# Patient Record
Sex: MALE | Race: WHITE | NOT HISPANIC OR LATINO | Employment: OTHER | URBAN - METROPOLITAN AREA
[De-identification: names, ages, dates, MRNs, and addresses within clinical notes are randomized per-mention and may not be internally consistent; named-entity substitution may affect disease eponyms.]

---

## 2024-05-17 ENCOUNTER — NURSING HOME VISIT (OUTPATIENT)
Dept: POST ACUTE CARE | Facility: EXTERNAL LOCATION | Age: 79
End: 2024-05-17
Payer: MEDICARE

## 2024-05-17 DIAGNOSIS — N40.0 BENIGN PROSTATIC HYPERPLASIA, UNSPECIFIED WHETHER LOWER URINARY TRACT SYMPTOMS PRESENT: ICD-10-CM

## 2024-05-17 DIAGNOSIS — I48.91 ATRIAL FIBRILLATION, UNSPECIFIED TYPE (MULTI): Primary | ICD-10-CM

## 2024-05-17 DIAGNOSIS — R53.1 WEAKNESS: ICD-10-CM

## 2024-05-17 PROCEDURE — 99308 SBSQ NF CARE LOW MDM 20: CPT | Performed by: REGISTERED NURSE

## 2024-05-17 NOTE — LETTER
Patient: Cheko Jin  : 1945    Encounter Date: 2024    PROGRESS NOTE    Subjective  Chief complaint: Cheko Jin is a 78 y.o. male who is an acute skilled patient being seen and evaluated for weakness    HPI:  24 Patient admitted to SNF for therapy d/t weakness after recent hospitalization.   Patient requires assist with ADLs and transfers.  No new complaints.      Objective  Vital signs: 112/72, 97.3, 66, 18, 96%    Physical Exam  Constitutional:       General: He is not in acute distress.  Eyes:      Extraocular Movements: Extraocular movements intact.   Pulmonary:      Effort: Pulmonary effort is normal.   Musculoskeletal:      Cervical back: Neck supple.   Neurological:      Mental Status: He is alert.   Psychiatric:         Mood and Affect: Mood normal.         Behavior: Behavior is cooperative.         Assessment/Plan  Problem List Items Addressed This Visit       Atrial fibrillation (Multi) - Primary     Daily inr  Lovenox bridge to coumadin  Monitor for bruising and bleeding          Weakness     Therapy          BPH (benign prostatic hyperplasia)     Finasteride  Tamsulosin          Medications, treatments, and labs reviewed  Continue medications and treatments as listed in [facility]    Scribe Attestation  IKatie Scribe   attest that this documentation has been prepared under the direction and in the presence of KEVIN Mendoza.    Provider Attestation - Scribe documentation  All medical record entries made by the Scribe were at my direction and personally dictated by me. I have reviewed the chart and agree that the record accurately reflects my personal performance of the history, physical exam, discussion and plan.    KEVIN Mendoza        Electronically Signed By: KEVIN Mendoza   24  3:00 PM

## 2024-05-18 ENCOUNTER — LAB REQUISITION (OUTPATIENT)
Dept: LAB | Facility: HOSPITAL | Age: 79
End: 2024-05-18
Payer: MEDICARE

## 2024-05-18 DIAGNOSIS — Z79.01 LONG TERM (CURRENT) USE OF ANTICOAGULANTS: ICD-10-CM

## 2024-05-18 LAB
INR PPP: 1.3 (ref 0.9–1.1)
PROTHROMBIN TIME: 14.7 SECONDS (ref 9.8–12.8)

## 2024-05-18 PROCEDURE — 85610 PROTHROMBIN TIME: CPT

## 2024-05-19 ENCOUNTER — LAB REQUISITION (OUTPATIENT)
Dept: LAB | Facility: HOSPITAL | Age: 79
End: 2024-05-19
Payer: MEDICARE

## 2024-05-19 DIAGNOSIS — Z79.01 LONG TERM (CURRENT) USE OF ANTICOAGULANTS: ICD-10-CM

## 2024-05-19 LAB
INR PPP: 1.2 (ref 0.9–1.1)
PROTHROMBIN TIME: 13.9 SECONDS (ref 9.8–12.8)

## 2024-05-19 PROCEDURE — 85610 PROTHROMBIN TIME: CPT

## 2024-05-20 ENCOUNTER — NURSING HOME VISIT (OUTPATIENT)
Dept: POST ACUTE CARE | Facility: EXTERNAL LOCATION | Age: 79
End: 2024-05-20
Payer: MEDICARE

## 2024-05-20 DIAGNOSIS — N40.0 BENIGN PROSTATIC HYPERPLASIA, UNSPECIFIED WHETHER LOWER URINARY TRACT SYMPTOMS PRESENT: ICD-10-CM

## 2024-05-20 DIAGNOSIS — I48.91 ATRIAL FIBRILLATION, UNSPECIFIED TYPE (MULTI): Primary | ICD-10-CM

## 2024-05-20 DIAGNOSIS — I11.0 HYPERTENSIVE HEART DISEASE WITH HEART FAILURE (MULTI): ICD-10-CM

## 2024-05-20 DIAGNOSIS — E78.5 HYPERLIPIDEMIA, UNSPECIFIED HYPERLIPIDEMIA TYPE: ICD-10-CM

## 2024-05-20 DIAGNOSIS — M10.9 GOUT, UNSPECIFIED CAUSE, UNSPECIFIED CHRONICITY, UNSPECIFIED SITE: ICD-10-CM

## 2024-05-20 PROBLEM — R53.1 WEAKNESS: Status: ACTIVE | Noted: 2024-05-20

## 2024-05-20 PROCEDURE — 99305 1ST NF CARE MODERATE MDM 35: CPT | Performed by: INTERNAL MEDICINE

## 2024-05-20 NOTE — LETTER
Patient: Cheko Jin  : 1945    Encounter Date: 2024    HISTORY & PHYSICAL    Subjective  Chief complaint: Cheko Jin is a 78 y.o. male who is being seen and evaluated for multiple medical problems.  Patient admitted to SNF for therapy due to weakness after recent hospitalization.    HPI:  HPI  Patient presents for admission to nursing facility following hospitalization for A-fib RVR.  Patient has a past history significant for CHF, hypertension, malignant neoplasm of bladder, CKD, gout, hyperlipidemia, history of TIA.  Patient was hemodynamically stable to discharge to SNF for continued medical management and therapy.  Patient seen and examined at the bedside, appears to be in no acute distress.    Past Medical History:   Diagnosis Date   • STUART (acute kidney injury) (CMS-HCC)    • Atherosclerotic heart disease of native coronary artery without angina pectoris    • Atrial fibrillation (Multi)    • CHF (congestive heart failure) (Multi)    • CKD (chronic kidney disease)    • Gout    • Hypertension    • Malignant neoplasm of bladder, unspecified (Multi)    • Personal history of other diseases of the digestive system     History of hiatal hernia   • Personal history of other diseases of the musculoskeletal system and connective tissue     History of arthritis   • Personal history of other diseases of the nervous system and sense organs     History of sleep apnea       Past Surgical History:   Procedure Laterality Date   • OTHER SURGICAL HISTORY  2019    Hernia repair       Family History   Problem Relation Name Age of Onset   • No Known Problems Mother     • No Known Problems Father         Social History     Socioeconomic History   • Marital status: Single     Spouse name: Not on file   • Number of children: Not on file   • Years of education: Not on file   • Highest education level: Not on file   Occupational History   • Not on file   Tobacco Use   • Smoking status: Not on file   •  Smokeless tobacco: Not on file   Substance and Sexual Activity   • Alcohol use: Not on file   • Drug use: Not on file   • Sexual activity: Not on file   Other Topics Concern   • Not on file   Social History Narrative   • Not on file     Social Determinants of Health     Financial Resource Strain: Not on file   Food Insecurity: Not on file   Transportation Needs: Not on file   Physical Activity: Not on file   Stress: Not on file   Social Connections: Not on file   Intimate Partner Violence: Not on file   Housing Stability: Not on file       Vital signs: 120/69, 80, 18, 97.9, 95%    Objective  Physical Exam  Constitutional:       General: He is not in acute distress.  Eyes:      Extraocular Movements: Extraocular movements intact.   Cardiovascular:      Rate and Rhythm: Normal rate and regular rhythm.   Pulmonary:      Effort: Pulmonary effort is normal.      Breath sounds: Normal breath sounds.   Abdominal:      General: Bowel sounds are normal.      Palpations: Abdomen is soft.   Musculoskeletal:      Cervical back: Neck supple.      Right lower leg: No edema.      Left lower leg: No edema.   Neurological:      Mental Status: He is alert.   Psychiatric:         Mood and Affect: Mood normal.         Behavior: Behavior is cooperative.         Assessment/Plan  Problem List Items Addressed This Visit       Hypertensive heart disease with heart failure (Multi)     Monitor blood pressure  Furosemide  Hydralazine  Metoprolol  Isosorbide dinitrate  CHF: Monitor for shortness of breath         Atrial fibrillation (Multi) - Primary     Warfarin  Monitor INR  Bleeding precautions  Amiodarone         BPH (benign prostatic hyperplasia)     Finasteride  Tamsulosin         Gout     Allopurinol  Monitor for gout flare         Hyperlipidemia     Statin  Monitor labs          Hospital records reviewed  Medications, treatments, and labs reviewed  Continue medications and treatments as listed in EMR  Discussed with nursing and  therapy      Scribe Attestation  I, Melyssa Travis   attest that this documentation has been prepared under the direction and in the presence of Jack Bergman MD    Provider Attestation - Scribe documentation  All medical record entries made by the Scribe were at my direction and personally dictated by me. I have reviewed the chart and agree that the record accurately reflects my personal performance of the history, physical exam, discussion and plan.   Jack Bergman MD      Electronically Signed By: Jack Bergman MD   5/20/24  5:12 PM

## 2024-05-20 NOTE — ASSESSMENT & PLAN NOTE
Monitor blood pressure  Furosemide  Hydralazine  Metoprolol  Isosorbide dinitrate  CHF: Monitor for shortness of breath

## 2024-05-20 NOTE — PROGRESS NOTES
PROGRESS NOTE    Subjective   Chief complaint: Cheko Jin is a 78 y.o. male who is an acute skilled patient being seen and evaluated for weakness    HPI:  5/17/24 Patient admitted to SNF for therapy d/t weakness after recent hospitalization.   Patient requires assist with ADLs and transfers.  No new complaints.      Objective   Vital signs: 112/72, 97.3, 66, 18, 96%    Physical Exam  Constitutional:       General: He is not in acute distress.  Eyes:      Extraocular Movements: Extraocular movements intact.   Pulmonary:      Effort: Pulmonary effort is normal.   Musculoskeletal:      Cervical back: Neck supple.   Neurological:      Mental Status: He is alert.   Psychiatric:         Mood and Affect: Mood normal.         Behavior: Behavior is cooperative.         Assessment/Plan   Problem List Items Addressed This Visit       Atrial fibrillation (Multi) - Primary     Daily inr  Lovenox bridge to coumadin  Monitor for bruising and bleeding          Weakness     Therapy          BPH (benign prostatic hyperplasia)     Finasteride  Tamsulosin          Medications, treatments, and labs reviewed  Continue medications and treatments as listed in [facility]    Scribe Attestation  IKatie Scribe   attest that this documentation has been prepared under the direction and in the presence of KEVIN Mendoza.    Provider Attestation - Scribe documentation  All medical record entries made by the Scribe were at my direction and personally dictated by me. I have reviewed the chart and agree that the record accurately reflects my personal performance of the history, physical exam, discussion and plan.    KEVIN Mendoza

## 2024-05-20 NOTE — PROGRESS NOTES
HISTORY & PHYSICAL    Subjective   Chief complaint: Cheko Jin is a 78 y.o. male who is being seen and evaluated for multiple medical problems.  Patient admitted to SNF for therapy due to weakness after recent hospitalization.    HPI:  HPI  Patient presents for admission to nursing facility following hospitalization for A-fib RVR.  Patient has a past history significant for CHF, hypertension, malignant neoplasm of bladder, CKD, gout, hyperlipidemia, history of TIA.  Patient was hemodynamically stable to discharge to SNF for continued medical management and therapy.  Patient seen and examined at the bedside, appears to be in no acute distress.    Past Medical History:   Diagnosis Date    STUART (acute kidney injury) (CMS-HCC)     Atherosclerotic heart disease of native coronary artery without angina pectoris     Atrial fibrillation (Multi)     CHF (congestive heart failure) (Multi)     CKD (chronic kidney disease)     Gout     Hypertension     Malignant neoplasm of bladder, unspecified (Multi)     Personal history of other diseases of the digestive system     History of hiatal hernia    Personal history of other diseases of the musculoskeletal system and connective tissue     History of arthritis    Personal history of other diseases of the nervous system and sense organs     History of sleep apnea       Past Surgical History:   Procedure Laterality Date    OTHER SURGICAL HISTORY  07/19/2019    Hernia repair       Family History   Problem Relation Name Age of Onset    No Known Problems Mother      No Known Problems Father         Social History     Socioeconomic History    Marital status: Single     Spouse name: Not on file    Number of children: Not on file    Years of education: Not on file    Highest education level: Not on file   Occupational History    Not on file   Tobacco Use    Smoking status: Not on file    Smokeless tobacco: Not on file   Substance and Sexual Activity    Alcohol use: Not on file    Drug  use: Not on file    Sexual activity: Not on file   Other Topics Concern    Not on file   Social History Narrative    Not on file     Social Determinants of Health     Financial Resource Strain: Not on file   Food Insecurity: Not on file   Transportation Needs: Not on file   Physical Activity: Not on file   Stress: Not on file   Social Connections: Not on file   Intimate Partner Violence: Not on file   Housing Stability: Not on file       Vital signs: 120/69, 80, 18, 97.9, 95%    Objective   Physical Exam  Constitutional:       General: He is not in acute distress.  Eyes:      Extraocular Movements: Extraocular movements intact.   Cardiovascular:      Rate and Rhythm: Normal rate and regular rhythm.   Pulmonary:      Effort: Pulmonary effort is normal.      Breath sounds: Normal breath sounds.   Abdominal:      General: Bowel sounds are normal.      Palpations: Abdomen is soft.   Musculoskeletal:      Cervical back: Neck supple.      Right lower leg: No edema.      Left lower leg: No edema.   Neurological:      Mental Status: He is alert.   Psychiatric:         Mood and Affect: Mood normal.         Behavior: Behavior is cooperative.         Assessment/Plan   Problem List Items Addressed This Visit       Hypertensive heart disease with heart failure (Multi)     Monitor blood pressure  Furosemide  Hydralazine  Metoprolol  Isosorbide dinitrate  CHF: Monitor for shortness of breath         Atrial fibrillation (Multi) - Primary     Warfarin  Monitor INR  Bleeding precautions  Amiodarone         BPH (benign prostatic hyperplasia)     Finasteride  Tamsulosin         Gout     Allopurinol  Monitor for gout flare         Hyperlipidemia     Statin  Monitor labs          Hospital records reviewed  Medications, treatments, and labs reviewed  Continue medications and treatments as listed in EMR  Discussed with nursing and therapy      Scribe Attestation  ALLIE, Henna Pro, Melyssa   attest that this documentation has been prepared  under the direction and in the presence of Jack Bergman MD    Provider Attestation - Scribe documentation  All medical record entries made by the Scribe were at my direction and personally dictated by me. I have reviewed the chart and agree that the record accurately reflects my personal performance of the history, physical exam, discussion and plan.   Jack Bergman MD

## 2024-06-01 ENCOUNTER — HOSPITAL ENCOUNTER (INPATIENT)
Facility: HOSPITAL | Age: 79
Discharge: HOME HEALTH | DRG: 641 | End: 2024-06-01
Attending: STUDENT IN AN ORGANIZED HEALTH CARE EDUCATION/TRAINING PROGRAM | Admitting: INTERNAL MEDICINE
Payer: MEDICARE

## 2024-06-01 ENCOUNTER — APPOINTMENT (OUTPATIENT)
Dept: RADIOLOGY | Facility: HOSPITAL | Age: 79
DRG: 641 | End: 2024-06-01
Payer: MEDICARE

## 2024-06-01 ENCOUNTER — APPOINTMENT (OUTPATIENT)
Dept: CARDIOLOGY | Facility: HOSPITAL | Age: 79
DRG: 641 | End: 2024-06-01
Payer: MEDICARE

## 2024-06-01 DIAGNOSIS — N39.0 URINARY TRACT INFECTION WITHOUT HEMATURIA, SITE UNSPECIFIED: ICD-10-CM

## 2024-06-01 DIAGNOSIS — S00.01XA ABRASION OF SCALP, INITIAL ENCOUNTER: ICD-10-CM

## 2024-06-01 DIAGNOSIS — I48.91 ATRIAL FIBRILLATION, UNSPECIFIED TYPE (MULTI): ICD-10-CM

## 2024-06-01 DIAGNOSIS — E78.5 HYPERLIPIDEMIA, UNSPECIFIED HYPERLIPIDEMIA TYPE: ICD-10-CM

## 2024-06-01 DIAGNOSIS — E87.1 HYPONATREMIA: Primary | ICD-10-CM

## 2024-06-01 DIAGNOSIS — R53.1 WEAKNESS: ICD-10-CM

## 2024-06-01 DIAGNOSIS — R79.1 ELEVATED INR: ICD-10-CM

## 2024-06-01 LAB
ABO GROUP (TYPE) IN BLOOD: NORMAL
ALBUMIN SERPL BCP-MCNC: 3.6 G/DL (ref 3.4–5)
ALP SERPL-CCNC: 46 U/L (ref 33–136)
ALT SERPL W P-5'-P-CCNC: 18 U/L (ref 10–52)
ANION GAP SERPL CALC-SCNC: 11 MMOL/L (ref 10–20)
ANTIBODY SCREEN: NORMAL
APPEARANCE UR: CLEAR
AST SERPL W P-5'-P-CCNC: 31 U/L (ref 9–39)
BACTERIA #/AREA URNS AUTO: ABNORMAL /HPF
BASOPHILS # BLD AUTO: 0.01 X10*3/UL (ref 0–0.1)
BASOPHILS NFR BLD AUTO: 0.1 %
BILIRUB SERPL-MCNC: 1.1 MG/DL (ref 0–1.2)
BILIRUB UR STRIP.AUTO-MCNC: NEGATIVE MG/DL
BUN SERPL-MCNC: 64 MG/DL (ref 6–23)
CALCIUM SERPL-MCNC: 8.3 MG/DL (ref 8.6–10.3)
CARDIAC TROPONIN I PNL SERPL HS: 14 NG/L (ref 0–20)
CARDIAC TROPONIN I PNL SERPL HS: 16 NG/L (ref 0–20)
CHLORIDE SERPL-SCNC: 99 MMOL/L (ref 98–107)
CO2 SERPL-SCNC: 25 MMOL/L (ref 21–32)
COLOR UR: COLORLESS
CREAT SERPL-MCNC: 2.7 MG/DL (ref 0.5–1.3)
EGFRCR SERPLBLD CKD-EPI 2021: 23 ML/MIN/1.73M*2
EOSINOPHIL # BLD AUTO: 0 X10*3/UL (ref 0–0.4)
EOSINOPHIL NFR BLD AUTO: 0 %
ERYTHROCYTE [DISTWIDTH] IN BLOOD BY AUTOMATED COUNT: 13.8 % (ref 11.5–14.5)
ETHANOL SERPL-MCNC: <10 MG/DL
GLUCOSE SERPL-MCNC: 115 MG/DL (ref 74–99)
GLUCOSE UR STRIP.AUTO-MCNC: NORMAL MG/DL
HCT VFR BLD AUTO: 39.1 % (ref 41–52)
HGB BLD-MCNC: 13.2 G/DL (ref 13.5–17.5)
HYALINE CASTS #/AREA URNS AUTO: ABNORMAL /LPF
IMM GRANULOCYTES # BLD AUTO: 0.04 X10*3/UL (ref 0–0.5)
IMM GRANULOCYTES NFR BLD AUTO: 0.5 % (ref 0–0.9)
INR PPP: 5.9 (ref 0.9–1.1)
KETONES UR STRIP.AUTO-MCNC: NEGATIVE MG/DL
LACTATE SERPL-SCNC: 1.4 MMOL/L (ref 0.4–2)
LEUKOCYTE ESTERASE UR QL STRIP.AUTO: ABNORMAL
LYMPHOCYTES # BLD AUTO: 0.35 X10*3/UL (ref 0.8–3)
LYMPHOCYTES NFR BLD AUTO: 4.1 %
MAGNESIUM SERPL-MCNC: 1.99 MG/DL (ref 1.6–2.4)
MCH RBC QN AUTO: 31.6 PG (ref 26–34)
MCHC RBC AUTO-ENTMCNC: 33.8 G/DL (ref 32–36)
MCV RBC AUTO: 94 FL (ref 80–100)
MONOCYTES # BLD AUTO: 0.68 X10*3/UL (ref 0.05–0.8)
MONOCYTES NFR BLD AUTO: 8 %
MUCOUS THREADS #/AREA URNS AUTO: ABNORMAL /LPF
NEUTROPHILS # BLD AUTO: 7.42 X10*3/UL (ref 1.6–5.5)
NEUTROPHILS NFR BLD AUTO: 87.3 %
NITRITE UR QL STRIP.AUTO: NEGATIVE
NRBC BLD-RTO: 0 /100 WBCS (ref 0–0)
PH UR STRIP.AUTO: 5.5 [PH]
PLATELET # BLD AUTO: 150 X10*3/UL (ref 150–450)
POTASSIUM SERPL-SCNC: 3.9 MMOL/L (ref 3.5–5.3)
PROT SERPL-MCNC: 5.8 G/DL (ref 6.4–8.2)
PROT UR STRIP.AUTO-MCNC: NEGATIVE MG/DL
PROTHROMBIN TIME: 68.2 SECONDS (ref 9.8–12.8)
RBC # BLD AUTO: 4.18 X10*6/UL (ref 4.5–5.9)
RBC # UR STRIP.AUTO: NEGATIVE /UL
RBC #/AREA URNS AUTO: ABNORMAL /HPF
RH FACTOR (ANTIGEN D): NORMAL
SODIUM SERPL-SCNC: 131 MMOL/L (ref 136–145)
SP GR UR STRIP.AUTO: 1.02
UROBILINOGEN UR STRIP.AUTO-MCNC: NORMAL MG/DL
WBC # BLD AUTO: 8.5 X10*3/UL (ref 4.4–11.3)
WBC #/AREA URNS AUTO: ABNORMAL /HPF

## 2024-06-01 PROCEDURE — 81001 URINALYSIS AUTO W/SCOPE: CPT | Performed by: STUDENT IN AN ORGANIZED HEALTH CARE EDUCATION/TRAINING PROGRAM

## 2024-06-01 PROCEDURE — 71045 X-RAY EXAM CHEST 1 VIEW: CPT | Mod: FOREIGN READ | Performed by: RADIOLOGY

## 2024-06-01 PROCEDURE — 36415 COLL VENOUS BLD VENIPUNCTURE: CPT | Performed by: STUDENT IN AN ORGANIZED HEALTH CARE EDUCATION/TRAINING PROGRAM

## 2024-06-01 PROCEDURE — 2550000001 HC RX 255 CONTRASTS: Performed by: STUDENT IN AN ORGANIZED HEALTH CARE EDUCATION/TRAINING PROGRAM

## 2024-06-01 PROCEDURE — 82077 ASSAY SPEC XCP UR&BREATH IA: CPT | Performed by: STUDENT IN AN ORGANIZED HEALTH CARE EDUCATION/TRAINING PROGRAM

## 2024-06-01 PROCEDURE — 86901 BLOOD TYPING SEROLOGIC RH(D): CPT | Performed by: STUDENT IN AN ORGANIZED HEALTH CARE EDUCATION/TRAINING PROGRAM

## 2024-06-01 PROCEDURE — 74177 CT ABD & PELVIS W/CONTRAST: CPT

## 2024-06-01 PROCEDURE — 71045 X-RAY EXAM CHEST 1 VIEW: CPT

## 2024-06-01 PROCEDURE — 93005 ELECTROCARDIOGRAM TRACING: CPT

## 2024-06-01 PROCEDURE — 72128 CT CHEST SPINE W/O DYE: CPT | Performed by: RADIOLOGY

## 2024-06-01 PROCEDURE — 74177 CT ABD & PELVIS W/CONTRAST: CPT | Mod: FOREIGN READ | Performed by: RADIOLOGY

## 2024-06-01 PROCEDURE — G0390 TRAUMA RESPONS W/HOSP CRITI: HCPCS

## 2024-06-01 PROCEDURE — 72170 X-RAY EXAM OF PELVIS: CPT | Mod: FOREIGN READ | Performed by: RADIOLOGY

## 2024-06-01 PROCEDURE — 72131 CT LUMBAR SPINE W/O DYE: CPT | Performed by: RADIOLOGY

## 2024-06-01 PROCEDURE — 72131 CT LUMBAR SPINE W/O DYE: CPT

## 2024-06-01 PROCEDURE — 2500000002 HC RX 250 W HCPCS SELF ADMINISTERED DRUGS (ALT 637 FOR MEDICARE OP, ALT 636 FOR OP/ED): Performed by: INTERNAL MEDICINE

## 2024-06-01 PROCEDURE — 96365 THER/PROPH/DIAG IV INF INIT: CPT

## 2024-06-01 PROCEDURE — 72125 CT NECK SPINE W/O DYE: CPT | Performed by: RADIOLOGY

## 2024-06-01 PROCEDURE — 70450 CT HEAD/BRAIN W/O DYE: CPT

## 2024-06-01 PROCEDURE — 85610 PROTHROMBIN TIME: CPT | Performed by: STUDENT IN AN ORGANIZED HEALTH CARE EDUCATION/TRAINING PROGRAM

## 2024-06-01 PROCEDURE — 80053 COMPREHEN METABOLIC PANEL: CPT | Performed by: STUDENT IN AN ORGANIZED HEALTH CARE EDUCATION/TRAINING PROGRAM

## 2024-06-01 PROCEDURE — 83605 ASSAY OF LACTIC ACID: CPT | Performed by: STUDENT IN AN ORGANIZED HEALTH CARE EDUCATION/TRAINING PROGRAM

## 2024-06-01 PROCEDURE — 99291 CRITICAL CARE FIRST HOUR: CPT | Mod: 25 | Performed by: STUDENT IN AN ORGANIZED HEALTH CARE EDUCATION/TRAINING PROGRAM

## 2024-06-01 PROCEDURE — 72125 CT NECK SPINE W/O DYE: CPT

## 2024-06-01 PROCEDURE — 72128 CT CHEST SPINE W/O DYE: CPT

## 2024-06-01 PROCEDURE — 71260 CT THORAX DX C+: CPT | Mod: FOREIGN READ | Performed by: RADIOLOGY

## 2024-06-01 PROCEDURE — 83735 ASSAY OF MAGNESIUM: CPT | Performed by: STUDENT IN AN ORGANIZED HEALTH CARE EDUCATION/TRAINING PROGRAM

## 2024-06-01 PROCEDURE — 1200000002 HC GENERAL ROOM WITH TELEMETRY DAILY

## 2024-06-01 PROCEDURE — 87086 URINE CULTURE/COLONY COUNT: CPT | Mod: PARLAB | Performed by: STUDENT IN AN ORGANIZED HEALTH CARE EDUCATION/TRAINING PROGRAM

## 2024-06-01 PROCEDURE — 85025 COMPLETE CBC W/AUTO DIFF WBC: CPT | Performed by: STUDENT IN AN ORGANIZED HEALTH CARE EDUCATION/TRAINING PROGRAM

## 2024-06-01 PROCEDURE — 2500000004 HC RX 250 GENERAL PHARMACY W/ HCPCS (ALT 636 FOR OP/ED): Performed by: STUDENT IN AN ORGANIZED HEALTH CARE EDUCATION/TRAINING PROGRAM

## 2024-06-01 PROCEDURE — 84484 ASSAY OF TROPONIN QUANT: CPT | Performed by: STUDENT IN AN ORGANIZED HEALTH CARE EDUCATION/TRAINING PROGRAM

## 2024-06-01 PROCEDURE — 70450 CT HEAD/BRAIN W/O DYE: CPT | Performed by: RADIOLOGY

## 2024-06-01 PROCEDURE — 99223 1ST HOSP IP/OBS HIGH 75: CPT | Performed by: INTERNAL MEDICINE

## 2024-06-01 PROCEDURE — 72170 X-RAY EXAM OF PELVIS: CPT

## 2024-06-01 RX ORDER — METOPROLOL SUCCINATE 50 MG/1
50 TABLET, EXTENDED RELEASE ORAL DAILY
COMMUNITY

## 2024-06-01 RX ORDER — HYDRALAZINE HYDROCHLORIDE 25 MG/1
25 TABLET, FILM COATED ORAL 2 TIMES DAILY
Status: DISCONTINUED | OUTPATIENT
Start: 2024-06-01 | End: 2024-06-06

## 2024-06-01 RX ORDER — FINASTERIDE 5 MG/1
5 TABLET, FILM COATED ORAL DAILY
Status: DISCONTINUED | OUTPATIENT
Start: 2024-06-02 | End: 2024-06-12 | Stop reason: HOSPADM

## 2024-06-01 RX ORDER — FUROSEMIDE 40 MG/1
40 TABLET ORAL DAILY
COMMUNITY

## 2024-06-01 RX ORDER — ACETAMINOPHEN 325 MG/1
650 TABLET ORAL EVERY 4 HOURS PRN
Status: DISCONTINUED | OUTPATIENT
Start: 2024-06-01 | End: 2024-06-12 | Stop reason: HOSPADM

## 2024-06-01 RX ORDER — HYDRALAZINE HYDROCHLORIDE 25 MG/1
25 TABLET, FILM COATED ORAL 2 TIMES DAILY
COMMUNITY

## 2024-06-01 RX ORDER — WARFARIN SODIUM 5 MG/1
5 TABLET ORAL DAILY
COMMUNITY

## 2024-06-01 RX ORDER — ACETAMINOPHEN 160 MG/5ML
650 SOLUTION ORAL EVERY 4 HOURS PRN
Status: DISCONTINUED | OUTPATIENT
Start: 2024-06-01 | End: 2024-06-02

## 2024-06-01 RX ORDER — ACETAMINOPHEN 650 MG/1
650 SUPPOSITORY RECTAL EVERY 4 HOURS PRN
Status: DISCONTINUED | OUTPATIENT
Start: 2024-06-01 | End: 2024-06-02

## 2024-06-01 RX ORDER — DAPAGLIFLOZIN 5 MG/1
5 TABLET, FILM COATED ORAL DAILY
COMMUNITY

## 2024-06-01 RX ORDER — AMIODARONE HYDROCHLORIDE 200 MG/1
200 TABLET ORAL DAILY
COMMUNITY

## 2024-06-01 RX ORDER — ATORVASTATIN CALCIUM 10 MG/1
10 TABLET, FILM COATED ORAL DAILY
COMMUNITY

## 2024-06-01 RX ORDER — ALLOPURINOL 300 MG/1
150 TABLET ORAL DAILY
COMMUNITY

## 2024-06-01 RX ORDER — ATORVASTATIN CALCIUM 10 MG/1
10 TABLET, FILM COATED ORAL DAILY
Status: DISCONTINUED | OUTPATIENT
Start: 2024-06-02 | End: 2024-06-04

## 2024-06-01 RX ORDER — TAMSULOSIN HYDROCHLORIDE 0.4 MG/1
0.4 CAPSULE ORAL DAILY
Status: DISCONTINUED | OUTPATIENT
Start: 2024-06-02 | End: 2024-06-12 | Stop reason: HOSPADM

## 2024-06-01 RX ORDER — PHYTONADIONE 5 MG/1
10 TABLET ORAL ONCE
Status: COMPLETED | OUTPATIENT
Start: 2024-06-01 | End: 2024-06-01

## 2024-06-01 RX ORDER — TAMSULOSIN HYDROCHLORIDE 0.4 MG/1
0.4 CAPSULE ORAL DAILY
COMMUNITY

## 2024-06-01 RX ORDER — ALLOPURINOL 300 MG/1
150 TABLET ORAL DAILY
Status: DISCONTINUED | OUTPATIENT
Start: 2024-06-02 | End: 2024-06-12 | Stop reason: HOSPADM

## 2024-06-01 RX ORDER — CEFTRIAXONE 1 G/50ML
1 INJECTION, SOLUTION INTRAVENOUS ONCE
Status: COMPLETED | OUTPATIENT
Start: 2024-06-01 | End: 2024-06-01

## 2024-06-01 RX ORDER — TALC
3 POWDER (GRAM) TOPICAL NIGHTLY PRN
Status: DISCONTINUED | OUTPATIENT
Start: 2024-06-01 | End: 2024-06-05

## 2024-06-01 RX ORDER — ISOSORBIDE DINITRATE 20 MG/1
20 TABLET ORAL 2 TIMES DAILY
COMMUNITY

## 2024-06-01 RX ORDER — MULTIVIT-MIN/IRON FUM/FOLIC AC 7.5 MG-4
1 TABLET ORAL DAILY
COMMUNITY

## 2024-06-01 RX ORDER — ISOSORBIDE DINITRATE 10 MG/1
20 TABLET ORAL DAILY
Status: DISCONTINUED | OUTPATIENT
Start: 2024-06-02 | End: 2024-06-12 | Stop reason: HOSPADM

## 2024-06-01 RX ORDER — AMIODARONE HYDROCHLORIDE 200 MG/1
200 TABLET ORAL DAILY
Status: DISCONTINUED | OUTPATIENT
Start: 2024-06-02 | End: 2024-06-12 | Stop reason: HOSPADM

## 2024-06-01 RX ORDER — METOPROLOL SUCCINATE 50 MG/1
50 TABLET, EXTENDED RELEASE ORAL DAILY
Status: DISCONTINUED | OUTPATIENT
Start: 2024-06-02 | End: 2024-06-12 | Stop reason: HOSPADM

## 2024-06-01 RX ORDER — DAPAGLIFLOZIN 5 MG/1
5 TABLET, FILM COATED ORAL DAILY
Status: DISCONTINUED | OUTPATIENT
Start: 2024-06-02 | End: 2024-06-12 | Stop reason: HOSPADM

## 2024-06-01 RX ORDER — CHOLECALCIFEROL (VITAMIN D3) 50 MCG
50 TABLET ORAL DAILY
COMMUNITY

## 2024-06-01 RX ORDER — CHOLECALCIFEROL (VITAMIN D3) 25 MCG
2000 TABLET ORAL DAILY
Status: DISCONTINUED | OUTPATIENT
Start: 2024-06-02 | End: 2024-06-12 | Stop reason: HOSPADM

## 2024-06-01 RX ORDER — FUROSEMIDE 40 MG/1
40 TABLET ORAL DAILY
Status: DISCONTINUED | OUTPATIENT
Start: 2024-06-02 | End: 2024-06-10

## 2024-06-01 RX ORDER — FINASTERIDE 5 MG/1
5 TABLET, FILM COATED ORAL DAILY
COMMUNITY

## 2024-06-01 RX ADMIN — PHYTONADIONE 10 MG: 5 TABLET ORAL at 21:29

## 2024-06-01 RX ADMIN — IOHEXOL 90 ML: 350 INJECTION, SOLUTION INTRAVENOUS at 16:50

## 2024-06-01 RX ADMIN — CEFTRIAXONE SODIUM 1 G: 1 INJECTION, SOLUTION INTRAVENOUS at 19:33

## 2024-06-01 SDOH — ECONOMIC STABILITY: FOOD INSECURITY
WITHIN THE PAST 12 MONTHS, YOU WORRIED THAT YOUR FOOD WOULD RUN OUT BEFORE YOU GOT MONEY TO BUY MORE.: PATIENT UNABLE TO ANSWER

## 2024-06-01 SDOH — SOCIAL STABILITY: SOCIAL INSECURITY
WITHIN THE LAST YEAR, HAVE YOU BEEN KICKED, HIT, SLAPPED, OR OTHERWISE PHYSICALLY HURT BY YOUR PARTNER OR EX-PARTNER?: PATIENT UNABLE TO ANSWER

## 2024-06-01 SDOH — SOCIAL STABILITY: SOCIAL INSECURITY
WITHIN THE LAST YEAR, HAVE TO BEEN RAPED OR FORCED TO HAVE ANY KIND OF SEXUAL ACTIVITY BY YOUR PARTNER OR EX-PARTNER?: PATIENT UNABLE TO ANSWER

## 2024-06-01 SDOH — HEALTH STABILITY: MENTAL HEALTH
HOW OFTEN DO YOU NEED TO HAVE SOMEONE HELP YOU WHEN YOU READ INSTRUCTIONS, PAMPHLETS, OR OTHER WRITTEN MATERIAL FROM YOUR DOCTOR OR PHARMACY?: PATIENT UNABLE TO RESPOND

## 2024-06-01 SDOH — SOCIAL STABILITY: SOCIAL NETWORK
DO YOU BELONG TO ANY CLUBS OR ORGANIZATIONS SUCH AS CHURCH GROUPS UNIONS, FRATERNAL OR ATHLETIC GROUPS, OR SCHOOL GROUPS?: PATIENT UNABLE TO ANSWER

## 2024-06-01 SDOH — SOCIAL STABILITY: SOCIAL NETWORK: HOW OFTEN DO YOU ATTENT MEETINGS OF THE CLUB OR ORGANIZATION YOU BELONG TO?: PATIENT UNABLE TO ANSWER

## 2024-06-01 SDOH — SOCIAL STABILITY: SOCIAL INSECURITY: DO YOU FEEL UNSAFE GOING BACK TO THE PLACE WHERE YOU ARE LIVING?: NO

## 2024-06-01 SDOH — SOCIAL STABILITY: SOCIAL NETWORK: ARE YOU MARRIED, WIDOWED, DIVORCED, SEPARATED, NEVER MARRIED, OR LIVING WITH A PARTNER?: PATIENT UNABLE TO ANSWER

## 2024-06-01 SDOH — ECONOMIC STABILITY: HOUSING INSECURITY: IN THE LAST 12 MONTHS, HOW MANY PLACES HAVE YOU LIVED?: 1

## 2024-06-01 SDOH — SOCIAL STABILITY: SOCIAL INSECURITY: HAVE YOU HAD ANY THOUGHTS OF HARMING ANYONE ELSE?: NO

## 2024-06-01 SDOH — HEALTH STABILITY: MENTAL HEALTH: HOW OFTEN DO YOU HAVE A DRINK CONTAINING ALCOHOL?: PATIENT UNABLE TO ANSWER

## 2024-06-01 SDOH — ECONOMIC STABILITY: TRANSPORTATION INSECURITY
IN THE PAST 12 MONTHS, HAS LACK OF TRANSPORTATION KEPT YOU FROM MEETINGS, WORK, OR FROM GETTING THINGS NEEDED FOR DAILY LIVING?: PATIENT UNABLE TO ANSWER

## 2024-06-01 SDOH — SOCIAL STABILITY: SOCIAL INSECURITY: ABUSE: ADULT

## 2024-06-01 SDOH — SOCIAL STABILITY: SOCIAL NETWORK: IN A TYPICAL WEEK, HOW MANY TIMES DO YOU TALK ON THE PHONE WITH FAMILY, FRIENDS, OR NEIGHBORS?: PATIENT UNABLE TO ANSWER

## 2024-06-01 SDOH — SOCIAL STABILITY: SOCIAL INSECURITY: WITHIN THE LAST YEAR, HAVE YOU BEEN AFRAID OF YOUR PARTNER OR EX-PARTNER?: PATIENT UNABLE TO ANSWER

## 2024-06-01 SDOH — HEALTH STABILITY: PHYSICAL HEALTH: ON AVERAGE, HOW MANY MINUTES DO YOU ENGAGE IN EXERCISE AT THIS LEVEL?: PATIENT UNABLE TO ANSWER

## 2024-06-01 SDOH — ECONOMIC STABILITY: FOOD INSECURITY
WITHIN THE PAST 12 MONTHS, THE FOOD YOU BOUGHT JUST DIDN'T LAST AND YOU DIDN'T HAVE MONEY TO GET MORE.: PATIENT UNABLE TO ANSWER

## 2024-06-01 SDOH — SOCIAL STABILITY: SOCIAL INSECURITY: ARE YOU OR HAVE YOU BEEN THREATENED OR ABUSED PHYSICALLY, EMOTIONALLY, OR SEXUALLY BY ANYONE?: NO

## 2024-06-01 SDOH — ECONOMIC STABILITY: INCOME INSECURITY
IN THE LAST 12 MONTHS, WAS THERE A TIME WHEN YOU WERE NOT ABLE TO PAY THE MORTGAGE OR RENT ON TIME?: PATIENT UNABLE TO ANSWER

## 2024-06-01 SDOH — SOCIAL STABILITY: SOCIAL NETWORK: HOW OFTEN DO YOU ATTEND CHURCH OR RELIGIOUS SERVICES?: PATIENT UNABLE TO ANSWER

## 2024-06-01 SDOH — SOCIAL STABILITY: SOCIAL INSECURITY: WERE YOU ABLE TO COMPLETE ALL THE BEHAVIORAL HEALTH SCREENINGS?: YES

## 2024-06-01 SDOH — SOCIAL STABILITY: SOCIAL NETWORK: HOW OFTEN DO YOU GET TOGETHER WITH FRIENDS OR RELATIVES?: PATIENT UNABLE TO ANSWER

## 2024-06-01 SDOH — ECONOMIC STABILITY: INCOME INSECURITY
HOW HARD IS IT FOR YOU TO PAY FOR THE VERY BASICS LIKE FOOD, HOUSING, MEDICAL CARE, AND HEATING?: PATIENT UNABLE TO ANSWER

## 2024-06-01 SDOH — HEALTH STABILITY: PHYSICAL HEALTH
ON AVERAGE, HOW MANY DAYS PER WEEK DO YOU ENGAGE IN MODERATE TO STRENUOUS EXERCISE (LIKE A BRISK WALK)?: PATIENT UNABLE TO ANSWER

## 2024-06-01 SDOH — HEALTH STABILITY: MENTAL HEALTH
STRESS IS WHEN SOMEONE FEELS TENSE, NERVOUS, ANXIOUS, OR CAN'T SLEEP AT NIGHT BECAUSE THEIR MIND IS TROUBLED. HOW STRESSED ARE YOU?: PATIENT UNABLE TO ANSWER

## 2024-06-01 SDOH — ECONOMIC STABILITY: TRANSPORTATION INSECURITY
IN THE PAST 12 MONTHS, HAS THE LACK OF TRANSPORTATION KEPT YOU FROM MEDICAL APPOINTMENTS OR FROM GETTING MEDICATIONS?: PATIENT UNABLE TO ANSWER

## 2024-06-01 SDOH — SOCIAL STABILITY: SOCIAL INSECURITY
WITHIN THE LAST YEAR, HAVE YOU BEEN HUMILIATED OR EMOTIONALLY ABUSED IN OTHER WAYS BY YOUR PARTNER OR EX-PARTNER?: PATIENT UNABLE TO ANSWER

## 2024-06-01 SDOH — HEALTH STABILITY: MENTAL HEALTH: HOW OFTEN DO YOU HAVE 6 OR MORE DRINKS ON ONE OCCASION?: PATIENT UNABLE TO ANSWER

## 2024-06-01 SDOH — SOCIAL STABILITY: SOCIAL INSECURITY: HAS ANYONE EVER THREATENED TO HURT YOUR FAMILY OR YOUR PETS?: NO

## 2024-06-01 SDOH — SOCIAL STABILITY: SOCIAL INSECURITY: DO YOU FEEL ANYONE HAS EXPLOITED OR TAKEN ADVANTAGE OF YOU FINANCIALLY OR OF YOUR PERSONAL PROPERTY?: NO

## 2024-06-01 SDOH — SOCIAL STABILITY: SOCIAL INSECURITY: HAVE YOU HAD THOUGHTS OF HARMING ANYONE ELSE?: NO

## 2024-06-01 SDOH — ECONOMIC STABILITY: HOUSING INSECURITY
IN THE LAST 12 MONTHS, WAS THERE A TIME WHEN YOU DID NOT HAVE A STEADY PLACE TO SLEEP OR SLEPT IN A SHELTER (INCLUDING NOW)?: PATIENT UNABLE TO ANSWER

## 2024-06-01 SDOH — ECONOMIC STABILITY: INCOME INSECURITY
IN THE PAST 12 MONTHS, HAS THE ELECTRIC, GAS, OIL, OR WATER COMPANY THREATENED TO SHUT OFF SERVICE IN YOUR HOME?: PATIENT UNABLE TO ANSWER

## 2024-06-01 SDOH — SOCIAL STABILITY: SOCIAL INSECURITY: ARE THERE ANY APPARENT SIGNS OF INJURIES/BEHAVIORS THAT COULD BE RELATED TO ABUSE/NEGLECT?: NO

## 2024-06-01 SDOH — SOCIAL STABILITY: SOCIAL INSECURITY: DOES ANYONE TRY TO KEEP YOU FROM HAVING/CONTACTING OTHER FRIENDS OR DOING THINGS OUTSIDE YOUR HOME?: NO

## 2024-06-01 SDOH — HEALTH STABILITY: MENTAL HEALTH: HOW MANY STANDARD DRINKS CONTAINING ALCOHOL DO YOU HAVE ON A TYPICAL DAY?: PATIENT UNABLE TO ANSWER

## 2024-06-01 ASSESSMENT — LIFESTYLE VARIABLES
PRESCIPTION_ABUSE_PAST_12_MONTHS: NO
HOW OFTEN DO YOU HAVE A DRINK CONTAINING ALCOHOL: NEVER
SKIP TO QUESTIONS 9-10: 1
SKIP TO QUESTIONS 9-10: 0
EVER HAD A DRINK FIRST THING IN THE MORNING TO STEADY YOUR NERVES TO GET RID OF A HANGOVER: NO
EVER FELT BAD OR GUILTY ABOUT YOUR DRINKING: NO
HOW OFTEN DO YOU HAVE 6 OR MORE DRINKS ON ONE OCCASION: NEVER
HOW MANY STANDARD DRINKS CONTAINING ALCOHOL DO YOU HAVE ON A TYPICAL DAY: PATIENT DOES NOT DRINK
AUDIT-C TOTAL SCORE: -1
HAVE YOU EVER FELT YOU SHOULD CUT DOWN ON YOUR DRINKING: NO
AUDIT-C TOTAL SCORE: 0
SUBSTANCE_ABUSE_PAST_12_MONTHS: NO
AUDIT-C TOTAL SCORE: 0
HAVE PEOPLE ANNOYED YOU BY CRITICIZING YOUR DRINKING: NO
TOTAL SCORE: 0

## 2024-06-01 ASSESSMENT — PAIN SCALES - GENERAL
PAINLEVEL_OUTOF10: 0 - NO PAIN

## 2024-06-01 ASSESSMENT — ACTIVITIES OF DAILY LIVING (ADL)
ADEQUATE_TO_COMPLETE_ADL: YES
WALKS IN HOME: INDEPENDENT
FEEDING YOURSELF: INDEPENDENT
DRESSING YOURSELF: INDEPENDENT
JUDGMENT_ADEQUATE_SAFELY_COMPLETE_DAILY_ACTIVITIES: NO
BATHING: INDEPENDENT
HEARING - LEFT EAR: FUNCTIONAL
PATIENT'S MEMORY ADEQUATE TO SAFELY COMPLETE DAILY ACTIVITIES?: NO
HEARING - RIGHT EAR: FUNCTIONAL
LACK_OF_TRANSPORTATION: PATIENT UNABLE TO ANSWER
TOILETING: INDEPENDENT
GROOMING: INDEPENDENT

## 2024-06-01 ASSESSMENT — PATIENT HEALTH QUESTIONNAIRE - PHQ9
SUM OF ALL RESPONSES TO PHQ9 QUESTIONS 1 & 2: 0
1. LITTLE INTEREST OR PLEASURE IN DOING THINGS: NOT AT ALL
2. FEELING DOWN, DEPRESSED OR HOPELESS: NOT AT ALL

## 2024-06-01 ASSESSMENT — COGNITIVE AND FUNCTIONAL STATUS - GENERAL
MOVING TO AND FROM BED TO CHAIR: A LITTLE
DRESSING REGULAR UPPER BODY CLOTHING: A LITTLE
TURNING FROM BACK TO SIDE WHILE IN FLAT BAD: A LITTLE
STANDING UP FROM CHAIR USING ARMS: A LITTLE
DAILY ACTIVITIY SCORE: 18
TOILETING: A LITTLE
CLIMB 3 TO 5 STEPS WITH RAILING: A LITTLE
EATING MEALS: A LITTLE
WALKING IN HOSPITAL ROOM: A LITTLE
MOVING FROM LYING ON BACK TO SITTING ON SIDE OF FLAT BED WITH BEDRAILS: A LITTLE
PERSONAL GROOMING: A LITTLE
DRESSING REGULAR LOWER BODY CLOTHING: A LITTLE
PATIENT BASELINE BEDBOUND: NO
HELP NEEDED FOR BATHING: A LITTLE
MOBILITY SCORE: 18

## 2024-06-01 ASSESSMENT — PAIN - FUNCTIONAL ASSESSMENT
PAIN_FUNCTIONAL_ASSESSMENT: 0-10
PAIN_FUNCTIONAL_ASSESSMENT: 0-10

## 2024-06-01 ASSESSMENT — COLUMBIA-SUICIDE SEVERITY RATING SCALE - C-SSRS
6. HAVE YOU EVER DONE ANYTHING, STARTED TO DO ANYTHING, OR PREPARED TO DO ANYTHING TO END YOUR LIFE?: NO
1. IN THE PAST MONTH, HAVE YOU WISHED YOU WERE DEAD OR WISHED YOU COULD GO TO SLEEP AND NOT WAKE UP?: NO
2. HAVE YOU ACTUALLY HAD ANY THOUGHTS OF KILLING YOURSELF?: NO

## 2024-06-01 NOTE — PROGRESS NOTES
Pharmacy Medication History Review    Cheko Jin is a 78 y.o. male admitted for No Principal Problem: There is no principal problem currently on the Problem List. Please update the Problem List and refresh.. Pharmacy reviewed the patient's [id number] medications and allergies for accuracy.    The list below reflectives the updated PTA list. Please review each medication in order reconciliation for additional clarification and justification.  Prior to Admission medications    Medication Sig Start Date End Date Taking? Authorizing Provider   allopurinol (Zyloprim) 300 mg tablet Take 0.5 tablets (150 mg) by mouth once daily.   Yes Historical Provider, MD   amiodarone (Pacerone) 200 mg tablet Take 1 tablet (200 mg) by mouth once daily.   Yes Historical Provider, MD   atorvastatin (Lipitor) 10 mg tablet Take 1 tablet (10 mg) by mouth once daily.   Yes Historical Provider, MD   dapagliflozin propanediol (Farxiga) 5 mg Take 1 tablet (5 mg) by mouth once daily.  NEW - did not start yet due to insurance requiring a PA   Yes Historical Provider, MD   finasteride (Proscar) 5 mg tablet Take 1 tablet (5 mg) by mouth once daily.   Yes Historical Provider, MD   furosemide (Lasix) 40 mg tablet Take 1 tablet (40 mg) by mouth once daily.   Yes Historical Provider, MD   hydrALAZINE (Apresoline) 25 mg tablet Take 1 tablet (25 mg) by mouth twice a day.   Yes Historical Provider, MD   isosorbide dinitrate (Isordil) 20 mg tablet Take 1 tablet (20 mg) by mouth twice a day.   Yes Historical Provider, MD   metoprolol succinate XL (Toprol-XL) 50 mg 24 hr tablet Take 1 tablet (50 mg) by mouth once daily.   Yes Historical Provider, MD   tamsulosin (Flomax) 0.4 mg 24 hr capsule Take 1 capsule (0.4 mg) by mouth once daily.   Yes Historical Provider, MD   warfarin (Coumadin) 5 mg tablet Take 1 tablet (5 mg) by mouth once daily. Or as directed   Yes Historical Provider, MD   cholecalciferol (Vitamin D-3) 50 MCG (2000 UT) tablet Take 1  tablet (50 mcg) by mouth once daily.    Historical Provider, MD   multivitamin with minerals tablet Take 1 tablet by mouth once daily.    Historical Provider, MD        The list below reflectives the updated allergy list. Please review each documented allergy for additional clarification and justification.  Allergies  Reviewed by Martha Apodaca RN on 6/1/2024        Severity Reactions Comments    Cetirizine Low Myalgia     Isosorbide Low Headache     Levofloxacin Low Constipation     Lisinopril Low Cough     Loratadine Low Myalgia     Statins-hmg-coa Reductase Inhibitors Low Myalgia             Below are additional concerns with the patient's PTA list.      Meenakshi Hines

## 2024-06-01 NOTE — H&P
Hospital Medicine History & Physical       Patient Name: Cheko Jin   YOB: 1945    Subjective:    Cheko Jin is a 78 y.o. male who presents to the hospital with complaints of trauma.  Patient has physical signs symptoms of trauma but cannot tell us what happened.  He was originally sent to an urgent care by his, I believe, significant other and then sent to the emergency department for further evaluation.  Patient is quite confused.  Most of this history is obtained by chart review.  Patient was admitted to a F facility with atrial flutter with RVR.  He underwent GLEN guided cardioversion on 5/8/2024 which successfully converted into normal sinus rhythm.  His ejection fraction was noted to be extremely reduced at about 10%.  He was also noted to have some nonsustained runs of V. tach.  He underwent left heart cath without significant disease.  Cardiology optimized his medications and patient was discharged to a skilled nursing facility with a LifeVest and a Lovenox bridge to Coumadin due to inability to afford a DOAC. He went to a [facility] SNF for a few days and was discharged home.  Seems like he was not taught how to inject the Lovenox for bridging and did not know how to wear the LifeVest.  He arrived unannounced at his PCPs office in the left due to wait time.  [facility] was contacted as were his emergency contacts.  In subsequent visit his PCP has filed for guardianship and filled out a statement of expert evaluation for [facility] declining patient to lack capacity.    Emergency department workup showed a left gluteal intramuscular hemorrhage.  An age-indeterminate comminuted nondisplaced fracture involving the spinal process of C5.  The emergency department did speak to one of the patient's surrogate decision makers who is in [facility].  I am told they are on the way have expressed their concerns over his independence.     A 10  "point ROS was completed and is negative expect as stated in HPI.     Past Medical History:  Dilated nonischemic cardiomyopathy  Atrial flutter  Nonobstructive CAD  Advanced dementia  CKD III  Hypertension  GOMEZ on CPAP  History of bladder cancer  History of CVA    Past Surgical History:  GLEN guided cardioversion  Left knee arthroscopy  Bilateral hernia repair  TURBT/cystoscopy    Social History: Tobacco - Never, Alcohol - history unreliable, Recreational Drugs - none    Family History: family history includes No Known Problems in his father and mother.     Objective:    /63   Pulse 51   Temp 36.5 °C (97.7 °F)   Resp 10   Ht 1.753 m (5' 9\")   Wt 79.4 kg (175 lb)   SpO2 94%   BMI 25.84 kg/m²     Physical Exam:    GENERAL: No distress.  Alert and cooperative.  HEENT: Contusion on his scalp  CARDIOVASCULAR: Regular and bradycardic   RESPIRATORY: Clear to auscultation b/l. No wheezes, rales or rhonchi. Normal effort.   ABDOMEN: Soft, non-tender. Not distended. No rebound or guarding.  MUSCULOSKELETAL: Left buttock has some bruising, the area is not tense  EXTREMITIES: No peripheral edema.  NEUROLOGICAL: A&O X 3. CN II-XII are grossly intact. No focal deficits.   SKIN: Warm and dry, no lesions, no rashes.  PSYCH: Appropriate mood and affect.     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  Per radiologist there is no hematoma formation yet.  However we will go ahead and give him vitamin K to bring his INR down.  Hold Coumadin consults to trauma service and neurosurgery " service.  Patient's PCP has filed expert evaluation/guardianship paperwork with [facility].  Consult social work to continue this process as patient at this point should not be making decisions on his own, operating motor vehicles, living independently.  Am told a surrogate decision maker is coming in from [place] and should be here tomorrow.  Imaging findings of basilar artery atherosclerosis versus thrombus are likely not contributing to the current presentation.  Neurology was consulted from the emergency department and will follow up the recommendations.  Will continue his cardiac medications which include amiodarone, beta-blockade, Imdur/hydralazine, SGLT2 inhibitor and loop diuretic.  He is wearing a LifeVest.  At this point his cardiac status is compensated and he does not need telemetry.  Coumadin is on hold  Unclear baseline renal function but PCP notes that he has CKD III.  Monitor trend      DVT Prophylaxis: SCDs  Code Status: Assumed Full Code, goals of care discussion when next of kin is here  Disposition: TBMILA Olivarez, DO  Hospital Medicine

## 2024-06-01 NOTE — ED PROVIDER NOTES
HPI   No chief complaint on file.      Brought in by EMS from urgent care as a limited trauma.  Patient has obvious trauma to the top of his head as well as bruising to his abdomen.  He is on warfarin.  Patient states that he is not sure why he is in the emergency department.  He does not remember why he went to urgent care, but states that he did drive himself to urgent care.  He currently has no complaints.  He states that he supposed to see his physician soon, but cannot tell me what he sees a physician for.  He cannot tell me any of his past medical history.  He does have a Lifepak on; I ask about this, but he cannot provide any details.      History provided by:  Patient and EMS personnel   used: No                        Lanette Coma Scale Score: 14                     Patient History   Past Medical History:   Diagnosis Date    STUART (acute kidney injury) (CMS-HCC)     Atherosclerotic heart disease of native coronary artery without angina pectoris     Atrial fibrillation (Multi)     CHF (congestive heart failure) (Multi)     CKD (chronic kidney disease)     Gout     Hypertension     Malignant neoplasm of bladder, unspecified (Multi)     Personal history of other diseases of the digestive system     History of hiatal hernia    Personal history of other diseases of the musculoskeletal system and connective tissue     History of arthritis    Personal history of other diseases of the nervous system and sense organs     History of sleep apnea     Past Surgical History:   Procedure Laterality Date    OTHER SURGICAL HISTORY  07/19/2019    Hernia repair     Family History   Problem Relation Name Age of Onset    No Known Problems Mother      No Known Problems Father       Social History     Tobacco Use    Smoking status: Not on file    Smokeless tobacco: Not on file   Substance Use Topics    Alcohol use: Not on file    Drug use: Not on file       Physical Exam   ED Triage Vitals [06/01/24 1559]    Temperature Heart Rate Respirations BP   36.4 °C (97.5 °F) 53 16 112/57      Pulse Ox Temp src Heart Rate Source Patient Position   (!) 89 % -- -- --      BP Location FiO2 (%)     -- --       Physical Exam  Vitals and nursing note reviewed.   HENT:      Head:      Comments: Abrasion/superficial laceration to the scalp that is not actively bleeding.  Unclear chronicity.     Mouth/Throat:      Mouth: Mucous membranes are moist.   Eyes:      Extraocular Movements: Extraocular movements intact.      Conjunctiva/sclera: Conjunctivae normal.      Pupils: Pupils are equal, round, and reactive to light.   Cardiovascular:      Rate and Rhythm: Normal rate and regular rhythm.      Pulses: Normal pulses.      Comments: Patient is a Lifepak present.  Pulmonary:      Effort: Pulmonary effort is normal.      Breath sounds: Normal breath sounds.   Abdominal:      Palpations: Abdomen is soft.      Tenderness: There is no abdominal tenderness.   Musculoskeletal:      Cervical back: Normal range of motion. No tenderness.      Comments: No spinal tenderness palpation.  Pelvis stable.  All extremities: No obvious deformities.  No bony tenderness palpation.  Full active range of motion.  Neurovascularly intact.   Skin:     General: Skin is warm and dry.   Neurological:      Mental Status: He is alert.      Comments: GCS 15.  Moving all extremities         ED Course & MDM   ED Course as of 06/01/24 1925   Sat Jun 01, 2024   1600 Called the patient significant other, Ophelia, at the number listed in the chart.  She states that she had lunch with the patient today.  She states that she did note the wound.  Patient not recall falling.  She not see the patient fall.  She is not sure when he sustained this wound.  She recommended he go to an urgent care for further evaluation, which is why the patient originally presented to an urgent care.  When asked the significant other about the Lifepak, she states that the patient has had this on for  the past 3 to 4 days.  She states that the patient's cardiologist, whose name she does not know, told him to wear this.  She states that he was acting his usual self today. [AB]   1636 CT head W O contrast trauma protocol  Per my view, no obvious intracranial bleed. [AB]   1636 CT cervical spine wo IV contrast  Per my view, there is no obvious cervical fracture [AB]   1637 XR pelvis 1-2 views  Per my view, patient is status post right total hip replacement.  There is no obvious pelvic fracture or hip dislocation. [AB]   1638 XR chest 1 view  Per my view, no obvious pneumothorax or focal opacity.  Cardiomegaly noted. [AB]   1706 CT head W O contrast trauma protocol  Radiology read with the following:    Atherosclerotic wall calcification versus thrombus involving the  basilar artery. Correlation with MRI/MRA recommended for better  assessment.    Will reach out to neurology based on this read.  Unclear last known well. [AB]   1720 Spoke with Dr. Tolbert with Neurology.  States that CTA would likely be better.  Does state that we can likely hydrate the patient and obtain a CT angiogram tomorrow to further evaluate. [AB]   1721 CT cervical spine wo IV contrast [AB]   1736 INR(!!): 5.9  No active bleeding on exam.  HDS.  CT C/A/P still pending.  No indication to reverse at this time. [AB]   1744 Blood Urea Nitrogen(!): 64 [AB]   1744 Creatinine(!): 2.70  Cr 2.2 with BUN 22 on 5/22/24 [AB]   1745 SODIUM(!): 131  Na 141 on 5/22/24 [AB]   1745 Tetanus updated 7/2023 per chart review [AB]   1804 CT chest abdomen pelvis w IV contrast  Per my view, there is no obvious pneumothorax.  There is no obvious free fluid in the abdomen.  There is no obvious pneumoperitoneum. [AB]      ED Course User Index  [AB] Praveen Shrestha MD         Diagnoses as of 06/01/24 1925   Hyponatremia   Elevated INR   Abrasion of scalp, initial encounter   Urinary tract infection without hematuria, site unspecified       Medical Decision  Making  Brought in by EMS from an urgent care as a limited trauma with obvious signs of trauma in the setting of being on warfarin.  He has obvious trauma to his head as well as bruising to his abdomen.  Per chart review, patient has a past medical history of atrial fibrillation, CHF, hypertension, malignant neoplasm of the bladder, CKD, gout, hyperlipidemia, and TIA.  The events of why he presented originally to the urgent care unclear.  I did speak with the patient's significant other, as per the ED course, for further collateral information; however, significant other provided limited additional information in terms of when the patient fell.    On my initial evaluation, he is nontoxic-appearing.  He is hemodynamically stable.  His primary survey is intact.  Given his signs of trauma, will obtain extensive imaging.  As he has a Lifepak in place, will also obtain a cardiac workup.  I anticipate admission.    Imaging reviewed.    Discussed the CT of the head without contrast findings with the neurologist, as per the ED course.  Will likely need CT angiogram tomorrow.    CT C-spine findings concerning for spinous process fracture requires no acute intervention.  Patient has no pain on exam.  No focal neurologic deficits.    CT of the chest, abdomen, and pelvis reviewed.  Patient's skin reevaluated after notes of hematoma.  No breaks in the skin or tense hematoma.  No acute interventions required.    Patient was found to be hyponatremic as well as with UA suggestive of UTI.  This certainly could be contributing to altered mentation; however, the patient's significant other states that he was at his baseline earlier today.  Nonetheless, I do not think that the patient is safe for discharge at this time, as he reported lives by himself and is still driving himself places, which sounds very concerning.    Discussed with the admitting hospitalist.    Amount and/or Complexity of Data Reviewed  Independent Historian:  EMS  Labs: ordered.  Radiology: ordered and independent interpretation performed. Decision-making details documented in ED Course.  ECG/medicine tests: ordered and independent interpretation performed.     Details: My ECG interpretation: Normal sinus rhythm, heart 51, no ST segment elevation    Risk  Decision regarding hospitalization.        Procedure  Critical Care    Performed by: Praveen Shrestha MD  Authorized by: Praveen Shrestha MD    Critical care provider statement:     Critical care time (minutes):  40    Critical care time was exclusive of:  Separately billable procedures and treating other patients    Critical care was necessary to treat or prevent imminent or life-threatening deterioration of the following conditions:  Trauma    Critical care was time spent personally by me on the following activities:  Blood draw for specimens, development of treatment plan with patient or surrogate, discussions with consultants, discussions with primary provider, examination of patient, obtaining history from patient or surrogate, ordering and performing treatments and interventions, ordering and review of laboratory studies, ordering and review of radiographic studies, pulse oximetry and re-evaluation of patient's condition    Care discussed with: admitting provider         Praveen Shrestha MD  06/01/24 1013

## 2024-06-02 LAB
ANION GAP SERPL CALC-SCNC: 14 MMOL/L (ref 10–20)
BUN SERPL-MCNC: 63 MG/DL (ref 6–23)
CALCIUM SERPL-MCNC: 8.5 MG/DL (ref 8.6–10.3)
CHLORIDE SERPL-SCNC: 101 MMOL/L (ref 98–107)
CO2 SERPL-SCNC: 25 MMOL/L (ref 21–32)
CREAT SERPL-MCNC: 2.73 MG/DL (ref 0.5–1.3)
EGFRCR SERPLBLD CKD-EPI 2021: 23 ML/MIN/1.73M*2
ERYTHROCYTE [DISTWIDTH] IN BLOOD BY AUTOMATED COUNT: 13.9 % (ref 11.5–14.5)
GLUCOSE SERPL-MCNC: 104 MG/DL (ref 74–99)
HCT VFR BLD AUTO: 38.4 % (ref 41–52)
HGB BLD-MCNC: 13.1 G/DL (ref 13.5–17.5)
HOLD SPECIMEN: NORMAL
INR PPP: 3.4 (ref 0.9–1.1)
MCH RBC QN AUTO: 31.3 PG (ref 26–34)
MCHC RBC AUTO-ENTMCNC: 34.1 G/DL (ref 32–36)
MCV RBC AUTO: 92 FL (ref 80–100)
NRBC BLD-RTO: 0 /100 WBCS (ref 0–0)
PLATELET # BLD AUTO: 131 X10*3/UL (ref 150–450)
POTASSIUM SERPL-SCNC: 3.7 MMOL/L (ref 3.5–5.3)
PROTHROMBIN TIME: 38.5 SECONDS (ref 9.8–12.8)
RBC # BLD AUTO: 4.19 X10*6/UL (ref 4.5–5.9)
SODIUM SERPL-SCNC: 136 MMOL/L (ref 136–145)
WBC # BLD AUTO: 6.3 X10*3/UL (ref 4.4–11.3)

## 2024-06-02 PROCEDURE — 85027 COMPLETE CBC AUTOMATED: CPT | Performed by: INTERNAL MEDICINE

## 2024-06-02 PROCEDURE — 85610 PROTHROMBIN TIME: CPT | Performed by: INTERNAL MEDICINE

## 2024-06-02 PROCEDURE — 2500000002 HC RX 250 W HCPCS SELF ADMINISTERED DRUGS (ALT 637 FOR MEDICARE OP, ALT 636 FOR OP/ED): Performed by: INTERNAL MEDICINE

## 2024-06-02 PROCEDURE — 80048 BASIC METABOLIC PNL TOTAL CA: CPT | Performed by: INTERNAL MEDICINE

## 2024-06-02 PROCEDURE — 99221 1ST HOSP IP/OBS SF/LOW 40: CPT | Performed by: SURGERY

## 2024-06-02 PROCEDURE — 2500000001 HC RX 250 WO HCPCS SELF ADMINISTERED DRUGS (ALT 637 FOR MEDICARE OP): Performed by: NURSE PRACTITIONER

## 2024-06-02 PROCEDURE — 99223 1ST HOSP IP/OBS HIGH 75: CPT | Performed by: PSYCHIATRY & NEUROLOGY

## 2024-06-02 PROCEDURE — 99221 1ST HOSP IP/OBS SF/LOW 40: CPT | Performed by: NEUROLOGICAL SURGERY

## 2024-06-02 PROCEDURE — 99232 SBSQ HOSP IP/OBS MODERATE 35: CPT | Performed by: INTERNAL MEDICINE

## 2024-06-02 PROCEDURE — 1200000002 HC GENERAL ROOM WITH TELEMETRY DAILY

## 2024-06-02 PROCEDURE — 2500000004 HC RX 250 GENERAL PHARMACY W/ HCPCS (ALT 636 FOR OP/ED): Performed by: NURSE PRACTITIONER

## 2024-06-02 PROCEDURE — 2500000001 HC RX 250 WO HCPCS SELF ADMINISTERED DRUGS (ALT 637 FOR MEDICARE OP): Performed by: INTERNAL MEDICINE

## 2024-06-02 PROCEDURE — 36415 COLL VENOUS BLD VENIPUNCTURE: CPT | Performed by: INTERNAL MEDICINE

## 2024-06-02 RX ORDER — DOCUSATE SODIUM 100 MG/1
100 CAPSULE, LIQUID FILLED ORAL 2 TIMES DAILY
Status: DISCONTINUED | OUTPATIENT
Start: 2024-06-02 | End: 2024-06-12 | Stop reason: HOSPADM

## 2024-06-02 RX ORDER — CEFTRIAXONE 1 G/50ML
1 INJECTION, SOLUTION INTRAVENOUS EVERY 24 HOURS
Status: DISCONTINUED | OUTPATIENT
Start: 2024-06-02 | End: 2024-06-03

## 2024-06-02 RX ADMIN — DOCUSATE SODIUM 100 MG: 100 CAPSULE, LIQUID FILLED ORAL at 09:15

## 2024-06-02 RX ADMIN — ALLOPURINOL 150 MG: 300 TABLET ORAL at 09:15

## 2024-06-02 RX ADMIN — FUROSEMIDE 40 MG: 40 TABLET ORAL at 09:15

## 2024-06-02 RX ADMIN — METOPROLOL SUCCINATE 50 MG: 50 TABLET, EXTENDED RELEASE ORAL at 09:00

## 2024-06-02 RX ADMIN — AMIODARONE HYDROCHLORIDE 200 MG: 200 TABLET ORAL at 09:00

## 2024-06-02 RX ADMIN — ATORVASTATIN CALCIUM 10 MG: 10 TABLET, FILM COATED ORAL at 09:15

## 2024-06-02 RX ADMIN — DOCUSATE SODIUM 100 MG: 100 CAPSULE, LIQUID FILLED ORAL at 21:12

## 2024-06-02 RX ADMIN — DAPAGLIFLOZIN 5 MG: 5 TABLET, FILM COATED ORAL at 09:15

## 2024-06-02 RX ADMIN — CEFTRIAXONE SODIUM 1 G: 1 INJECTION, SOLUTION INTRAVENOUS at 17:39

## 2024-06-02 RX ADMIN — CHOLECALCIFEROL TAB 25 MCG (1000 UNIT) 2000 UNITS: 25 TAB at 09:15

## 2024-06-02 RX ADMIN — TAMSULOSIN HYDROCHLORIDE 0.4 MG: 0.4 CAPSULE ORAL at 09:15

## 2024-06-02 RX ADMIN — HYDRALAZINE HYDROCHLORIDE 25 MG: 25 TABLET, FILM COATED ORAL at 21:12

## 2024-06-02 RX ADMIN — ISOSORBIDE DINITRATE 20 MG: 10 TABLET ORAL at 09:00

## 2024-06-02 RX ADMIN — FINASTERIDE 5 MG: 5 TABLET, FILM COATED ORAL at 09:15

## 2024-06-02 ASSESSMENT — COGNITIVE AND FUNCTIONAL STATUS - GENERAL
PERSONAL GROOMING: A LITTLE
DRESSING REGULAR LOWER BODY CLOTHING: A LITTLE
MOBILITY SCORE: 18
MOVING TO AND FROM BED TO CHAIR: A LITTLE
HELP NEEDED FOR BATHING: A LITTLE
EATING MEALS: A LITTLE
DAILY ACTIVITIY SCORE: 23
TOILETING: A LITTLE
DRESSING REGULAR UPPER BODY CLOTHING: A LITTLE
CLIMB 3 TO 5 STEPS WITH RAILING: A LITTLE
DRESSING REGULAR LOWER BODY CLOTHING: A LITTLE
DAILY ACTIVITIY SCORE: 18
MOBILITY SCORE: 24
STANDING UP FROM CHAIR USING ARMS: A LITTLE
WALKING IN HOSPITAL ROOM: A LITTLE
TURNING FROM BACK TO SIDE WHILE IN FLAT BAD: A LITTLE
MOVING FROM LYING ON BACK TO SITTING ON SIDE OF FLAT BED WITH BEDRAILS: A LITTLE

## 2024-06-02 ASSESSMENT — PAIN SCALES - GENERAL
PAINLEVEL_OUTOF10: 0 - NO PAIN
PAINLEVEL_OUTOF10: 0 - NO PAIN

## 2024-06-02 NOTE — PROGRESS NOTES
Subjective  Pt in good spirits and making jokes. He denies any pain and SOB. He is oriented to self, location, city, state and president but not year.     Objective  I personally reviewed all pertinent labwork, imaging and vital signs, as well as nursing, therapy, discharge planning and consult notes.     Vitals:    06/02/24 0738   BP: 118/64   Pulse: 58   Resp: 18   Temp: 36.4 °C (97.5 °F)   SpO2: 91%       Vitals:    06/01/24 1613   Weight: 79.4 kg (175 lb)       Scheduled medications  allopurinol, 150 mg, oral, Daily  amiodarone, 200 mg, oral, Daily  atorvastatin, 10 mg, oral, Daily  cefTRIAXone, 1 g, intravenous, q24h  cholecalciferol, 2,000 Units, oral, Daily  dapagliflozin propanediol, 5 mg, oral, Daily  docusate sodium, 100 mg, oral, BID  finasteride, 5 mg, oral, Daily  [Held by provider] furosemide, 40 mg, oral, Daily  hydrALAZINE, 25 mg, oral, BID  isosorbide dinitrate, 20 mg, oral, Daily  metoprolol succinate XL, 50 mg, oral, Daily  tamsulosin, 0.4 mg, oral, Daily      Continuous medications     PRN medications  PRN medications: acetaminophen **OR** [DISCONTINUED] acetaminophen **OR** [DISCONTINUED] acetaminophen, melatonin    Results for orders placed or performed during the hospital encounter of 06/01/24 (from the past 24 hour(s))   Magnesium   Result Value Ref Range    Magnesium 1.99 1.60 - 2.40 mg/dL   CBC and Auto Differential   Result Value Ref Range    WBC 8.5 4.4 - 11.3 x10*3/uL    nRBC 0.0 0.0 - 0.0 /100 WBCs    RBC 4.18 (L) 4.50 - 5.90 x10*6/uL    Hemoglobin 13.2 (L) 13.5 - 17.5 g/dL    Hematocrit 39.1 (L) 41.0 - 52.0 %    MCV 94 80 - 100 fL    MCH 31.6 26.0 - 34.0 pg    MCHC 33.8 32.0 - 36.0 g/dL    RDW 13.8 11.5 - 14.5 %    Platelets 150 150 - 450 x10*3/uL    Neutrophils % 87.3 40.0 - 80.0 %    Immature Granulocytes %, Automated 0.5 0.0 - 0.9 %    Lymphocytes % 4.1 13.0 - 44.0 %    Monocytes % 8.0 2.0 - 10.0 %    Eosinophils % 0.0 0.0 - 6.0 %    Basophils % 0.1 0.0 - 2.0 %    Neutrophils  Absolute 7.42 (H) 1.60 - 5.50 x10*3/uL    Immature Granulocytes Absolute, Automated 0.04 0.00 - 0.50 x10*3/uL    Lymphocytes Absolute 0.35 (L) 0.80 - 3.00 x10*3/uL    Monocytes Absolute 0.68 0.05 - 0.80 x10*3/uL    Eosinophils Absolute 0.00 0.00 - 0.40 x10*3/uL    Basophils Absolute 0.01 0.00 - 0.10 x10*3/uL   Comprehensive Metabolic Panel   Result Value Ref Range    Glucose 115 (H) 74 - 99 mg/dL    Sodium 131 (L) 136 - 145 mmol/L    Potassium 3.9 3.5 - 5.3 mmol/L    Chloride 99 98 - 107 mmol/L    Bicarbonate 25 21 - 32 mmol/L    Anion Gap 11 10 - 20 mmol/L    Urea Nitrogen 64 (H) 6 - 23 mg/dL    Creatinine 2.70 (H) 0.50 - 1.30 mg/dL    eGFR 23 (L) >60 mL/min/1.73m*2    Calcium 8.3 (L) 8.6 - 10.3 mg/dL    Albumin 3.6 3.4 - 5.0 g/dL    Alkaline Phosphatase 46 33 - 136 U/L    Total Protein 5.8 (L) 6.4 - 8.2 g/dL    AST 31 9 - 39 U/L    Bilirubin, Total 1.1 0.0 - 1.2 mg/dL    ALT 18 10 - 52 U/L   Alcohol   Result Value Ref Range    Alcohol <10 <=10 mg/dL   Lactate   Result Value Ref Range    Lactate 1.4 0.4 - 2.0 mmol/L   Protime-INR   Result Value Ref Range    Protime 68.2 (HH) 9.8 - 12.8 seconds    INR 5.9 (HH) 0.9 - 1.1   Type And Screen   Result Value Ref Range    ABO TYPE O     Rh TYPE POS     ANTIBODY SCREEN NEG    Troponin I, High Sensitivity, Initial   Result Value Ref Range    Troponin I, High Sensitivity 16 0 - 20 ng/L   Troponin, High Sensitivity, 1 Hour   Result Value Ref Range    Troponin I, High Sensitivity 14 0 - 20 ng/L   Urinalysis with Reflex Culture and Microscopic   Result Value Ref Range    Color, Urine Colorless (N) Light-Yellow, Yellow, Dark-Yellow    Appearance, Urine Clear Clear    Specific Gravity, Urine 1.021 1.005 - 1.035    pH, Urine 5.5 5.0, 5.5, 6.0, 6.5, 7.0, 7.5, 8.0    Protein, Urine NEGATIVE NEGATIVE, 10 (TRACE), 20 (TRACE) mg/dL    Glucose, Urine Normal Normal mg/dL    Blood, Urine NEGATIVE NEGATIVE    Ketones, Urine NEGATIVE NEGATIVE mg/dL    Bilirubin, Urine NEGATIVE NEGATIVE     Urobilinogen, Urine Normal Normal mg/dL    Nitrite, Urine NEGATIVE NEGATIVE    Leukocyte Esterase, Urine 25 Genny/µL (A) NEGATIVE   Extra Urine Gray Tube   Result Value Ref Range    Extra Tube Hold for add-ons.    Microscopic Only, Urine   Result Value Ref Range    WBC, Urine 11-20 (A) 1-5, NONE /HPF    RBC, Urine NONE NONE, 1-2, 3-5 /HPF    Bacteria, Urine 1+ (A) NONE SEEN /HPF    Mucus, Urine FEW Reference range not established. /LPF    Hyaline Casts, Urine 1+ (A) NONE /LPF   CBC   Result Value Ref Range    WBC 6.3 4.4 - 11.3 x10*3/uL    nRBC 0.0 0.0 - 0.0 /100 WBCs    RBC 4.19 (L) 4.50 - 5.90 x10*6/uL    Hemoglobin 13.1 (L) 13.5 - 17.5 g/dL    Hematocrit 38.4 (L) 41.0 - 52.0 %    MCV 92 80 - 100 fL    MCH 31.3 26.0 - 34.0 pg    MCHC 34.1 32.0 - 36.0 g/dL    RDW 13.9 11.5 - 14.5 %    Platelets 131 (L) 150 - 450 x10*3/uL   Basic metabolic panel   Result Value Ref Range    Glucose 104 (H) 74 - 99 mg/dL    Sodium 136 136 - 145 mmol/L    Potassium 3.7 3.5 - 5.3 mmol/L    Chloride 101 98 - 107 mmol/L    Bicarbonate 25 21 - 32 mmol/L    Anion Gap 14 10 - 20 mmol/L    Urea Nitrogen 63 (H) 6 - 23 mg/dL    Creatinine 2.73 (H) 0.50 - 1.30 mg/dL    eGFR 23 (L) >60 mL/min/1.73m*2    Calcium 8.5 (L) 8.6 - 10.3 mg/dL   Protime-INR   Result Value Ref Range    Protime 38.5 (H) 9.8 - 12.8 seconds    INR 3.4 (H) 0.9 - 1.1       Constitutional: Well developed, awake, alert, calm, oriented x3, no acute distress, cooperative   Eyes: EOMI, clear sclera   ENMT: mucous membranes moist, no lesions seen   Head/Neck: Neck supple, no apparent injury, laceration noted to top of head with scant serous discharge   Respiratory/Thorax: CTAB, good chest expansion, respirations even and unlabored   Cardiovascular: Regular rate and rhythm, no murmurs/rubs/gallops, normal S1 and S 2, Lifevest in place   Gastrointestinal: Abdomen nondistended, soft, nontender, +BS, no bruits   Musculoskeletal: ROM intact, no joint swelling, normal  strength    Extremities: no cyanosis, edema, contusions or clubbing   Neurological: no focal deficit, pt alert and oriented x3   Psychological: Appropriate affect and behavior, pleasant   Skin: Warm and dry, no lesions, no rashes       Past Medical History:   Diagnosis Date    Alzheimer's dementia (Multi)     Atrial flutter (Multi)     CAD (coronary artery disease)     nonobstructive    CKD (chronic kidney disease)     Dilated cardiomyopathy (Multi)     Gout     HFrEF (heart failure with reduced ejection fraction) (Multi)     Hypertension     Malignant neoplasm of bladder, unspecified (Multi)     NSVT (nonsustained ventricular tachycardia) (Multi)     Lifevest placed    Sleep apnea     Stroke (cerebrum) (Multi)     3 strokes R frontal, parietal lobes (July, 2010, September, 2010 and October, 2010), etiology embolic due to 40-50% ulcerated R ICA plaque s/p R CEA 10/12/10        Assessment/Plan  Trauma/mechanical fall  Left gluteus dina intramuscular hemorrhage     - per radiologist there is no hematoma formation yet, warfarin has been  reversed with vitamin K and dose is held for now     - trauma service on board, hold warfarin one more day and reevaluate  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     - neurology on board, MR imaging ordered  Age-indeterminate C5 spinous process fracture     - neurosurgery consulted  A flutter on warfarin  Supratherapeutic INR     - s/p cardioversion 5/8/24 at [facility], currently maintaining NSR in the 50s   HFrEF/dilated CM     -recent ejection fraction noted to be extremely reduced at about 10%, pt had nonsustained runs of V-tach     - LifeVest placed earlier this month during admission at [facility], continue GDMT  UTI     - continue ceftriaxone pending urine cx  Advanced Alzheimers dementia      - pt's PCP has filed expert evaluation/guardianship paperwork with [employer], social work consulted to continue this process      - pt has surrogate decision maker who coming in from [facility]  STUART on  CKD III     - baseline Scr 1.4-1.8, hold Lasix for now  Hx embolic strokes in 2010     - pt on warfarin as above  Nonobstructive CAD     - pt underwent left heart cath at  [facility] without significant disease    Hypertension     - BP a little low, continue BP meds with parameters  DVT ppx     - pt on warfarin, held for supertherapeutic INR  Discharge disposition     - pending PT/OT sheryl Christian, CNP  Hospital Medicine

## 2024-06-02 NOTE — CONSULTS
Inpatient consult to Neurology  Consult performed by: Carmen Tolbert MD  Consult ordered by: Luan Olivarez DO      History Of Present Illness  Cheko Jin is a 78 y.o. male is admitted for confusion and fall.  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  Patient originally went to urgent care and is confused about timeline and what happened yesterday.  He had a recent hospital admission at [facility] for atrial flutter with RVR and underwent cardioversion.  His echo showed an ejection fraction at 10%.  He has a history of nonsustained V. tach.  He is wearing a LifeVest and is post to be taking Coumadin at home.   On admission he was found to have a left gluteal intramuscular hemorrhage as well as a nondisplaced fracture at C5.    Past medical history includes atrial flutter, coronary artery disease, dementia, chronic kidney disease, sleep apnea, stroke, dilated nonischemic cardiomyopathy.  He does not have any complaints this morning, waking up.    Past Medical History  Past Medical History:   Diagnosis Date    STUART (acute kidney injury) (CMS-HCC)     Atherosclerotic heart disease of native coronary artery without angina pectoris     Atrial fibrillation (Multi)     CHF (congestive heart failure) (Multi)     CKD (chronic kidney disease)     Gout     Hypertension     Malignant neoplasm of bladder, unspecified (Multi)     Personal history of other diseases of the digestive system     History of hiatal hernia    Personal history of other diseases of the musculoskeletal system and connective tissue     History of arthritis    Personal history of other diseases of the nervous system and sense organs     History of sleep apnea     Surgical History  Past Surgical History:   Procedure Laterality Date    OTHER SURGICAL HISTORY  07/19/2019    Hernia repair     Social History  Social History     Tobacco Use    Smoking status: Unknown     Passive exposure:  Never   Vaping Use    Vaping status: Unknown   Substance Use Topics    Alcohol use: Defer    Drug use: Defer     Allergies  Cetirizine, Isosorbide, Levofloxacin, Lisinopril, Loratadine, and Statins-hmg-coa reductase inhibitors  Medications Prior to Admission   Medication Sig Dispense Refill Last Dose    allopurinol (Zyloprim) 300 mg tablet Take 0.5 tablets (150 mg) by mouth once daily.   Unknown    amiodarone (Pacerone) 200 mg tablet Take 1 tablet (200 mg) by mouth once daily.   Unknown    atorvastatin (Lipitor) 10 mg tablet Take 1 tablet (10 mg) by mouth once daily.   Unknown    dapagliflozin propanediol (Farxiga) 5 mg Take 1 tablet (5 mg) by mouth once daily.       finasteride (Proscar) 5 mg tablet Take 1 tablet (5 mg) by mouth once daily.   Unknown    furosemide (Lasix) 40 mg tablet Take 1 tablet (40 mg) by mouth once daily.   Unknown    hydrALAZINE (Apresoline) 25 mg tablet Take 1 tablet (25 mg) by mouth twice a day.   Unknown    isosorbide dinitrate (Isordil) 20 mg tablet Take 1 tablet (20 mg) by mouth twice a day.   Unknown    metoprolol succinate XL (Toprol-XL) 50 mg 24 hr tablet Take 1 tablet (50 mg) by mouth once daily.   Unknown    tamsulosin (Flomax) 0.4 mg 24 hr capsule Take 1 capsule (0.4 mg) by mouth once daily.   Unknown    warfarin (Coumadin) 5 mg tablet Take 1 tablet (5 mg) by mouth once daily. Or as directed   Unknown    cholecalciferol (Vitamin D-3) 50 MCG (2000 UT) tablet Take 1 tablet (50 mcg) by mouth once daily.   Unknown    multivitamin with minerals tablet Take 1 tablet by mouth once daily.   Unknown       Review of Systems  Exam:   Appearance:  no acute distress  HEENT: scalp contusion  Cardiovascular/Lungs/Abdomen: No carotid bruits to auscultation bilaterally, heart is regular in rate and rhythm.  Extremities/Skin: no peripheral edema.    NEUROLOGICAL EXAMINATION:    Mental status:  states his name. Not sure what hospital it is or the month/ season/ date.  He follows commands. He asks  "repeated questions.     Cranial nerves:    II/III: Fundoscopic examination attempted at bedside, limited. Visual fields are full. Pupils are 2 mm and reactive bilaterally.  III/IV/VI: Extraocular movements are full with no nystagmus.   V: Facial sensation is intact to light touch.  VII: Face is symmetric.  VIII: hearing is intact bilaterally.  IX/X: Palate elevates symmetrically to phonation.  XI: Sternocleidomastoid is MRC 5/5 to strength testing.  XII: Tongue is midline.    Motor exam: Moves all extremities above gravity    Sensory exam: Sensation is intact to light touch throughout.    Reflexes: Reflexes are 2+ and symmetric. Bilateral plantar responses are flexor.    Coordination: intact     Last Recorded Vitals  Blood pressure 118/64, pulse 58, temperature 36.4 °C (97.5 °F), resp. rate 12, height 1.753 m (5' 9\"), weight 79.4 kg (175 lb), SpO2 91%.    Relevant Results  Scheduled medications  allopurinol, 150 mg, oral, Daily  amiodarone, 200 mg, oral, Daily  atorvastatin, 10 mg, oral, Daily  cholecalciferol, 2,000 Units, oral, Daily  dapagliflozin propanediol, 5 mg, oral, Daily  finasteride, 5 mg, oral, Daily  furosemide, 40 mg, oral, Daily  hydrALAZINE, 25 mg, oral, BID  isosorbide dinitrate, 20 mg, oral, Daily  metoprolol succinate XL, 50 mg, oral, Daily  tamsulosin, 0.4 mg, oral, Daily      Continuous medications     PRN medications  PRN medications: acetaminophen **OR** acetaminophen **OR** acetaminophen, melatonin  Results for orders placed or performed during the hospital encounter of 06/01/24 (from the past 24 hour(s))   Magnesium   Result Value Ref Range    Magnesium 1.99 1.60 - 2.40 mg/dL   CBC and Auto Differential   Result Value Ref Range    WBC 8.5 4.4 - 11.3 x10*3/uL    nRBC 0.0 0.0 - 0.0 /100 WBCs    RBC 4.18 (L) 4.50 - 5.90 x10*6/uL    Hemoglobin 13.2 (L) 13.5 - 17.5 g/dL    Hematocrit 39.1 (L) 41.0 - 52.0 %    MCV 94 80 - 100 fL    MCH 31.6 26.0 - 34.0 pg    MCHC 33.8 32.0 - 36.0 g/dL    RDW 13.8 " 11.5 - 14.5 %    Platelets 150 150 - 450 x10*3/uL    Neutrophils % 87.3 40.0 - 80.0 %    Immature Granulocytes %, Automated 0.5 0.0 - 0.9 %    Lymphocytes % 4.1 13.0 - 44.0 %    Monocytes % 8.0 2.0 - 10.0 %    Eosinophils % 0.0 0.0 - 6.0 %    Basophils % 0.1 0.0 - 2.0 %    Neutrophils Absolute 7.42 (H) 1.60 - 5.50 x10*3/uL    Immature Granulocytes Absolute, Automated 0.04 0.00 - 0.50 x10*3/uL    Lymphocytes Absolute 0.35 (L) 0.80 - 3.00 x10*3/uL    Monocytes Absolute 0.68 0.05 - 0.80 x10*3/uL    Eosinophils Absolute 0.00 0.00 - 0.40 x10*3/uL    Basophils Absolute 0.01 0.00 - 0.10 x10*3/uL   Comprehensive Metabolic Panel   Result Value Ref Range    Glucose 115 (H) 74 - 99 mg/dL    Sodium 131 (L) 136 - 145 mmol/L    Potassium 3.9 3.5 - 5.3 mmol/L    Chloride 99 98 - 107 mmol/L    Bicarbonate 25 21 - 32 mmol/L    Anion Gap 11 10 - 20 mmol/L    Urea Nitrogen 64 (H) 6 - 23 mg/dL    Creatinine 2.70 (H) 0.50 - 1.30 mg/dL    eGFR 23 (L) >60 mL/min/1.73m*2    Calcium 8.3 (L) 8.6 - 10.3 mg/dL    Albumin 3.6 3.4 - 5.0 g/dL    Alkaline Phosphatase 46 33 - 136 U/L    Total Protein 5.8 (L) 6.4 - 8.2 g/dL    AST 31 9 - 39 U/L    Bilirubin, Total 1.1 0.0 - 1.2 mg/dL    ALT 18 10 - 52 U/L   Alcohol   Result Value Ref Range    Alcohol <10 <=10 mg/dL   Lactate   Result Value Ref Range    Lactate 1.4 0.4 - 2.0 mmol/L   Protime-INR   Result Value Ref Range    Protime 68.2 (HH) 9.8 - 12.8 seconds    INR 5.9 (HH) 0.9 - 1.1   Type And Screen   Result Value Ref Range    ABO TYPE O     Rh TYPE POS     ANTIBODY SCREEN NEG    Troponin I, High Sensitivity, Initial   Result Value Ref Range    Troponin I, High Sensitivity 16 0 - 20 ng/L   Troponin, High Sensitivity, 1 Hour   Result Value Ref Range    Troponin I, High Sensitivity 14 0 - 20 ng/L   Urinalysis with Reflex Culture and Microscopic   Result Value Ref Range    Color, Urine Colorless (N) Light-Yellow, Yellow, Dark-Yellow    Appearance, Urine Clear Clear    Specific Gravity, Urine 1.021  1.005 - 1.035    pH, Urine 5.5 5.0, 5.5, 6.0, 6.5, 7.0, 7.5, 8.0    Protein, Urine NEGATIVE NEGATIVE, 10 (TRACE), 20 (TRACE) mg/dL    Glucose, Urine Normal Normal mg/dL    Blood, Urine NEGATIVE NEGATIVE    Ketones, Urine NEGATIVE NEGATIVE mg/dL    Bilirubin, Urine NEGATIVE NEGATIVE    Urobilinogen, Urine Normal Normal mg/dL    Nitrite, Urine NEGATIVE NEGATIVE    Leukocyte Esterase, Urine 25 Genny/µL (A) NEGATIVE   Extra Urine Gray Tube   Result Value Ref Range    Extra Tube Hold for add-ons.    Microscopic Only, Urine   Result Value Ref Range    WBC, Urine 11-20 (A) 1-5, NONE /HPF    RBC, Urine NONE NONE, 1-2, 3-5 /HPF    Bacteria, Urine 1+ (A) NONE SEEN /HPF    Mucus, Urine FEW Reference range not established. /LPF    Hyaline Casts, Urine 1+ (A) NONE /LPF   CBC   Result Value Ref Range    WBC 6.3 4.4 - 11.3 x10*3/uL    nRBC 0.0 0.0 - 0.0 /100 WBCs    RBC 4.19 (L) 4.50 - 5.90 x10*6/uL    Hemoglobin 13.1 (L) 13.5 - 17.5 g/dL    Hematocrit 38.4 (L) 41.0 - 52.0 %    MCV 92 80 - 100 fL    MCH 31.3 26.0 - 34.0 pg    MCHC 34.1 32.0 - 36.0 g/dL    RDW 13.9 11.5 - 14.5 %    Platelets 131 (L) 150 - 450 x10*3/uL   Basic metabolic panel   Result Value Ref Range    Glucose 104 (H) 74 - 99 mg/dL    Sodium 136 136 - 145 mmol/L    Potassium 3.7 3.5 - 5.3 mmol/L    Chloride 101 98 - 107 mmol/L    Bicarbonate 25 21 - 32 mmol/L    Anion Gap 14 10 - 20 mmol/L    Urea Nitrogen 63 (H) 6 - 23 mg/dL    Creatinine 2.73 (H) 0.50 - 1.30 mg/dL    eGFR 23 (L) >60 mL/min/1.73m*2    Calcium 8.5 (L) 8.6 - 10.3 mg/dL   Protime-INR   Result Value Ref Range    Protime 38.5 (H) 9.8 - 12.8 seconds    INR 3.4 (H) 0.9 - 1.1                    Lanette Coma Scale  Best Eye Response: Spontaneous  Best Verbal Response: Confused  Best Motor Response: Follows commands  Lanette Coma Scale Score: 14                 I have personally reviewed the following imaging results CT chest abdomen pelvis w IV contrast    Result Date: 6/1/2024  STUDY: CT Chest, Abdomen, and  Pelvis with IV Contrast; 06/01/2023 4:52 pm INDICATION: Trauma.  COMPARISON: None available.  ACCESSION NUMBER(S): [id number] ORDERING CLINICIAN: ELIZABETH AKHTAR TECHNIQUE: CT of the chest, abdomen, and pelvis was performed.  Contiguous axial images were obtained at 3 mm slice thickness through the chest, abdomen, and pelvis.  Coronal and sagittal reconstructions at 3 mm slice thickness were performed.  Omnipaque 350:90 mL was administered intravenously.  FINDINGS: CHEST: MEDIASTINUM: Atherosclerosis of the thoracic aorta and coronary arteries is present.  Thoracic aorta is not aneurysmal.  Heart is mildly enlarged.  No pericardial effusion.  LUNGS/PLEURA: There is no pleural effusion, pleural thickening, or pneumothorax. The airways are patent. Atelectatic changes are present.  Shrapnel fragment noted about the LEFT scapula.  This is age indeterminate.  Lungs are without interstitial disease or suspicious nodules. LYMPH NODES: Thoracic lymph nodes are not enlarged. ABDOMEN:  LIVER: No hepatomegaly.  Smooth surface contour.  Normal attenuation.  BILE DUCTS: No intrahepatic or extrahepatic biliary ductal dilatation.  GALLBLADDER: Cholelithiasis is present without evidence of acute cholecystitis.  STOMACH: No abnormalities identified.  PANCREAS: No masses or ductal dilatation.  SPLEEN: No splenomegaly or focal splenic lesion.  ADRENAL GLANDS: No thickening or nodules.  KIDNEYS AND URETERS: Kidneys are normal in size and location.  No renal or ureteral calculi.  PELVIS: RIGHT arthroplasty is present.  Artifact from the prosthesis limits assessment of the pelvis.   BLADDER: No abnormalities identified.  REPRODUCTIVE ORGANS: No abnormalities identified.  BOWEL: Colonic diverticulosis is present without evidence of acute diverticulitis.   VESSELS: No abnormalities identified.  Abdominal aorta is normal in caliber.  PERITONEUM/RETROPERITONEUM/LYMPH NODES: Shrapnel within the LEFT midabdomen also appears to be present.   No free fluid.  No pneumoperitoneum. No lymphadenopathy.  ABDOMINAL WALL: No abnormalities identified. SOFT TISSUES: No abnormalities identified.  BONES: No acute fracture or aggressive osseous lesion.  There is evidence of prior remote left-sided rib trauma.  Degenerative changes noted about the shoulders.  Shrapnel fragments about the pelvis also demonstrated.  There is fullness of the LEFT gluteus dina muscle, suggestive of intramuscular hemorrhage.  No ricky hematoma identified.  Overlying subcutaneous edema noted.  Multilevel degenerative changes are present throughout the thoracic and lumbar spine.  Dextroconvex scoliosis of the lumbar spine is present.  Mild degenerative change of the LEFT hip is present.    1. Likely intramuscular hemorrhage within the LEFT gluteus dina muscle without ricky hematoma.  Overlying subcutaneous edema noted. 2. Shrapnel fragments noted about the LEFT scapula, LEFT midabdomen and pelvis. 3. Cholelithiasis without evidence of acute cholecystitis. 4. Colonic diverticulosis is present without evidence of acute diverticulitis. 5. Cardiomegaly.  Atherosclerosis of the thoracic aorta and coronary arteries is present. Signed by Davonte Holloway II, MD    XR pelvis 1-2 views    Result Date: 6/1/2024  STUDY: Pelvis Radiographs; 6/1/2024 at 4:18 PM. INDICATION: Obvious trauma to the head and abdomen, patient cannot recall events. Evaluate for fracture. COMPARISON: None available. ACCESSION NUMBER(S): [id number] ORDERING CLINICIAN: ELIZABETH AKHTAR TECHNIQUE:  One view(s) of the pelvis. FINDINGS:  No acute fracture. Proximal plastic partially visualized. Radiodensities project over the pelvis which may relate to a prior penetrating injury.    No acute fracture. Signed by Mihir Marino MD    XR chest 1 view    Result Date: 6/1/2024  STUDY: Chest Radiograph;  6/1/2024 4:19 PM INDICATION: Trauma. COMPARISON: None Available. ACCESSION NUMBER(S): [id number] ORDERING CLINICIAN: ELIZABETH  ELEANOR AKHTAR TECHNIQUE:  Frontal chest was obtained at 16:18 hours. FINDINGS: CARDIOMEDIASTINAL SILHOUETTE: Mild cardiomegaly.  LUNGS: Lungs are clear.  ABDOMEN: No remarkable upper abdominal findings.  BONES: No acute osseous changes.    No acute cardiopulmonary process. Signed by Mihir Marino MD    CT thoracic spine wo IV contrast    Result Date: 6/1/2024  Interpreted By:  Lacey Deleon, STUDY: CT THORACIC SPINE WO IV CONTRAST; CT LUMBAR SPINE WO IV CONTRAST; 6/1/2024 4:50 pm   INDICATION: Signs/Symptoms:Obvious trauma to the head and abdomen, patient cannot recall events, on warfarin, eval for fracture.   COMPARISON: None.   ACCESSION NUMBER(S): [id number]; [id number]   ORDERING CLINICIAN: ELIZABETH AKHTAR   TECHNIQUE: Axial CT images of the thoracic and lumbar spine are obtained. Axial, coronal and sagittal reconstructions are submitted for review.   FINDINGS: Thoracic spine:   Alignment: Within normal limits.   Vertebrae/Intervertebral Discs: The thoracic vertebral body heights are intact. Moderate multilevel discogenic degenerative changes including disc space narrowing, endplate sclerosis, spurring and vacuum disc phenomena. Findings worse over the lower thoracic levels. There is no significant central canal stenosis.   Paraspinous Soft Tissues: No prevertebral soft tissue swelling   Lumbar spine:   Alignment: Moderate dextroscoliosis centered at L3. Minimal anterolisthesis of L3 on L4.   Vertebrae/Intervertebral Discs: The lumbar vertebral body heights are intact. Advanced multilevel discogenic degenerative changes including disc space narrowing, endplate sclerosis, spurring and vacuum disc phenomena. There is 3 mm anterolisthesis of L3 on L4. Advanced facet joint sclerosis and hypertrophy bilaterally at multiple levels. There is no significant central canal stenosis.   Paraspinous Soft Tissues: No prevertebral soft tissue swelling.   Total right hip arthroplasty changes.       Thoracic spine: 1. Multilevel  degenerative changes. No CT evidence for acute injury. 2. Additional detailed findings as above       Lumbar spine: 1. Dextroscoliosis and advanced multilevel degenerative changes. No CT evidence for acute injury. 2. Additional detailed findings as above           MACRO: None   Signed by: Lacey Deleon 6/1/2024 5:24 PM Dictation workstation:   [id number]    CT lumbar spine wo IV contrast    Result Date: 6/1/2024  Interpreted By:  Lacey Deleon, STUDY: CT THORACIC SPINE WO IV CONTRAST; CT LUMBAR SPINE WO IV CONTRAST; 6/1/2024 4:50 pm   INDICATION: Signs/Symptoms:Obvious trauma to the head and abdomen, patient cannot recall events, on warfarin, eval for fracture.   COMPARISON: None.   ACCESSION NUMBER(S): [id number]; [id number]   ORDERING CLINICIAN: ELIZABETH AKHTAR   TECHNIQUE: Axial CT images of the thoracic and lumbar spine are obtained. Axial, coronal and sagittal reconstructions are submitted for review.   FINDINGS: Thoracic spine:   Alignment: Within normal limits.   Vertebrae/Intervertebral Discs: The thoracic vertebral body heights are intact. Moderate multilevel discogenic degenerative changes including disc space narrowing, endplate sclerosis, spurring and vacuum disc phenomena. Findings worse over the lower thoracic levels. There is no significant central canal stenosis.   Paraspinous Soft Tissues: No prevertebral soft tissue swelling   Lumbar spine:   Alignment: Moderate dextroscoliosis centered at L3. Minimal anterolisthesis of L3 on L4.   Vertebrae/Intervertebral Discs: The lumbar vertebral body heights are intact. Advanced multilevel discogenic degenerative changes including disc space narrowing, endplate sclerosis, spurring and vacuum disc phenomena. There is 3 mm anterolisthesis of L3 on L4. Advanced facet joint sclerosis and hypertrophy bilaterally at multiple levels. There is no significant central canal stenosis.   Paraspinous Soft Tissues: No prevertebral soft tissue swelling.   Total right hip  arthroplasty changes.       Thoracic spine: 1. Multilevel degenerative changes. No CT evidence for acute injury. 2. Additional detailed findings as above       Lumbar spine: 1. Dextroscoliosis and advanced multilevel degenerative changes. No CT evidence for acute injury. 2. Additional detailed findings as above           MACRO: None   Signed by: Lacey Deleon 6/1/2024 5:24 PM Dictation workstation:   [id number]    CT cervical spine wo IV contrast    Result Date: 6/1/2024  Interpreted By:  Lacey Deleon, STUDY: CT CERVICAL SPINE WO IV CONTRAST;  6/1/2024 4:50 pm   INDICATION: Signs/Symptoms:Obvious trauma to the head and abdomen, patient cannot recall events, on warfarin, eval for fracture.   COMPARISON: None.   ACCESSION NUMBER(S): [id number]   ORDERING CLINICIAN: ELIZABETH AKHTAR   TECHNIQUE: Axial CT images of the cervical spine are obtained. Axial, coronal and sagittal reconstructions are provided for review.   FINDINGS:     Fractures: Cortical irregularity with multiple hairline lucencies involving the spinous process of the C5 vertebra suggestive of a fracture of uncertain age. No associated fluid is seen. No spinal canal stenosis.   Vertebral Alignment: 3 mm anterolisthesis of C3 on C4 and 2 mm retrolisthesis of C4 on C5. There is 3 mm anterolisthesis of C7 on T1.   Craniocervical Junction: The odontoid process and craniocervical junction are intact.   Vertebrae/Disc Spaces:  Moderate multilevel discogenic degenerative changes including disc space narrowing, endplate sclerosis, spurring and posterior disc osteophyte complex. Findings worse from C3-C7.   Prevertebral/Paraspinal Soft Tissues: No prevertebral soft tissue swelling.         1. Concern for comminuted nondisplaced fractures involving spinal process of C5 vertebra, of uncertain age. This is classified as stable fracture. Further evaluation with MRI recommended for better assessment and to assess for spinal cord injury.   2. Advanced multilevel  degenerative changes throughout the cervical spine. No additional acute fractures or subluxation.   SUPPLEMENTAL INFORMATION: Notifi message was left for EILZABETH AKHTAR regarding this exam by Dr. Deleon on 6/1/2024 at approximately 17:05 hours.   Critical Finding:  See findings. Notification was initiated on 6/1/2024 at 5:06 pm by  Lacey Deleon.  (**-OCF-**)   Signed by: Lacey Deleon 6/1/2024 5:07 PM Dictation workstation:   [id number]    CT head W O contrast trauma protocol    Result Date: 6/1/2024  Interpreted By:  Lacey Deleon, STUDY: CT HEAD W/O CONTRAST TRAUMA PROTOCOL;  6/1/2024 4:50 pm   INDICATION: Signs/Symptoms:Obvious trauma to the head on warfarin, patient cannot recall trauma or events, eval for bleed.   COMPARISON: None.   ACCESSION NUMBER(S): [id number]   ORDERING CLINICIAN: ELIZABETH AKHTAR   TECHNIQUE: Noncontrast axial CT scan of head was performed. Angled reformats in brain and bone windows were generated. The images were reviewed in bone, brain, blood and soft tissue windows.   FINDINGS: CSF Spaces: Moderate brain atrophy evidence by prominence of ventricles, sulci and cisterns. There is no extraaxial fluid collection.   Parenchyma: Confluent areas of subcortical and periventricular white matter changes which given patient's age are suggestive of chronic small vessel ischemic disease. The grey-white differentiation is intact. There is no mass effect or midline shift.  There is no intracranial hemorrhage. Hyperdensity involving the basilar artery which may represent focal wall calcification, however, thrombosis may have similar appearance.   Calvarium: The calvarium is unremarkable.   Paranasal sinuses and mastoids: Visualized paranasal sinuses and mastoids are clear.       1. Atherosclerotic wall calcification versus thrombus involving the basilar artery. Correlation with MRI/MRA recommended for better assessment.   2. Brain atrophy. No evidence of acute cortical infarct or intracranial  hemorrhage.   MACRO: Critical Finding:  See findings. Notification was initiated on 6/1/2024 at 5:02 pm by  Lacey Deleon.  (**-YCF-**)     Signed by: Lacey Deleon 6/1/2024 5:02 PM Dictation workstation:   [id number]    Echocardiogram stress test    Result Date: 5/8/2024  Nadja Williamson DO     5/8/2024  3:51 PM Limited Cardiac Ultrasound for Cardiac Pathology US CARDIAC (POC) ED USE ONLY  (OH, NO AC) Date/Time: 5/8/2024 1:51 PM Performed by: Nadja Williamson DO Authorized by: Nadja Williamson DO   Indication: Patient presents with chest pain, SOB, and/or concern for shock. Procedure in detail: Using the phased array probe, the heart was imaged in a apical, subcostal and parasternal view. Pericardial Effusion was absent. Cardiac activity was detected. Left Ventricular Function was severely impaired. Right Ventricular Size was Normal. Yes still images or video images were saved for this exam.. Conclusion: Pericardial effusion was absent. Other Pertinent Findings Included: Decreased LVEF. This limited imaging study was performed by: Attending only.    XR chest 1 view    Result Date: 5/8/2024  * * *Final Report* * * DATE OF EXAM: May  8 2024 12:59PM   MMX   [id number]  -  XR CHEST 1V FRONTAL PORT  / ACCESSION #  [id number] PROCEDURE REASON: Shortness of breath      * * * * Physician Interpretation * * * *  EXAMINATION:  CHEST RADIOGRAPH (PORTABLE SINGLE VIEW AP) Exam Date/Time:  5/8/2024 12:59 PM CLINICAL HISTORY: Shortness of breath MQ:  [id number] Comparison:  11/20/2012 RESULT: Lines, tubes, and devices:  None. Lungs and pleura:  No alveolar airspace opacity.  No pneumothorax.   Stable mild curvilinear fibrosis in the lingula.  Costophrenic angles are clear. Cardiomediastinal silhouette:  Enlarged cardiomediastinal silhouette. Other: Thoracic spine degenerative changes.  3 mm rounded metallic radiodensity again projects of the right cardiophrenic region.    IMPRESSION: Cardiomegaly.  No radiographic evidence for acute  cardiopulmonary disease.  Assessment/Plan:  Patient has several medical comorbidities and cardiac issues.  It appears that he has had some trauma recently.   I do not see any focal neurologic deficit on exam however due to his CT head showing a questionable basilar artery thrombosis I do recommend CTA head and neck  ( not ordered, will need to wait for kidneys to improve after contrast yesterday) and MRI brain.  If kidneys are not improving will then do MRA head and neck tomorrow. If thrombosis is confirmed then patient would benefit from anticoagulation ? Held due to his buttocks hemorrhage.     Thank you for this consultation.  Please call neurologist on-call for any questions or concerns.

## 2024-06-02 NOTE — CARE PLAN
Problem: Pain  Goal: My pain/discomfort is manageable  Outcome: Progressing     Problem: Safety  Goal: Patient will be injury free during hospitalization  Outcome: Progressing  Goal: I will remain free of falls  Outcome: Progressing     Problem: Daily Care  Goal: Daily care needs are met  Outcome: Progressing     Problem: Psychosocial Needs  Goal: Demonstrates ability to cope with hospitalization/illness  Outcome: Progressing  Goal: Collaborate with me, my family, and caregiver to identify my specific goals  Outcome: Progressing  Flowsheets (Taken 6/1/2024 2346)  Cultural Requests During Hospitalization: none  Spiritual Requests During Hospitalization: none     Problem: Discharge Barriers  Goal: My discharge needs are met  Outcome: Progressing     Problem: Skin  Goal: Prevent/manage excess moisture  Outcome: Progressing  Flowsheets (Taken 6/2/2024 0004)  Prevent/manage excess moisture: Cleanse incontinence/protect with barrier cream  Goal: Prevent/minimize sheer/friction injuries  Outcome: Progressing  Flowsheets (Taken 6/2/2024 0004)  Prevent/minimize sheer/friction injuries: Use pull sheet  Goal: Promote skin healing  Outcome: Progressing  Flowsheets (Taken 6/2/2024 0004)  Promote skin healing: Assess skin/pad under line(s)/device(s)     Problem: Fall/Injury  Goal: Not fall by end of shift  Outcome: Progressing  Goal: Be free from injury by end of the shift  Outcome: Progressing  Goal: Verbalize understanding of personal risk factors for fall in the hospital  Outcome: Progressing  Goal: Verbalize understanding of risk factor reduction measures to prevent injury from fall in the home  Outcome: Progressing  Goal: Use assistive devices by end of the shift  Outcome: Progressing  Goal: Pace activities to prevent fatigue by end of the shift  Outcome: Progressing   The patient's goals for the shift include safety    The clinical goals for the shift include safety    Over the shift, the patient did not make progress  toward the following goals. Barriers to progression include . Recommendations to address these barriers include .

## 2024-06-02 NOTE — CARE PLAN
Problem: Pain  Goal: My pain/discomfort is manageable  Outcome: Progressing     Problem: Safety  Goal: Patient will be injury free during hospitalization  Outcome: Progressing  Goal: I will remain free of falls  Outcome: Progressing     Problem: Daily Care  Goal: Daily care needs are met  Outcome: Progressing     Problem: Psychosocial Needs  Goal: Demonstrates ability to cope with hospitalization/illness  Outcome: Progressing  Goal: Collaborate with me, my family, and caregiver to identify my specific goals  Outcome: Progressing     Problem: Discharge Barriers  Goal: My discharge needs are met  Outcome: Progressing     Problem: Skin  Goal: Prevent/manage excess moisture  Outcome: Progressing  Goal: Prevent/minimize sheer/friction injuries  Outcome: Progressing  Goal: Promote skin healing  Outcome: Progressing     Problem: Fall/Injury  Goal: Not fall by end of shift  Outcome: Progressing  Goal: Be free from injury by end of the shift  Outcome: Progressing  Goal: Verbalize understanding of personal risk factors for fall in the hospital  Outcome: Progressing  Goal: Verbalize understanding of risk factor reduction measures to prevent injury from fall in the home  Outcome: Progressing  Goal: Use assistive devices by end of the shift  Outcome: Progressing  Goal: Pace activities to prevent fatigue by end of the shift  Outcome: Progressing   The patient's goals for the shift include safety    The clinical goals for the shift include pt to remain hemodynamically stable throughout duation of shift

## 2024-06-02 NOTE — CARE PLAN
Problem: Psychosocial Needs  Goal: Collaborate with me, my family, and caregiver to identify my specific goals  Recent Flowsheet Documentation  Taken 6/1/2024 7636 by Vy Garcia RN  Cultural Requests During Hospitalization: none  Spiritual Requests During Hospitalization: none   The patient's goals for the shift include safety    The clinical goals for the shift include safety    Over the shift, the patient did not make progress toward the following goals. Barriers to progression include . Recommendations to address these barriers include

## 2024-06-02 NOTE — CONSULTS
"Reason For Consult  Comminuted C5 spinous process and laminar fracture age-indeterminate    History Of Present Illness  Cheko Jin is a 78 y.o. male presenting with an inability to give me details about what happened directly so I have included the hospitalist's history.    \"Cheko Jin is a 78 y.o. male who presents to the hospital with complaints of trauma.  Patient has physical signs symptoms of trauma but cannot tell us what happened.  He was originally sent to an urgent care by his, I believe, significant other and then sent to the emergency department for further evaluation.  Patient is quite confused.  Most of this history is obtained by chart review.  Patient was admitted to a [facility] facility with atrial flutter with RVR.  He underwent GLEN guided cardioversion on 5/8/2024 which successfully converted into normal sinus rhythm.  His ejection fraction was noted to be extremely reduced at about 10%.  He was also noted to have some nonsustained runs of V. tach.  He underwent left heart cath without significant disease.  Cardiology optimized his medications and patient was discharged to a skilled nursing facility with a LifeVest and a Lovenox bridge to Coumadin due to inability to afford a DOAC. He went to a [facility] SNF for a few days and was discharged home.  Seems like he was not taught how to inject the Lovenox for bridging and did not know how to wear the LifeVest.  He arrived unannounced at his PCPs office in the left due to wait time.  [facility] was contacted as were his emergency contacts.  In subsequent visit his PCP has filed for guardianship and filled out a statement of expert evaluation for [facility] declining patient to lack capacity.     Emergency department workup showed a left gluteal intramuscular hemorrhage.  An age-indeterminate comminuted nondisplaced fracture involving the spinal process of C5.  The emergency department did speak to one of the patient's surrogate decision makers who is in " "[facility] [place].  I am told they are on the way have expressed their concerns over his independence. \"    I spoke directly with the emergency room doctor Dr. Shrestha last evening around midnight and he reiterated much of the history above.  When I talked to the patient he denies any headache, no neck pain no arm pain and no leg pain.  He did not know how he got here and he denies vehemently any trauma or falls.         Past Medical History  He has a past medical history of Alzheimer's dementia (Multi), Atrial flutter (Multi), CAD (coronary artery disease), CKD (chronic kidney disease), Dilated cardiomyopathy (Multi), Gout, HFrEF (heart failure with reduced ejection fraction) (Multi), Hypertension, Malignant neoplasm of bladder, unspecified (Multi), NSVT (nonsustained ventricular tachycardia) (Multi), Sleep apnea, and Stroke (cerebrum) (Multi).    Surgical History  He has a past surgical history that includes Hernia repair (Bilateral) and Transurethral resection of prostate.     Social History  HeAlcohol use questions deferred to the physician. Drug use questions deferred to the physician. No history on file for tobacco use.    Family History  Family History   Problem Relation Name Age of Onset    No Known Problems Mother      No Known Problems Father          Allergies  Cetirizine, Isosorbide, Levofloxacin, Lisinopril, Loratadine, and Statins-hmg-coa reductase inhibitors    Review of Systems  Patient denies all symptoms     Physical Exam  General: NAD, AOx 2 but did not seem to know what year it was moments later I could see he was reading off the board behind me,  no aphasia or dysarthria, not able to answer some simple questions  Cranial Nerves II-XII: VFF, PERRL, EOMI, Face Symm, Facial SILT, Palate/Tongue midline and symmetric  Motor: 5/5 Throughout all extremities,  No drift, no dysmetria on finger to nose  Sensation: SILT and PP throughout all extremities  DTRS: 2+ Throughout,      Last Recorded Vitals  Blood " "pressure 118/64, pulse 58, temperature 36.4 °C (97.5 °F), temperature source Temporal, resp. rate 18, height 1.753 m (5' 9\"), weight 79.4 kg (175 lb), SpO2 91%.    Relevant Results  The CAT scan of his cervical spine shows significant spondylosis but most impressively is the comminuted fracture of the lamina and spinous process of C5.  The CAT scan of his brain does not show any obvious infarct but the radiologist was recommending MRA and MRI to rule out some vascular insufficiency.  And then he also has some significant spondylosis throughout the rest of his spine and some scoliosis     Assessment/Plan     Is a 78-year-old gentleman who seems to have some cognitive impairment who clearly has a laceration on his forehead and fractures in his cervical spine but who may also have some vascular insufficiency on his brain scan.  I will leave it up to neurology as to whether or not the order all of the MRIs, which I see they have actually already ordered.  I would like to go ahead and get an MRI of the cervical spine to help determine if this was an acute injury or not.  Regardless it is not unstable and he is not tender but understanding when it occurred could be helpful.  Will continue to follow until we understand the sequence of events.    I spent 45 minutes in the professional and overall care of this patient.      Davonte Singh MD    "
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
## 2024-06-03 LAB
ANION GAP SERPL CALC-SCNC: 12 MMOL/L (ref 10–20)
BACTERIA UR CULT: NORMAL
BUN SERPL-MCNC: 65 MG/DL (ref 6–23)
CALCIUM SERPL-MCNC: 8.5 MG/DL (ref 8.6–10.3)
CHLORIDE SERPL-SCNC: 102 MMOL/L (ref 98–107)
CO2 SERPL-SCNC: 27 MMOL/L (ref 21–32)
CREAT SERPL-MCNC: 2.43 MG/DL (ref 0.5–1.3)
EGFRCR SERPLBLD CKD-EPI 2021: 27 ML/MIN/1.73M*2
GLUCOSE SERPL-MCNC: 101 MG/DL (ref 74–99)
INR PPP: 1.3 (ref 0.9–1.1)
POTASSIUM SERPL-SCNC: 3.6 MMOL/L (ref 3.5–5.3)
PROTHROMBIN TIME: 15 SECONDS (ref 9.8–12.8)
SODIUM SERPL-SCNC: 137 MMOL/L (ref 136–145)

## 2024-06-03 PROCEDURE — 2500000001 HC RX 250 WO HCPCS SELF ADMINISTERED DRUGS (ALT 637 FOR MEDICARE OP): Performed by: INTERNAL MEDICINE

## 2024-06-03 PROCEDURE — 2500000001 HC RX 250 WO HCPCS SELF ADMINISTERED DRUGS (ALT 637 FOR MEDICARE OP): Performed by: NURSE PRACTITIONER

## 2024-06-03 PROCEDURE — 97161 PT EVAL LOW COMPLEX 20 MIN: CPT | Mod: GP

## 2024-06-03 PROCEDURE — 85610 PROTHROMBIN TIME: CPT | Performed by: NURSE PRACTITIONER

## 2024-06-03 PROCEDURE — 2500000004 HC RX 250 GENERAL PHARMACY W/ HCPCS (ALT 636 FOR OP/ED): Performed by: NURSE PRACTITIONER

## 2024-06-03 PROCEDURE — 97165 OT EVAL LOW COMPLEX 30 MIN: CPT | Mod: GO

## 2024-06-03 PROCEDURE — 2500000002 HC RX 250 W HCPCS SELF ADMINISTERED DRUGS (ALT 637 FOR MEDICARE OP, ALT 636 FOR OP/ED): Performed by: INTERNAL MEDICINE

## 2024-06-03 PROCEDURE — 99232 SBSQ HOSP IP/OBS MODERATE 35: CPT | Performed by: INTERNAL MEDICINE

## 2024-06-03 PROCEDURE — 80051 ELECTROLYTE PANEL: CPT | Performed by: NURSE PRACTITIONER

## 2024-06-03 PROCEDURE — 99231 SBSQ HOSP IP/OBS SF/LOW 25: CPT | Performed by: SURGERY

## 2024-06-03 PROCEDURE — 1200000002 HC GENERAL ROOM WITH TELEMETRY DAILY

## 2024-06-03 PROCEDURE — 36415 COLL VENOUS BLD VENIPUNCTURE: CPT | Performed by: NURSE PRACTITIONER

## 2024-06-03 RX ORDER — HEPARIN SODIUM 5000 [USP'U]/ML
5000 INJECTION, SOLUTION INTRAVENOUS; SUBCUTANEOUS EVERY 8 HOURS
Status: DISCONTINUED | OUTPATIENT
Start: 2024-06-03 | End: 2024-06-04

## 2024-06-03 RX ADMIN — ALLOPURINOL 150 MG: 300 TABLET ORAL at 09:49

## 2024-06-03 RX ADMIN — CHOLECALCIFEROL TAB 25 MCG (1000 UNIT) 2000 UNITS: 25 TAB at 09:00

## 2024-06-03 RX ADMIN — DOCUSATE SODIUM 100 MG: 100 CAPSULE, LIQUID FILLED ORAL at 09:49

## 2024-06-03 RX ADMIN — FINASTERIDE 5 MG: 5 TABLET, FILM COATED ORAL at 09:49

## 2024-06-03 RX ADMIN — TAMSULOSIN HYDROCHLORIDE 0.4 MG: 0.4 CAPSULE ORAL at 09:49

## 2024-06-03 RX ADMIN — HYDRALAZINE HYDROCHLORIDE 25 MG: 25 TABLET, FILM COATED ORAL at 19:50

## 2024-06-03 RX ADMIN — METOPROLOL SUCCINATE 50 MG: 50 TABLET, EXTENDED RELEASE ORAL at 09:49

## 2024-06-03 RX ADMIN — ATORVASTATIN CALCIUM 10 MG: 10 TABLET, FILM COATED ORAL at 09:49

## 2024-06-03 RX ADMIN — AMIODARONE HYDROCHLORIDE 200 MG: 200 TABLET ORAL at 09:49

## 2024-06-03 RX ADMIN — HYDRALAZINE HYDROCHLORIDE 25 MG: 25 TABLET, FILM COATED ORAL at 09:49

## 2024-06-03 RX ADMIN — HEPARIN SODIUM 5000 UNITS: 5000 INJECTION INTRAVENOUS; SUBCUTANEOUS at 19:50

## 2024-06-03 RX ADMIN — DAPAGLIFLOZIN 5 MG: 5 TABLET, FILM COATED ORAL at 09:49

## 2024-06-03 RX ADMIN — ISOSORBIDE DINITRATE 20 MG: 10 TABLET ORAL at 09:49

## 2024-06-03 ASSESSMENT — COGNITIVE AND FUNCTIONAL STATUS - GENERAL
MOVING FROM LYING ON BACK TO SITTING ON SIDE OF FLAT BED WITH BEDRAILS: A LITTLE
TURNING FROM BACK TO SIDE WHILE IN FLAT BAD: A LITTLE
DRESSING REGULAR UPPER BODY CLOTHING: A LITTLE
DRESSING REGULAR UPPER BODY CLOTHING: A LITTLE
CLIMB 3 TO 5 STEPS WITH RAILING: A LITTLE
DAILY ACTIVITIY SCORE: 18
MOVING TO AND FROM BED TO CHAIR: A LITTLE
STANDING UP FROM CHAIR USING ARMS: A LITTLE
DRESSING REGULAR LOWER BODY CLOTHING: A LITTLE
DRESSING REGULAR UPPER BODY CLOTHING: A LITTLE
MOBILITY SCORE: 18
MOVING FROM LYING ON BACK TO SITTING ON SIDE OF FLAT BED WITH BEDRAILS: A LITTLE
PERSONAL GROOMING: A LITTLE
WALKING IN HOSPITAL ROOM: A LITTLE
PERSONAL GROOMING: A LITTLE
CLIMB 3 TO 5 STEPS WITH RAILING: A LOT
TOILETING: A LOT
MOBILITY SCORE: 17
WALKING IN HOSPITAL ROOM: A LITTLE
WALKING IN HOSPITAL ROOM: A LITTLE
DRESSING REGULAR LOWER BODY CLOTHING: A LOT
STANDING UP FROM CHAIR USING ARMS: A LITTLE
PERSONAL GROOMING: A LITTLE
HELP NEEDED FOR BATHING: A LITTLE
TOILETING: A LITTLE
HELP NEEDED FOR BATHING: A LITTLE
EATING MEALS: A LITTLE
CLIMB 3 TO 5 STEPS WITH RAILING: A LITTLE
EATING MEALS: A LITTLE
TOILETING: A LITTLE
DAILY ACTIVITIY SCORE: 18
TURNING FROM BACK TO SIDE WHILE IN FLAT BAD: A LITTLE
STANDING UP FROM CHAIR USING ARMS: A LITTLE
DRESSING REGULAR LOWER BODY CLOTHING: A LITTLE
HELP NEEDED FOR BATHING: A LOT
MOBILITY SCORE: 19
MOVING TO AND FROM BED TO CHAIR: A LITTLE
DAILY ACTIVITIY SCORE: 16
TURNING FROM BACK TO SIDE WHILE IN FLAT BAD: A LITTLE
MOVING TO AND FROM BED TO CHAIR: A LITTLE

## 2024-06-03 ASSESSMENT — PAIN - FUNCTIONAL ASSESSMENT
PAIN_FUNCTIONAL_ASSESSMENT: 0-10
PAIN_FUNCTIONAL_ASSESSMENT: 0-10

## 2024-06-03 ASSESSMENT — PAIN SCALES - GENERAL
PAINLEVEL_OUTOF10: 0 - NO PAIN

## 2024-06-03 NOTE — PROGRESS NOTES
Occupational Therapy    Evaluation    Patient Name: Cheko Jin  MRN: [id number]  Today's Date: 6/3/2024  Time Calculation  Start Time: 1108  Stop Time: 1130  Time Calculation (min): 22 min    Assessment  IP OT Assessment  OT Assessment: Pt is a 78 year old male demonstrating a decline in adl/funcitonal mobility after experienicing secondary trauma, scalp wound present.Pt can benefit from OT tx intervention  End of Session Communication: Bedside nurse  End of Session Patient Position: Bed, 2 rail up, Alarm on  Plan:  Treatment Interventions: ADL retraining, Functional transfer training  OT Frequency: 3 times per week  OT Discharge Recommendations: Moderate intensity level of continued care  OT - OK to Discharge:  (okay to discharge to next level of care pending medical team approval)    Subjective   Current Problem:  1. Hyponatremia        2. Elevated INR        3. Abrasion of scalp, initial encounter        4. Urinary tract infection without hematuria, site unspecified          General:  General  Reason for Referral: impaired functional daily living skills; OT eval and treat  Referred By: Anahi  Past Medical History Relevant to Rehab: cardiomyopathy, atrial flutter, cad, advanced dementia, ckd iii, htn, nahomy/cpap, bladder ca, cva, left knee arthroscopy, leonidas hernia repairs, TURBT  Co-Treatment: PT  Co-Treatment Reason: to maximize pt. safety and performance  Prior to Session Communication: Bedside nurse  Patient Position Received: Bed, 2 rail up, Alarm on  General Comment: Pt. is 79 y/o male adm. due to trauma; pt.unaware of what happened; confused.  ER w/u revealed left gluteal intramuscular hemorrhage and age-indeterminate nondisplaced spinal process fx C5.  Pt.recently at [facility] with atrial flutter with rvr; d/c to snf then home with LifeVest; APS contacted by PCP for ?guardianship.   DX: hyponatremia; elev.INR; abrasion of scalp; uti.  Precautions:  Medical Precautions: Fall precautions, Cardiac precautions  (LifeVest)  Precautions Comment: heart monitor ivone -cardiac precautions (C5 spinal process fx; no restrictions in chart; no brace on pt.)       Pain:   0/10 pain  0/10 numbness/tingling    Objective   Cognition:  Orientation Level:  (O x 2, cues; slowed processing)           Home Living:  Type of Home: Apartment  Lives With: Alone  Home Layout: Bed/bath upstairs (?second floor apt. (may be in a house))  Home Access: Stairs to enter with rails (12 steps to 2nd floor)  Home Living Comments: Pt demonstrates confusion over living situation, but then corrects self after additional questioning. Pt lives on a 2nd floor apartment ( (stairs only 8-12) . Pt has a walk in shower without chair/rails. Pt  has a standard bed. Pt lives alone, but states that jhis friend can help.   Prior Function:  Level of [place]: Independent with ADLs and functional transfers, Independent with homemaking with ambulation  Ambulatory Assistance: Independent (no ad)  Prior Function Comments: Pt. reports he is indep.with ambulation, adls and iadls.  States he drives and does own chores including laundry in the basement.  Has a friend to assist prn.Pt sleeps in a standard bed  IADL History:  IADL Comments: pt reports that he had been indep PTA with adl/ home mgnt  ADL:     Activity Tolerance:  Endurance: Decreased tolerance for upright activites  Bed Mobility/Transfers: Bed Mobility  Bed Mobility:  (sup<>sit with supervision)    Transfers  Transfer:  (sit<>stand from eob with SBA)      Functional Mobility:requires cg without device for functional mobility, mild lob noted          IADL's:   IADL Comments: pt reports that he had been indep PTA with adl/ home mgnt  Vision:    Sensation:  Light Touch: No apparent deficits  Strength:  Strength Comments: strength below elbows wfl  Perception:wfl,      Coordination:wfl      Hand Function:right     Extremities: RUE   SP :  (100 deg shoulder flex/below elbow wfl) and LYNNE BATISTA:  (shoulder flex 110  deg/below elbow wfl)    Outcome Measures: [facility] Daily Activity  Putting on and taking off regular lower body clothing: A lot  Bathing (including washing, rinsing, drying): A lot  Putting on and taking off regular upper body clothing: A little  Toileting, which includes using toilet, bedpan or urinal: A lot  Taking care of personal grooming such as brushing teeth: A little (sink level)  Eating Meals: None  Daily Activity - Total Score: 16      Education Documentation  Mobility Training, taught by Leona Cerna OT at 6/3/2024  5:44 PM.  Learner: Patient  Readiness: Acceptance  Method: Demonstration  Response: Demonstrated Understanding, Needs Reinforcement    Mobility Training, taught by Leona Cerna OT at 6/3/2024  5:43 PM.  Learner: Patient  Readiness: Acceptance  Method: Demonstration  Response: Demonstrated Understanding, Needs Reinforcement    Mobility Training, taught by Leona Cerna OT at 6/3/2024  5:39 PM.  Learner: Patient  Readiness: Acceptance  Method: Demonstration  Response: Demonstrated Understanding, Needs Reinforcement    Education Comments  No comments found.      Goals:   Encounter Problems       Encounter Problems (Active)       impaired functional daily living skills       Pt will increase Grooming to s/mod indep (sink level)   Upper Body Bathing to s/mod indep     Lower Body Bathing  to s/mod indep    Increase Upper Body Dressing  to s/mod indep     LE Dressing to s/mod indep with/ without adaptive equipment as needed (Progressing)       Start:  06/03/24    Expected End:  06/17/24            Pt will increase Functional Transfers Bed Mobility to mod indep/indep    Sit to Stand  to mod indep/indep   Functional Mobility  with /without a device to s/mod indep/indep  chair/toliet/room to increase indep/safety in patients discharge environment (Progressing)       Start:  06/03/24    Expected End:  06/17/24            Pt will increase  energy conservation /work  simplification/diaphragmatic breathing /cardiac/back precautions in activities of adl/ home mgnt to s/indep assistance to increase independence and safety  within  the patient's discharge environment  (Progressing)       Start:  06/03/24    Expected End:  06/17/24

## 2024-06-03 NOTE — CONSULTS
"Nutrition Initial Assessment:   Nutrition Assessment    Reason for Assessment: Admission nursing screening (MST=2 for unsure of weight loss)    Patient is a 78 y.o. male presenting with hyponatremia, scalp abrasion s/p fall     PmHx: UTI, HTN, CAD, CKD-3, GOMEZ, aflutter, advanced dementia, hx bladder cancer, hx CVA    Nutrition History:  Energy Intake:  (not established yet, however pt reports good appetite)  Food and Nutrient History: Pt laying in bed at  time of visit today. Pt reports good appetite, denies chewing or swallowing problems. Pt denies any unintentional weight loss  Vitamin/Herbal Supplement Use: vitamin D3  Food Allergies/Intolerances:  None  GI Symptoms: None  Oral Problems:  WEI       Anthropometrics:  Height: 175.3 cm (5' 9.02\")   Weight: 79.4 kg (175 lb 0.7 oz)   BMI (Calculated): 25.84  IBW/kg (Dietitian Calculated): 72.7 kg  Percent of IBW: 109 %       Weight History:     Weight Change %:  Weight History / % Weight Change: no weight hx found in EMR archives    Nutrition Focused Physical Exam Findings:  defer: not appropriate   Subcutaneous Fat Loss:   Orbital Fat Pads: Defer  Muscle Wasting:     Edema:  Edema: none  Physical Findings:  Skin: Positive (abrasion on top of head)    Nutrition Significant Labs:  CBC Trend:   Results from last 7 days   Lab Units 06/02/24  0517 06/01/24  1703   WBC AUTO x10*3/uL 6.3 8.5   RBC AUTO x10*6/uL 4.19* 4.18*   HEMOGLOBIN g/dL 13.1* 13.2*   HEMATOCRIT % 38.4* 39.1*   MCV fL 92 94   PLATELETS AUTO x10*3/uL 131* 150    , BMP Trend:   Results from last 7 days   Lab Units 06/03/24  0519 06/02/24  0517 06/01/24  1703   GLUCOSE mg/dL 101* 104* 115*   CALCIUM mg/dL 8.5* 8.5* 8.3*   SODIUM mmol/L 137 136 131*   POTASSIUM mmol/L 3.6 3.7 3.9   CO2 mmol/L 27 25 25   CHLORIDE mmol/L 102 101 99   BUN mg/dL 65* 63* 64*   CREATININE mg/dL 2.43* 2.73* 2.70*    , BG POCT trend:        Nutrition Specific Medications:  Reviewed     I/O:    ;      Dietary Orders (From admission, " onward)       Start     Ordered    06/03/24 1331  Oral nutritional supplements  Until discontinued        Question Answer Comment   Deliver with Dinner    Select supplement: Mighty Shake        06/03/24 1330    06/01/24 2201  Adult diet Regular  Diet effective now        Question:  Diet type  Answer:  Regular    06/01/24 2200                     Estimated Needs:      Method for Estimating Needs: 1600-1750kcals (20-22kcals/kg ABW)     Method for Estimating Needs: 63-79g (0.8-1.0g/kg ABW) as renal function permits     Method for Estimating Needs: 1 mL/kcal or as per MD        Nutrition Diagnosis   Malnutrition Diagnosis  Patient has Malnutrition Diagnosis: No    Nutrition Diagnosis  Patient has Nutrition Diagnosis: Yes  Diagnosis Status (1): New  Nutrition Diagnosis 1: Increased nutrient needs  Related to (1): physiological causes increasing nutrient needs  As Evidenced by (1): scalp abrasion, UTI       Nutrition Interventions/Recommendations         Nutrition Prescription:  Individualized Nutrition Prescription Provided for : Regular diet as ordered. will order Mighty shake once daily        Nutrition Interventions:   Interventions: Meals and snacks, Medical food supplement  Meals and Snacks: General healthful diet  Goal: consume >75% of meals  Medical Food Supplement: Commercial beverage  Goal: consume >75% of Mighty shake (for an additional 220 kcals, 6 gm protein each)    Collaboration and Referral of Nutrition Care:  (spoke with patient)    Nutrition Education:   Not appropriate        Nutrition Monitoring and Evaluation   Food/Nutrient Related History Monitoring  Monitoring and Evaluation Plan: Energy intake, Fluid intake, Amount of food  Energy Intake: Estimated energy intake  Criteria: Meal/ONS intake to meet >75% of estimated needs  Fluid Intake: Estimated fluid intake  Criteria: fluid intake to meet >75% of estimated need  Amount of Food: Estimated amout of food, Medical food intake  Criteria: Pt to consume  >75% of meals/ONS    Body Composition/Growth/Weight History  Monitoring and Evaluation Plan: Weight  Weight: Measured weight  Criteria: reweigh at least every 5 days    Biochemical Data, Medical Tests and Procedures  Monitoring and Evaluation Plan: Electrolyte/renal panel  Criteria: labs WNL    Nutrition Focused Physical Findings  Monitoring and Evaluation Plan: Skin  Criteria: promote healing and maintenance of skin integrity       Time Spent/Follow-up Reminder:   Time Spent (min): 45 minutes  Last Date of Nutrition Visit: 06/03/24  Nutrition Follow-Up Needed?: 3-5 days  Follow up Comment: 6/7 TG

## 2024-06-03 NOTE — CARE PLAN
The patient's goals for the shift include safety    The clinical goals for the shift include Rest taken
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
## 2024-06-03 NOTE — PROGRESS NOTES
Subjective  Pt more somber today but engaging. He denies pain and SOB.     Objective  I personally reviewed all pertinent labwork, imaging and vital signs, as well as nursing, therapy, discharge planning and consult notes.     Vitals:    06/03/24 0754   BP: 100/56   Pulse:    Resp: 18   Temp: 36.9 °C (98.4 °F)   SpO2:        Vitals:    06/01/24 1613   Weight: 79.4 kg (175 lb)       Scheduled medications  allopurinol, 150 mg, oral, Daily  amiodarone, 200 mg, oral, Daily  atorvastatin, 10 mg, oral, Daily  cholecalciferol, 2,000 Units, oral, Daily  dapagliflozin propanediol, 5 mg, oral, Daily  docusate sodium, 100 mg, oral, BID  finasteride, 5 mg, oral, Daily  [Held by provider] furosemide, 40 mg, oral, Daily  heparin (porcine), 5,000 Units, subcutaneous, q8h  hydrALAZINE, 25 mg, oral, BID  isosorbide dinitrate, 20 mg, oral, Daily  metoprolol succinate XL, 50 mg, oral, Daily  tamsulosin, 0.4 mg, oral, Daily      Continuous medications     PRN medications  PRN medications: acetaminophen **OR** [DISCONTINUED] acetaminophen **OR** [DISCONTINUED] acetaminophen, melatonin    Results for orders placed or performed during the hospital encounter of 06/01/24 (from the past 24 hour(s))   Protime-INR   Result Value Ref Range    Protime 15.0 (H) 9.8 - 12.8 seconds    INR 1.3 (H) 0.9 - 1.1   Basic Metabolic Panel   Result Value Ref Range    Glucose 101 (H) 74 - 99 mg/dL    Sodium 137 136 - 145 mmol/L    Potassium 3.6 3.5 - 5.3 mmol/L    Chloride 102 98 - 107 mmol/L    Bicarbonate 27 21 - 32 mmol/L    Anion Gap 12 10 - 20 mmol/L    Urea Nitrogen 65 (H) 6 - 23 mg/dL    Creatinine 2.43 (H) 0.50 - 1.30 mg/dL    eGFR 27 (L) >60 mL/min/1.73m*2    Calcium 8.5 (L) 8.6 - 10.3 mg/dL       Constitutional: Well developed, awake, alert, calm, oriented x3, no acute distress, cooperative   Eyes: EOMI, clear sclera   ENMT: mucous membranes moist, no lesions seen   Head/Neck: Neck supple, no apparent injury, laceration and excoriation noted to top  of head with scant serous discharge   Respiratory/Thorax: CTAB, good chest expansion, respirations even and unlabored   Cardiovascular: Regular rate and rhythm, no murmurs/rubs/gallops, normal S1 and S 2, Lifevest in place   Gastrointestinal: Abdomen nondistended, soft, nontender, +BS, no bruits   Musculoskeletal: ROM intact, no joint swelling, normal  strength   Extremities: no cyanosis, edema, contusions or clubbing   Neurological: no focal deficit, pt alert and oriented x3   Psychological: Appropriate affect and behavior, pleasant   Skin: Warm and dry, no lesions, no rashes       Past Medical History:   Diagnosis Date    Alzheimer's dementia (Multi)     Atrial flutter (Multi)     CAD (coronary artery disease)     nonobstructive    CKD (chronic kidney disease)     Dilated cardiomyopathy (Multi)     Gout     HFrEF (heart failure with reduced ejection fraction) (Multi)     Hypertension     Malignant neoplasm of bladder, unspecified (Multi)     NSVT (nonsustained ventricular tachycardia) (Multi)     Lifevest placed    Sleep apnea     Stroke (cerebrum) (Multi)     3 strokes R frontal, parietal lobes (July, 2010, September, 2010 and October, 2010), etiology embolic due to 40-50% ulcerated R ICA plaque s/p R CEA 10/12/10        Assessment/Plan  Trauma/mechanical fall  Left gluteus dina intramuscular hemorrhage     - per radiologist there is no hematoma formation yet, warfarin was reversed and dose is held for now     - INR subtherapeutic today, per trauma service recs will start subQ heparin (starting tonight), awaiting MRI testing as below     - will place wound care orders for head injury  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     - neurology on board, MR imaging ordered  Age-indeterminate C5 spinous process fracture     - neurosurgery not recommending invasive eval for now, MR c-spine ordered  A flutter on warfarin  Supratherapeutic INR     - s/p cardioversion 5/8/24 at [facility], currently maintaining NSR in  the 50s   HFrEF/dilated CM     -recent ejection fraction noted to be extremely reduced at about 10%, pt had nonsustained runs of V-tach     - LifeVest placed earlier this month during admission at [facility], continue GDMT  Bacteremia     - DC ceftriaxone as urine cx is negative  Advanced Alzheimers dementia      - pt's PCP has filed expert evaluation/guardianship paperwork with [employer], social work consulted to continue this process      - pt has surrogate decision maker who is coming in from [facility]  STUART on CKD III     - baseline Scr 1.4-1.8, hold Lasix for now, Scr slowly downrtrending  Hx recurrent embolic strokes in 2010     - pt on warfarin as above  Nonobstructive CAD     - no significant disease by recent left heart cath at [facility], continue BB and statin   Hypertension     - BP a little low, continue BP meds with parameters  DVT ppx     - warfarin held, subQ heparin as above  Discharge disposition     - pending PT/OT sheryl Christian, CNP  Hospital Medicine

## 2024-06-03 NOTE — PROGRESS NOTES
Physical Therapy    Physical Therapy Evaluation    Patient Name: Cheko Jin  MRN: [id number]  Today's Date: 6/3/2024   Time Calculation  Start Time: 1109  Stop Time: 1129  Time Calculation (min): 20 min    Assessment/Plan   PT Assessment  PT Assessment Results: Decreased strength, Decreased endurance, Impaired balance, Decreased mobility, Decreased cognition, Impaired judgement, Decreased safety awareness  Rehab Prognosis: Good  Evaluation/Treatment Tolerance: Patient limited by fatigue  End of Session Communication: Bedside nurse  End of Session Patient Position: Bed, 2 rail up, Alarm on  IP OR SWING BED PT PLAN  Inpatient or Swing Bed: Inpatient  PT Plan  Treatment/Interventions: Bed mobility, Transfer training, Gait training, Stair training, Balance training, Strengthening, Endurance training, Therapeutic exercise, Therapeutic activity  PT Plan: Skilled PT  PT Frequency: 5 times per week  PT Discharge Recommendations: Moderate intensity level of continued care      Subjective   General Visit Information:  General  Reason for Referral: impaired functional mobility; PT eval and treat  Referred By: Anahi  Past Medical History Relevant to Rehab: cardiomyopathy, atrial flutter, cad, advanced dementia, ckd iii, htn, nahomy/cpap, bladder ca, cva, left knee arthroscopy, leonidas hernia repairs, TURBT  Co-Treatment: OT  Co-Treatment Reason: to maximize pt. safety and performance  Prior to Session Communication: Bedside nurse  Patient Position Received: Bed, 2 rail up, Alarm on  General Comment: Pt. is 79 y/o male adm. due to trauma; pt.unaware of what happened; confused.  ER w/u revealed left gluteal intramuscular hemorrhage and age-indeterminate nondisplaced spinal process fx C5.  Pt.recently at [facility] with atrial flutter with rvr; d/c to snf then home with LifeVest; APS contacted by PCP for ?guardianship.   DX: hyponatremia; elev.INR; abrasion of scalp; uti.  Home Living:  Home Living  Type of Home: Apartment  Lives With:  Alone  Home Layout: Bed/bath upstairs (?second floor apt. (may be in a house))  Home Access: Stairs to enter with rails (12 steps to 2nd floor)  Prior Level of Function:  Prior Function Per Pt/Caregiver Report  Level of [place]: Independent with ADLs and functional transfers, Independent with homemaking with ambulation  Ambulatory Assistance: Independent (no ad)  Prior Function Comments: Pt. reports he is indep.with ambulation, adls and iadls.  States he drives and does own chores including laundry in the basement.  Has a friend to assist prn.  Precautions:  Precautions  Medical Precautions: Fall precautions, Cardiac precautions (LifeVest)  Precautions Comment:  (C5 spinal process fx; no restrictions in chart; no brace on pt.)       Objective   Pain:  Pain Assessment  Pain Assessment: 0-10  Pain Score: 0 - No pain  Cognition:  Cognition  Overall Cognitive Status: Impaired  Orientation Level:  (O x 2, cues; slowed processing)    General Assessments:  General Observation  General Observation: Pt. pleasant, cooperative for evaluation.    Activity Tolerance  Endurance: Decreased tolerance for upright activites    Sensation  Light Touch: No apparent deficits    Strength  Strength Comments: grossly leonidas hips 4-/5, knees/ankles 4/5  Static Sitting Balance  Static Sitting-Balance Support: No upper extremity supported  Static Sitting-Level of Assistance: Close supervision  Static Sitting-Comment/Number of Minutes: 5    Static Standing Balance  Static Standing-Balance Support: No upper extremity supported  Static Standing-Level of Assistance: Contact guard  Static Standing-Comment/Number of Minutes: 3  Dynamic Standing Balance  Dynamic Standing-Comments: F-  Functional Assessments:  Bed Mobility  Bed Mobility:  (sup<>sit with supervision)    Transfers  Transfer:  (sit<>stand from eob with SBA)    Ambulation/Gait Training  Ambulation/Gait Training Performed:  (Pt. ambulated approx. 30ft. without ad, req. CGA for safety.   ?LLD during gait as pt. lands hard on left le.  Mild instability overall.  Pt.unaware of his injuries, denies pain.)  Extremity/Trunk Assessments:  RLE   RLE : Within Functional Limits  LLE   LLE : Within Functional Limits  Outcome Measures:  [facility] Basic Mobility  Turning from your back to your side while in a flat bed without using bedrails: A little  Moving from lying on your back to sitting on the side of a flat bed without using bedrails: A little  Moving to and from bed to chair (including a wheelchair): A little  Standing up from a chair using your arms (e.g. wheelchair or bedside chair): A little  To walk in hospital room: A little  Climbing 3-5 steps with railing: A lot  Basic Mobility - Total Score: 17    Encounter Problems       Encounter Problems (Active)       PT Problem       Pt. will perform bed mobility independently (Progressing)       Start:  06/03/24    Expected End:  06/17/24            Pt. will txfr independently (Progressing)       Start:  06/03/24    Expected End:  06/17/24            Pt. will ambulate >=150ft. with lrd prn independently (Progressing)       Start:  06/03/24    Expected End:  06/17/24            Pt. will perform leonidas le therex x >=20reps for increased fxl strength/endurance (Progressing)       Start:  06/03/24    Expected End:  06/17/24            Pt. will demonstrate >=G dyn stand balance with lrd prn (Progressing)       Start:  06/03/24    Expected End:  06/17/24                   Education Documentation  Mobility Training, taught by Karmen Farrell, PT at 6/3/2024  3:31 PM.  Learner: Patient  Readiness: Acceptance  Method: Explanation, Demonstration  Response: Needs Reinforcement    Education Comments  No comments found.

## 2024-06-03 NOTE — CARE PLAN
Problem: Pain  Goal: My pain/discomfort is manageable  Outcome: Progressing     Problem: Safety  Goal: Patient will be injury free during hospitalization  Outcome: Progressing  Goal: I will remain free of falls  Outcome: Progressing     Problem: Daily Care  Goal: Daily care needs are met  Outcome: Progressing     Problem: Psychosocial Needs  Goal: Demonstrates ability to cope with hospitalization/illness  Outcome: Progressing  Goal: Collaborate with me, my family, and caregiver to identify my specific goals  Outcome: Progressing     Problem: Discharge Barriers  Goal: My discharge needs are met  Outcome: Progressing     Problem: Skin  Goal: Prevent/manage excess moisture  Outcome: Progressing  Flowsheets (Taken 6/2/2024 0004)  Prevent/manage excess moisture: Cleanse incontinence/protect with barrier cream  Goal: Prevent/minimize sheer/friction injuries  Outcome: Progressing  Flowsheets (Taken 6/2/2024 2338)  Prevent/minimize sheer/friction injuries: Use pull sheet  Goal: Promote skin healing  Outcome: Progressing  Flowsheets (Taken 6/2/2024 2338)  Promote skin healing: Assess skin/pad under line(s)/device(s)     Problem: Fall/Injury  Goal: Not fall by end of shift  Outcome: Progressing  Goal: Be free from injury by end of the shift  Outcome: Progressing  Goal: Verbalize understanding of personal risk factors for fall in the hospital  Outcome: Progressing  Goal: Verbalize understanding of risk factor reduction measures to prevent injury from fall in the home  Outcome: Progressing  Goal: Use assistive devices by end of the shift  Outcome: Progressing  Goal: Pace activities to prevent fatigue by end of the shift  Outcome: Progressing   The patient's goals for the shift include safety    The clinical goals for the shift include pt to remain hemodynamically stable throughout duation of shift    Over the shift, the patient did not make progress toward the following goals. Barriers to progression include  Recommendations  to address these barriers include .

## 2024-06-03 NOTE — PROGRESS NOTES
06/03/24 1338   Discharge Planning   Living Arrangements Alone   Support Systems None   Type of Residence Private residence   Do you have animals or pets at home? No   Who is requesting discharge planning? Provider     Met with pt,  pt admit for a fall, low Na++ an possible next fx.  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  Updated the SW, await therapy.  Wiill follow .  Yael Ronquillo RN [facility]

## 2024-06-03 NOTE — PROGRESS NOTES
"Cheko Jin is a 78 y.o. male on day 2 of admission presenting with Hyponatremia.    Subjective   Follow-up gluteal hematoma post trauma with altered mental status.  Denies abdominal pain.  Tolerating diet.   Does not know date realizes he is in the hospital  Objective currently denies chest pain shortness of breath leg pain    Physical Exam sclerae anicteric  Abrasion over frontal scalp region noted neck supple  Respiratory effort symmetric without accessory muscle use  Abdomen soft nontender.  Ecchymosis noted posteriorly.  Trace edema  Oriented to person but not time and place    Last Recorded Vitals  Blood pressure 114/60, pulse 54, temperature 36.6 °C (97.9 °F), resp. rate 18, height 1.753 m (5' 9.02\"), weight 79.4 kg (175 lb 0.7 oz), SpO2 96%.  Intake/Output last 3 Shifts:  I/O last 3 completed shifts:  In: - (0 mL/kg)   Out: 900 (11.3 mL/kg) [Urine:900 (0.3 mL/kg/hr)]  Weight: 79.4 kg     Relevant Results                             Assessment/Plan   Principal Problem:    Hyponatremia    Mental status changes are not related to recurrent trauma.  There is no acute ongoing surgical/trauma indication.  We will sign off at this time.       I spent 15 minutes in the professional and overall care of this patient.      Golden Rich MD      "

## 2024-06-03 NOTE — PRE-PROCEDURE NOTE
Awaiting MRI C spine for non-displaced C5 spinous process, laminar fracture.  Shira Morin, APRN-CNP

## 2024-06-04 LAB
ANION GAP SERPL CALC-SCNC: 12 MMOL/L (ref 10–20)
BUN SERPL-MCNC: 52 MG/DL (ref 6–23)
CALCIUM SERPL-MCNC: 8.5 MG/DL (ref 8.6–10.3)
CHLORIDE SERPL-SCNC: 104 MMOL/L (ref 98–107)
CO2 SERPL-SCNC: 25 MMOL/L (ref 21–32)
CREAT SERPL-MCNC: 2.07 MG/DL (ref 0.5–1.3)
EGFRCR SERPLBLD CKD-EPI 2021: 32 ML/MIN/1.73M*2
GLUCOSE SERPL-MCNC: 96 MG/DL (ref 74–99)
HOLD SPECIMEN: NORMAL
INR PPP: 1.2 (ref 0.9–1.1)
POTASSIUM SERPL-SCNC: 3.6 MMOL/L (ref 3.5–5.3)
PROTHROMBIN TIME: 13.5 SECONDS (ref 9.8–12.8)
SODIUM SERPL-SCNC: 137 MMOL/L (ref 136–145)

## 2024-06-04 PROCEDURE — 2500000001 HC RX 250 WO HCPCS SELF ADMINISTERED DRUGS (ALT 637 FOR MEDICARE OP): Performed by: INTERNAL MEDICINE

## 2024-06-04 PROCEDURE — 2500000004 HC RX 250 GENERAL PHARMACY W/ HCPCS (ALT 636 FOR OP/ED): Performed by: NURSE PRACTITIONER

## 2024-06-04 PROCEDURE — 1200000002 HC GENERAL ROOM WITH TELEMETRY DAILY

## 2024-06-04 PROCEDURE — 82374 ASSAY BLOOD CARBON DIOXIDE: CPT | Performed by: NURSE PRACTITIONER

## 2024-06-04 PROCEDURE — 2500000002 HC RX 250 W HCPCS SELF ADMINISTERED DRUGS (ALT 637 FOR MEDICARE OP, ALT 636 FOR OP/ED): Performed by: INTERNAL MEDICINE

## 2024-06-04 PROCEDURE — 2500000001 HC RX 250 WO HCPCS SELF ADMINISTERED DRUGS (ALT 637 FOR MEDICARE OP): Performed by: NURSE PRACTITIONER

## 2024-06-04 PROCEDURE — 85610 PROTHROMBIN TIME: CPT | Performed by: NURSE PRACTITIONER

## 2024-06-04 PROCEDURE — 36415 COLL VENOUS BLD VENIPUNCTURE: CPT | Performed by: NURSE PRACTITIONER

## 2024-06-04 PROCEDURE — 99231 SBSQ HOSP IP/OBS SF/LOW 25: CPT | Performed by: NURSE PRACTITIONER

## 2024-06-04 PROCEDURE — 99232 SBSQ HOSP IP/OBS MODERATE 35: CPT | Performed by: INTERNAL MEDICINE

## 2024-06-04 RX ORDER — ATORVASTATIN CALCIUM 40 MG/1
40 TABLET, FILM COATED ORAL DAILY
Status: DISCONTINUED | OUTPATIENT
Start: 2024-06-05 | End: 2024-06-12 | Stop reason: HOSPADM

## 2024-06-04 RX ORDER — WARFARIN SODIUM 5 MG/1
5 TABLET ORAL DAILY
Status: DISCONTINUED | OUTPATIENT
Start: 2024-06-04 | End: 2024-06-06

## 2024-06-04 RX ADMIN — HYDRALAZINE HYDROCHLORIDE 25 MG: 25 TABLET, FILM COATED ORAL at 20:00

## 2024-06-04 RX ADMIN — HEPARIN SODIUM 5000 UNITS: 5000 INJECTION INTRAVENOUS; SUBCUTANEOUS at 05:53

## 2024-06-04 RX ADMIN — ISOSORBIDE DINITRATE 20 MG: 10 TABLET ORAL at 08:52

## 2024-06-04 RX ADMIN — DAPAGLIFLOZIN 5 MG: 5 TABLET, FILM COATED ORAL at 12:18

## 2024-06-04 RX ADMIN — DOCUSATE SODIUM 100 MG: 100 CAPSULE, LIQUID FILLED ORAL at 08:53

## 2024-06-04 RX ADMIN — ATORVASTATIN CALCIUM 10 MG: 10 TABLET, FILM COATED ORAL at 08:52

## 2024-06-04 RX ADMIN — WARFARIN SODIUM 5 MG: 5 TABLET ORAL at 18:10

## 2024-06-04 RX ADMIN — AMIODARONE HYDROCHLORIDE 200 MG: 200 TABLET ORAL at 08:54

## 2024-06-04 RX ADMIN — HYDRALAZINE HYDROCHLORIDE 25 MG: 25 TABLET, FILM COATED ORAL at 08:54

## 2024-06-04 RX ADMIN — TAMSULOSIN HYDROCHLORIDE 0.4 MG: 0.4 CAPSULE ORAL at 08:54

## 2024-06-04 RX ADMIN — METOPROLOL SUCCINATE 50 MG: 50 TABLET, EXTENDED RELEASE ORAL at 08:53

## 2024-06-04 RX ADMIN — ALLOPURINOL 150 MG: 300 TABLET ORAL at 08:53

## 2024-06-04 RX ADMIN — CHOLECALCIFEROL TAB 25 MCG (1000 UNIT) 2000 UNITS: 25 TAB at 08:53

## 2024-06-04 RX ADMIN — FINASTERIDE 5 MG: 5 TABLET, FILM COATED ORAL at 08:53

## 2024-06-04 ASSESSMENT — COGNITIVE AND FUNCTIONAL STATUS - GENERAL
DRESSING REGULAR UPPER BODY CLOTHING: A LITTLE
TURNING FROM BACK TO SIDE WHILE IN FLAT BAD: A LITTLE
TOILETING: A LITTLE
HELP NEEDED FOR BATHING: A LITTLE
DAILY ACTIVITIY SCORE: 22
PERSONAL GROOMING: A LITTLE
CLIMB 3 TO 5 STEPS WITH RAILING: A LITTLE
WALKING IN HOSPITAL ROOM: A LITTLE
MOVING TO AND FROM BED TO CHAIR: A LITTLE
STANDING UP FROM CHAIR USING ARMS: A LITTLE
HELP NEEDED FOR BATHING: A LITTLE
MOBILITY SCORE: 22
WALKING IN HOSPITAL ROOM: A LITTLE
MOBILITY SCORE: 19
CLIMB 3 TO 5 STEPS WITH RAILING: A LITTLE
TOILETING: A LITTLE
EATING MEALS: A LITTLE
DAILY ACTIVITIY SCORE: 18
DRESSING REGULAR LOWER BODY CLOTHING: A LITTLE

## 2024-06-04 ASSESSMENT — PAIN - FUNCTIONAL ASSESSMENT
PAIN_FUNCTIONAL_ASSESSMENT: 0-10
PAIN_FUNCTIONAL_ASSESSMENT: 0-10

## 2024-06-04 ASSESSMENT — PAIN SCALES - GENERAL
PAINLEVEL_OUTOF10: 0 - NO PAIN
PAINLEVEL_OUTOF10: 0 - NO PAIN

## 2024-06-04 NOTE — PROGRESS NOTES
"Cheko Jin is a 78 y.o. male on day 3 of admission presenting with Hyponatremia.    Subjective   No adverse events overnight       Objective   Patient refused MRIs due to presence of shrapnel  Physical Exam  Well nourished, well developed male, asleep in bed  Skin is warm / dry, no obvious lesions on exposed skin   Thorax is midline; chest expansion is symmetric  Abdomen is not distended  Urine - no incontinence    Last Recorded Vitals  Blood pressure 115/66, pulse 57, temperature 36.8 °C (98.2 °F), resp. rate 14, height 1.753 m (5' 9.02\"), weight 79.4 kg (175 lb 0.7 oz), SpO2 94%.  Intake/Output last 3 Shifts:  I/O last 3 completed shifts:  In: - (0 mL/kg)   Out: 850 (10.7 mL/kg) [Urine:850 (0.3 mL/kg/hr)]  Weight: 79.4 kg     Relevant Results  No results found for this or any previous visit (from the past 24 hour(s)).              Assessment/Plan   Principal Problem:    Hyponatremia  Non-displaced comminuted C5 spinous process, laminar fracture. Unable to have MRI due to shrapnel (verified by CT)  Discussed with Dr. Davonte Singh:  - Can follow up with Dr. Singh as outpatient  - Black for discharge planning from Neurosurgery standpoint           I spent 10 minutes in the professional and overall care of this patient.      Shira Morin, APRN-CNP      "

## 2024-06-04 NOTE — PROGRESS NOTES
"Cheko Jin is a 78 y.o. male on day 3 of admission presenting with Hyponatremia.      Subjective   Creatinine clearance continues to improve, with value 2.08 this a.m.  Given continued state of STUART and patient already on stroke prophylactic medication in the form of Coumadin 5 mg p.o. daily, we will defer CT angiogram testing at this time as patient is appropriate for discharge.  Atorvastatin has been increased to 40 mg p.o. daily to assist with basilar artery stenosis initially noted on imaging.       Objective     Last Recorded Vitals  Blood pressure 114/60, pulse 55, temperature 36.5 °C (97.7 °F), temperature source Temporal, resp. rate 18, height 1.753 m (5' 9.02\"), weight 79.4 kg (175 lb 0.7 oz), SpO2 95%.    Relevant Results  Scheduled medications  allopurinol, 150 mg, oral, Daily  amiodarone, 200 mg, oral, Daily  atorvastatin, 10 mg, oral, Daily  cholecalciferol, 2,000 Units, oral, Daily  dapagliflozin propanediol, 5 mg, oral, Daily  docusate sodium, 100 mg, oral, BID  finasteride, 5 mg, oral, Daily  [Held by provider] furosemide, 40 mg, oral, Daily  hydrALAZINE, 25 mg, oral, BID  isosorbide dinitrate, 20 mg, oral, Daily  metoprolol succinate XL, 50 mg, oral, Daily  tamsulosin, 0.4 mg, oral, Daily  warfarin, 5 mg, oral, Daily      Continuous medications     PRN medications  PRN medications: acetaminophen **OR** [DISCONTINUED] acetaminophen **OR** [DISCONTINUED] acetaminophen, melatonin  ECG 12 lead    Result Date: 6/3/2024  Sinus bradycardia Nonspecific T abnrm, anterolateral leads Prolonged QT interval    CT chest abdomen pelvis w IV contrast    Result Date: 6/1/2024  STUDY: CT Chest, Abdomen, and Pelvis with IV Contrast; 06/01/2023 4:52 pm INDICATION: Trauma.  COMPARISON: None available.  ACCESSION NUMBER(S): [id number] ORDERING CLINICIAN: ELIZABETH AKHTAR TECHNIQUE: CT of the chest, abdomen, and pelvis was performed.  Contiguous axial images were obtained at 3 mm slice thickness through the chest, " abdomen, and pelvis.  Coronal and sagittal reconstructions at 3 mm slice thickness were performed.  Omnipaque 350:90 mL was administered intravenously.  FINDINGS: CHEST: MEDIASTINUM: Atherosclerosis of the thoracic aorta and coronary arteries is present.  Thoracic aorta is not aneurysmal.  Heart is mildly enlarged.  No pericardial effusion.  LUNGS/PLEURA: There is no pleural effusion, pleural thickening, or pneumothorax. The airways are patent. Atelectatic changes are present.  Shrapnel fragment noted about the LEFT scapula.  This is age indeterminate.  Lungs are without interstitial disease or suspicious nodules. LYMPH NODES: Thoracic lymph nodes are not enlarged. ABDOMEN:  LIVER: No hepatomegaly.  Smooth surface contour.  Normal attenuation.  BILE DUCTS: No intrahepatic or extrahepatic biliary ductal dilatation.  GALLBLADDER: Cholelithiasis is present without evidence of acute cholecystitis.  STOMACH: No abnormalities identified.  PANCREAS: No masses or ductal dilatation.  SPLEEN: No splenomegaly or focal splenic lesion.  ADRENAL GLANDS: No thickening or nodules.  KIDNEYS AND URETERS: Kidneys are normal in size and location.  No renal or ureteral calculi.  PELVIS: RIGHT arthroplasty is present.  Artifact from the prosthesis limits assessment of the pelvis.   BLADDER: No abnormalities identified.  REPRODUCTIVE ORGANS: No abnormalities identified.  BOWEL: Colonic diverticulosis is present without evidence of acute diverticulitis.   VESSELS: No abnormalities identified.  Abdominal aorta is normal in caliber.  PERITONEUM/RETROPERITONEUM/LYMPH NODES: Shrapnel within the LEFT midabdomen also appears to be present.  No free fluid.  No pneumoperitoneum. No lymphadenopathy.  ABDOMINAL WALL: No abnormalities identified. SOFT TISSUES: No abnormalities identified.  BONES: No acute fracture or aggressive osseous lesion.  There is evidence of prior remote left-sided rib trauma.  Degenerative changes noted about the shoulders.   Shrapnel fragments about the pelvis also demonstrated.  There is fullness of the LEFT gluteus dina muscle, suggestive of intramuscular hemorrhage.  No ricky hematoma identified.  Overlying subcutaneous edema noted.  Multilevel degenerative changes are present throughout the thoracic and lumbar spine.  Dextroconvex scoliosis of the lumbar spine is present.  Mild degenerative change of the LEFT hip is present.    1. Likely intramuscular hemorrhage within the LEFT gluteus dina muscle without ricky hematoma.  Overlying subcutaneous edema noted. 2. Shrapnel fragments noted about the LEFT scapula, LEFT midabdomen and pelvis. 3. Cholelithiasis without evidence of acute cholecystitis. 4. Colonic diverticulosis is present without evidence of acute diverticulitis. 5. Cardiomegaly.  Atherosclerosis of the thoracic aorta and coronary arteries is present. Signed by Davonte Holloway II, MD    XR pelvis 1-2 views    Result Date: 6/1/2024  STUDY: Pelvis Radiographs; 6/1/2024 at 4:18 PM. INDICATION: Obvious trauma to the head and abdomen, patient cannot recall events. Evaluate for fracture. COMPARISON: None available. ACCESSION NUMBER(S): [id number] ORDERING CLINICIAN: ELIZABETH AKHTAR TECHNIQUE:  One view(s) of the pelvis. FINDINGS:  No acute fracture. Proximal plastic partially visualized. Radiodensities project over the pelvis which may relate to a prior penetrating injury.    No acute fracture. Signed by Mihir Marino MD    XR chest 1 view    Result Date: 6/1/2024  STUDY: Chest Radiograph;  6/1/2024 4:19 PM INDICATION: Trauma. COMPARISON: None Available. ACCESSION NUMBER(S): [id number] ORDERING CLINICIAN: ELIZABETH AKHTAR TECHNIQUE:  Frontal chest was obtained at 16:18 hours. FINDINGS: CARDIOMEDIASTINAL SILHOUETTE: Mild cardiomegaly.  LUNGS: Lungs are clear.  ABDOMEN: No remarkable upper abdominal findings.  BONES: No acute osseous changes.    No acute cardiopulmonary process. Signed by Mihir Marino MD    CT  thoracic spine wo IV contrast    Result Date: 6/1/2024  Interpreted By:  Lacey Deleon, STUDY: CT THORACIC SPINE WO IV CONTRAST; CT LUMBAR SPINE WO IV CONTRAST; 6/1/2024 4:50 pm   INDICATION: Signs/Symptoms:Obvious trauma to the head and abdomen, patient cannot recall events, on warfarin, eval for fracture.   COMPARISON: None.   ACCESSION NUMBER(S): [id number]; [id number]   ORDERING CLINICIAN: ELIZABETH AKHTAR   TECHNIQUE: Axial CT images of the thoracic and lumbar spine are obtained. Axial, coronal and sagittal reconstructions are submitted for review.   FINDINGS: Thoracic spine:   Alignment: Within normal limits.   Vertebrae/Intervertebral Discs: The thoracic vertebral body heights are intact. Moderate multilevel discogenic degenerative changes including disc space narrowing, endplate sclerosis, spurring and vacuum disc phenomena. Findings worse over the lower thoracic levels. There is no significant central canal stenosis.   Paraspinous Soft Tissues: No prevertebral soft tissue swelling   Lumbar spine:   Alignment: Moderate dextroscoliosis centered at L3. Minimal anterolisthesis of L3 on L4.   Vertebrae/Intervertebral Discs: The lumbar vertebral body heights are intact. Advanced multilevel discogenic degenerative changes including disc space narrowing, endplate sclerosis, spurring and vacuum disc phenomena. There is 3 mm anterolisthesis of L3 on L4. Advanced facet joint sclerosis and hypertrophy bilaterally at multiple levels. There is no significant central canal stenosis.   Paraspinous Soft Tissues: No prevertebral soft tissue swelling.   Total right hip arthroplasty changes.       Thoracic spine: 1. Multilevel degenerative changes. No CT evidence for acute injury. 2. Additional detailed findings as above       Lumbar spine: 1. Dextroscoliosis and advanced multilevel degenerative changes. No CT evidence for acute injury. 2. Additional detailed findings as above           MACRO: None   Signed by: Lacey Deleon  6/1/2024 5:24 PM Dictation workstation:   [id number]    CT lumbar spine wo IV contrast    Result Date: 6/1/2024  Interpreted By:  Lacey Deleon, STUDY: CT THORACIC SPINE WO IV CONTRAST; CT LUMBAR SPINE WO IV CONTRAST; 6/1/2024 4:50 pm   INDICATION: Signs/Symptoms:Obvious trauma to the head and abdomen, patient cannot recall events, on warfarin, eval for fracture.   COMPARISON: None.   ACCESSION NUMBER(S): [id number]; [id number]   ORDERING CLINICIAN: ELIZABETH AKHTAR   TECHNIQUE: Axial CT images of the thoracic and lumbar spine are obtained. Axial, coronal and sagittal reconstructions are submitted for review.   FINDINGS: Thoracic spine:   Alignment: Within normal limits.   Vertebrae/Intervertebral Discs: The thoracic vertebral body heights are intact. Moderate multilevel discogenic degenerative changes including disc space narrowing, endplate sclerosis, spurring and vacuum disc phenomena. Findings worse over the lower thoracic levels. There is no significant central canal stenosis.   Paraspinous Soft Tissues: No prevertebral soft tissue swelling   Lumbar spine:   Alignment: Moderate dextroscoliosis centered at L3. Minimal anterolisthesis of L3 on L4.   Vertebrae/Intervertebral Discs: The lumbar vertebral body heights are intact. Advanced multilevel discogenic degenerative changes including disc space narrowing, endplate sclerosis, spurring and vacuum disc phenomena. There is 3 mm anterolisthesis of L3 on L4. Advanced facet joint sclerosis and hypertrophy bilaterally at multiple levels. There is no significant central canal stenosis.   Paraspinous Soft Tissues: No prevertebral soft tissue swelling.   Total right hip arthroplasty changes.       Thoracic spine: 1. Multilevel degenerative changes. No CT evidence for acute injury. 2. Additional detailed findings as above       Lumbar spine: 1. Dextroscoliosis and advanced multilevel degenerative changes. No CT evidence for acute injury. 2. Additional detailed  findings as above           MACRO: None   Signed by: Lacey Deleon 6/1/2024 5:24 PM Dictation workstation:   [id number]    CT cervical spine wo IV contrast    Result Date: 6/1/2024  Interpreted By:  Lacey Deleon, STUDY: CT CERVICAL SPINE WO IV CONTRAST;  6/1/2024 4:50 pm   INDICATION: Signs/Symptoms:Obvious trauma to the head and abdomen, patient cannot recall events, on warfarin, eval for fracture.   COMPARISON: None.   ACCESSION NUMBER(S): [id number]   ORDERING CLINICIAN: ELIZABETH AKHTAR   TECHNIQUE: Axial CT images of the cervical spine are obtained. Axial, coronal and sagittal reconstructions are provided for review.   FINDINGS:     Fractures: Cortical irregularity with multiple hairline lucencies involving the spinous process of the C5 vertebra suggestive of a fracture of uncertain age. No associated fluid is seen. No spinal canal stenosis.   Vertebral Alignment: 3 mm anterolisthesis of C3 on C4 and 2 mm retrolisthesis of C4 on C5. There is 3 mm anterolisthesis of C7 on T1.   Craniocervical Junction: The odontoid process and craniocervical junction are intact.   Vertebrae/Disc Spaces:  Moderate multilevel discogenic degenerative changes including disc space narrowing, endplate sclerosis, spurring and posterior disc osteophyte complex. Findings worse from C3-C7.   Prevertebral/Paraspinal Soft Tissues: No prevertebral soft tissue swelling.         1. Concern for comminuted nondisplaced fractures involving spinal process of C5 vertebra, of uncertain age. This is classified as stable fracture. Further evaluation with MRI recommended for better assessment and to assess for spinal cord injury.   2. Advanced multilevel degenerative changes throughout the cervical spine. No additional acute fractures or subluxation.   SUPPLEMENTAL INFORMATION: Notifi message was left for ELIZABETH AKHTAR regarding this exam by Dr. Deleon on 6/1/2024 at approximately 17:05 hours.   Critical Finding:  See findings. Notification was  initiated on 6/1/2024 at 5:06 pm by  Lacey Deleon.  (**-OCF-**)   Signed by: Lacey Deleon 6/1/2024 5:07 PM Dictation workstation:   [id number]    CT head W O contrast trauma protocol    Result Date: 6/1/2024  Interpreted By:  Lacey Deleon, STUDY: CT HEAD W/O CONTRAST TRAUMA PROTOCOL;  6/1/2024 4:50 pm   INDICATION: Signs/Symptoms:Obvious trauma to the head on warfarin, patient cannot recall trauma or events, eval for bleed.   COMPARISON: None.   ACCESSION NUMBER(S): [id number]   ORDERING CLINICIAN: ELIZABETH AKHTAR   TECHNIQUE: Noncontrast axial CT scan of head was performed. Angled reformats in brain and bone windows were generated. The images were reviewed in bone, brain, blood and soft tissue windows.   FINDINGS: CSF Spaces: Moderate brain atrophy evidence by prominence of ventricles, sulci and cisterns. There is no extraaxial fluid collection.   Parenchyma: Confluent areas of subcortical and periventricular white matter changes which given patient's age are suggestive of chronic small vessel ischemic disease. The grey-white differentiation is intact. There is no mass effect or midline shift.  There is no intracranial hemorrhage. Hyperdensity involving the basilar artery which may represent focal wall calcification, however, thrombosis may have similar appearance.   Calvarium: The calvarium is unremarkable.   Paranasal sinuses and mastoids: Visualized paranasal sinuses and mastoids are clear.       1. Atherosclerotic wall calcification versus thrombus involving the basilar artery. Correlation with MRI/MRA recommended for better assessment.   2. Brain atrophy. No evidence of acute cortical infarct or intracranial hemorrhage.   MACRO: Critical Finding:  See findings. Notification was initiated on 6/1/2024 at 5:02 pm by  Lacey Deleon.  (**-YCF-**)     Signed by: Lacey Deleon 6/1/2024 5:02 PM Dictation workstation:   [id number]    Echocardiogram stress test    Result Date: 5/8/2024  Nadja Williamson DO      5/8/2024  3:51 PM Limited Cardiac Ultrasound for Cardiac Pathology US CARDIAC (POC) ED USE ONLY  ([place], NO AC) Date/Time: 5/8/2024 1:51 PM Performed by: Nadja Williamson DO Authorized by: Nadja Williamson DO   Indication: Patient presents with chest pain, SOB, and/or concern for shock. Procedure in detail: Using the phased array probe, the heart was imaged in a apical, subcostal and parasternal view. Pericardial Effusion was absent. Cardiac activity was detected. Left Ventricular Function was severely impaired. Right Ventricular Size was Normal. Yes still images or video images were saved for this exam.. Conclusion: Pericardial effusion was absent. Other Pertinent Findings Included: Decreased LVEF. This limited imaging study was performed by: Attending only.    XR chest 1 view    Result Date: 5/8/2024  * * *Final Report* * * DATE OF EXAM: May  8 2024 12:59PM   MMX   [id number]  -  XR CHEST 1V FRONTAL PORT  / ACCESSION #  [id number] PROCEDURE REASON: Shortness of breath      * * * * Physician Interpretation * * * *  EXAMINATION:  CHEST RADIOGRAPH (PORTABLE SINGLE VIEW AP) Exam Date/Time:  5/8/2024 12:59 PM CLINICAL HISTORY: Shortness of breath MQ:  XCPR_5 Comparison:  11/20/2012 RESULT: Lines, tubes, and devices:  None. Lungs and pleura:  No alveolar airspace opacity.  No pneumothorax.   Stable mild curvilinear fibrosis in the lingula.  Costophrenic angles are clear. Cardiomediastinal silhouette:  Enlarged cardiomediastinal silhouette. Other: Thoracic spine degenerative changes.  3 mm rounded metallic radiodensity again projects of the right cardiophrenic region.    IMPRESSION: Cardiomegaly.  No radiographic evidence for acute cardiopulmonary disease. : PSCROSETTE   Transcribe Date/Time: May  8 2024  1:20P Dictated by : CANDICE ZACARIAS MD This examination was interpreted and the report reviewed and electronically signed by: CANDICE ZACARIAS MD on May  8 2024  1:21PM  EST   Results for orders placed or performed during  the hospital encounter of 06/01/24 (from the past 24 hour(s))   Basic Metabolic Panel   Result Value Ref Range    Glucose 96 74 - 99 mg/dL    Sodium 137 136 - 145 mmol/L    Potassium 3.6 3.5 - 5.3 mmol/L    Chloride 104 98 - 107 mmol/L    Bicarbonate 25 21 - 32 mmol/L    Anion Gap 12 10 - 20 mmol/L    Urea Nitrogen 52 (H) 6 - 23 mg/dL    Creatinine 2.07 (H) 0.50 - 1.30 mg/dL    eGFR 32 (L) >60 mL/min/1.73m*2    Calcium 8.5 (L) 8.6 - 10.3 mg/dL   Lavender Top   Result Value Ref Range    Extra Tube Hold for add-ons.    Protime-INR   Result Value Ref Range    Protime 13.5 (H) 9.8 - 12.8 seconds    INR 1.2 (H) 0.9 - 1.1                         [place] Coma Scale  Best Eye Response: Spontaneous  Best Verbal Response: Oriented  Best Motor Response: Follows commands  [place] Coma Scale Score: 15                             Assessment/Plan      Principal Problem:    Hyponatremia  Basilar artery stenosis  -Continue Coumadin 5 mg p.o. daily for CVA prophylaxis.  Atorvastatin has been increased to 40 mg p.o. daily to assist with findings of basilar artery stenosis.  Given current state of STUART, we will defer from any additional testing at this time.  -Recommendations for needs during hospitalization and at discharge via PT and OT  -Neurosurgery consulting for C5 fracture; patient to follow-up with the specialty in the outpatient setting.  -Continue promotion of lifestyle modifications, such as: Strict BP and glycemic control, dietary habit changes, incorporation of daily exercise regimen, adherence to all prescription/OTC medication schedules, attendance to all follow-up appointments, cessation from smoking if applicable, abstinence from alcohol and illicit drug use if applicable  -Patient to follow-up with PCP in 1 to 2 weeks postdischarge  -Patient to follow-up with vascular neurologist Dr. Annabella Sun within 1 to 2 months postdischarge    Total face-to-face time spent with patient today was approximately 15 minutes; more  than 50% of my time was spent counseling patient on: preparation to see patient via chart review of resulted laboratory values, radiographic imaging, prescribed medications, and impairment of involved organ systems. Time also spent on medical examination, placing appropriate orders for testing/medications, communicating with other health care providers, counseling/educating the patient/family, and care coordination.    Neurology to sign off at this time. Thank you for the opportunity to be a part of this patient's multidisciplinary treatment team.  If any additional questions or concerns arise, please do not hesitate to contact me or the on-call neurologist via [facility].    *This note was created using voice recognition transcription software. Despite proofreading, unintentional typographical errors may be present. Please contact the department of neurology with any questions or concerns.         Rhonda Pittman, APRN-CNP

## 2024-06-04 NOTE — PROGRESS NOTES
"Cheko Jin is a 78 y.o. male on day 3 of admission presenting with Hyponatremia.    Subjective   Pt alert and awake in bed.  Does not recall that he fell.  Denies any headache, vision changes, pain in his neck, pain in his buttocks.       Objective     Physical Exam    GEN:  awake, not in acute distress  HEENT:  laceration and skin excoriated top of head, PERRL, EOM intact, nares patent  Cardio:  RRR, no murmurs  Resp:  CTAB, no wheezing  Abd:  +BS, soft, nontender  :  deferred  Neuro:  alert and oriented, CN2-12 grossly intact, no focal deficits  Msk:  no gross deformities, no joint effusions  Skin:  warm, dry, intact  Psych:  appropriate in mood and behavior      Last Recorded Vitals  Blood pressure 114/60, pulse 55, temperature 36.5 °C (97.7 °F), temperature source Temporal, resp. rate 18, height 1.753 m (5' 9.02\"), weight 79.4 kg (175 lb 0.7 oz), SpO2 95%.  Intake/Output last 3 Shifts:  I/O last 3 completed shifts:  In: - (0 mL/kg)   Out: 750 (9.4 mL/kg) [Urine:750 (0.3 mL/kg/hr)]  Weight: 79.4 kg     Relevant Results              No results found.          Assessment/Plan   Principal Problem:    Hyponatremia    # Hyponatremia, resolved  # Mechanical fall  # Left gluteus dina IM hemorrhage  # Basilar artery calcification vs thrombus  # Age-indeterminate C5 spinous process fracture  # STUART on CKD  # A-flutter on warfarin  # HFrEF, dilated cardiomyopathy  # Nonobstructive CAD  # h/o strokes  # Advanced Alzheimer dementia    Patient no longer hyponatremic.  Patient has no specific complaints, denies any pain in his neck or buttocks.    Neurosurgery recommends outpatient follow up.  His hemoglobin has been stable, vital signs stable.  Plan to resume warfarin.  Neurology recommending CT angio of head and neck to evaluate for possible basilar artery thrombosis.  However, as patient will resume his warfarin I do not think this study will change his medical recommendation.  Will discuss with neurology.  PT and " OT.           I spent 30 minutes in the professional and overall care of this patient.      [place], DO

## 2024-06-04 NOTE — CARE PLAN
The patient's goals for the shift include safety    The clinical goals for the shift include pt will remian free form injury      Problem: Pain  Goal: My pain/discomfort is manageable  Outcome: Progressing     Problem: Safety  Goal: Patient will be injury free during hospitalization  Outcome: Progressing  Goal: I will remain free of falls  Outcome: Progressing     Problem: Daily Care  Goal: Daily care needs are met  Outcome: Progressing     Problem: Psychosocial Needs  Goal: Demonstrates ability to cope with hospitalization/illness  Outcome: Progressing  Goal: Collaborate with me, my family, and caregiver to identify my specific goals  Outcome: Progressing     Problem: Discharge Barriers  Goal: My discharge needs are met  Outcome: Progressing     Problem: Skin  Goal: Prevent/manage excess moisture  Outcome: Progressing  Goal: Prevent/minimize sheer/friction injuries  Outcome: Progressing  Goal: Promote skin healing  Outcome: Progressing     Problem: Fall/Injury  Goal: Not fall by end of shift  Outcome: Progressing  Goal: Be free from injury by end of the shift  Outcome: Progressing  Goal: Verbalize understanding of personal risk factors for fall in the hospital  Outcome: Progressing  Goal: Verbalize understanding of risk factor reduction measures to prevent injury from fall in the home  Outcome: Progressing  Goal: Use assistive devices by end of the shift  Outcome: Progressing  Goal: Pace activities to prevent fatigue by end of the shift  Outcome: Progressing     Problem: Pain - Adult  Goal: Verbalizes/displays adequate comfort level or baseline comfort level  Outcome: Progressing     Problem: Safety - Adult  Goal: Free from fall injury  Outcome: Progressing     Problem: Discharge Planning  Goal: Discharge to home or other facility with appropriate resources  Outcome: Progressing     Problem: Chronic Conditions and Co-morbidities  Goal: Patient's chronic conditions and co-morbidity symptoms are monitored and  maintained or improved  Outcome: Progressing

## 2024-06-04 NOTE — PROGRESS NOTES
"Updated the SW on call on this case, refer to her notes.  Yael Ronquillo RN T[facility]    [phone]  called the POA Arcelia at [phone] and spoke with her regarding pt,  Arcelia is the daughter of pt's S.O.  Per POA, pt never  or had children and his remaining family members are estranged.  Per POA pt owns many properties and told me that the pt would need to tell me whom his   is as far as finances are, pt has a will  and her brother(Arcelia's) is the executor.  Per POA pt lives in address he provided he usually is at his S.O. home and sleeps at his home.  She also told me that he can not go home alone nor can he stay with his S.O. as she is trying to place her mother in an assisted living , the POA told me that the pt needs a \"place he can not get out of.\"  Plan is for psych consult.  RN and the POA were updated.  I did speak with the pt and he was not answering my questions appropriately or answering with a question.  Yael Ronquillo RN T[facility]    "

## 2024-06-04 NOTE — PROGRESS NOTES
SW was notified by [facility] of SW consult as patient was noted to have an active APS and was noted in attending note that expert evaluation was done and APS was pursing guardianship. Writer called and spoke with APS who reports patient  is Caity Castaneda and left her message to return call/ contact information provided. Patient noted to have POA Suri called and left message to return call. PT recommending moderate intensity. Will continue to follow and assist as needed. FLORESITA Bautista    Update [id number] Spoke with APS worker Caity Castaneda [phone]. She confirmed patient is active and that she was waiting on the expert evaluation from the Dr. Gael HILLIARD To pursue guardianship. She reports that Suri is the daughter of the significant other Ophelia. Caity does not have a copy of the POA paperwork and none is on file at the hospital. She is awaiting return call from physician in regards to the expert evaluation. FLORESITA Bautista

## 2024-06-05 LAB
ATRIAL RATE: 51 BPM
HOLD SPECIMEN: NORMAL
HOLD SPECIMEN: NORMAL
INR PPP: 1.2 (ref 0.9–1.1)
P AXIS: 45 DEGREES
PR INTERVAL: 189 MS
PROTHROMBIN TIME: 13.2 SECONDS (ref 9.8–12.8)
Q ONSET: 251 MS
QRS COUNT: 9 BEATS
QRS DURATION: 112 MS
QT INTERVAL: 577 MS
QTC CALCULATION(BAZETT): 532 MS
QTC FREDERICIA: 547 MS
R AXIS: 14 DEGREES
T AXIS: 94 DEGREES
T OFFSET: 540 MS
VENTRICULAR RATE: 51 BPM

## 2024-06-05 PROCEDURE — 99223 1ST HOSP IP/OBS HIGH 75: CPT | Performed by: NURSE PRACTITIONER

## 2024-06-05 PROCEDURE — 85610 PROTHROMBIN TIME: CPT | Performed by: NURSE PRACTITIONER

## 2024-06-05 PROCEDURE — 2500000001 HC RX 250 WO HCPCS SELF ADMINISTERED DRUGS (ALT 637 FOR MEDICARE OP): Performed by: NURSE PRACTITIONER

## 2024-06-05 PROCEDURE — 36415 COLL VENOUS BLD VENIPUNCTURE: CPT | Performed by: NURSE PRACTITIONER

## 2024-06-05 PROCEDURE — 2500000001 HC RX 250 WO HCPCS SELF ADMINISTERED DRUGS (ALT 637 FOR MEDICARE OP): Performed by: INTERNAL MEDICINE

## 2024-06-05 PROCEDURE — 97535 SELF CARE MNGMENT TRAINING: CPT | Mod: GO

## 2024-06-05 PROCEDURE — 1200000002 HC GENERAL ROOM WITH TELEMETRY DAILY

## 2024-06-05 PROCEDURE — 2500000002 HC RX 250 W HCPCS SELF ADMINISTERED DRUGS (ALT 637 FOR MEDICARE OP, ALT 636 FOR OP/ED): Mod: MUE | Performed by: INTERNAL MEDICINE

## 2024-06-05 PROCEDURE — 97110 THERAPEUTIC EXERCISES: CPT | Mod: GP,CQ

## 2024-06-05 PROCEDURE — 99232 SBSQ HOSP IP/OBS MODERATE 35: CPT | Performed by: INTERNAL MEDICINE

## 2024-06-05 PROCEDURE — 97116 GAIT TRAINING THERAPY: CPT | Mod: GP,CQ

## 2024-06-05 PROCEDURE — 2500000002 HC RX 250 W HCPCS SELF ADMINISTERED DRUGS (ALT 637 FOR MEDICARE OP, ALT 636 FOR OP/ED): Performed by: INTERNAL MEDICINE

## 2024-06-05 RX ORDER — LORAZEPAM 0.5 MG/1
0.5 TABLET ORAL EVERY 8 HOURS PRN
Status: DISCONTINUED | OUTPATIENT
Start: 2024-06-05 | End: 2024-06-12 | Stop reason: HOSPADM

## 2024-06-05 RX ORDER — TALC
3 POWDER (GRAM) TOPICAL NIGHTLY
Status: DISCONTINUED | OUTPATIENT
Start: 2024-06-05 | End: 2024-06-12 | Stop reason: HOSPADM

## 2024-06-05 RX ORDER — LORAZEPAM 2 MG/ML
1 INJECTION INTRAMUSCULAR EVERY 8 HOURS PRN
Status: DISCONTINUED | OUTPATIENT
Start: 2024-06-05 | End: 2024-06-12 | Stop reason: HOSPADM

## 2024-06-05 RX ADMIN — FINASTERIDE 5 MG: 5 TABLET, FILM COATED ORAL at 08:17

## 2024-06-05 RX ADMIN — ATORVASTATIN CALCIUM 40 MG: 40 TABLET, FILM COATED ORAL at 08:16

## 2024-06-05 RX ADMIN — AMIODARONE HYDROCHLORIDE 200 MG: 200 TABLET ORAL at 08:17

## 2024-06-05 RX ADMIN — CHOLECALCIFEROL TAB 25 MCG (1000 UNIT) 2000 UNITS: 25 TAB at 08:17

## 2024-06-05 RX ADMIN — HYDRALAZINE HYDROCHLORIDE 25 MG: 25 TABLET, FILM COATED ORAL at 20:09

## 2024-06-05 RX ADMIN — DAPAGLIFLOZIN 5 MG: 5 TABLET, FILM COATED ORAL at 08:16

## 2024-06-05 RX ADMIN — DOCUSATE SODIUM 100 MG: 100 CAPSULE, LIQUID FILLED ORAL at 20:09

## 2024-06-05 RX ADMIN — WARFARIN SODIUM 5 MG: 5 TABLET ORAL at 17:22

## 2024-06-05 RX ADMIN — HYDRALAZINE HYDROCHLORIDE 25 MG: 25 TABLET, FILM COATED ORAL at 08:17

## 2024-06-05 RX ADMIN — ISOSORBIDE DINITRATE 20 MG: 10 TABLET ORAL at 08:17

## 2024-06-05 RX ADMIN — Medication 3 MG: at 20:09

## 2024-06-05 RX ADMIN — METOPROLOL SUCCINATE 50 MG: 50 TABLET, EXTENDED RELEASE ORAL at 08:17

## 2024-06-05 RX ADMIN — ALLOPURINOL 150 MG: 300 TABLET ORAL at 08:16

## 2024-06-05 RX ADMIN — TAMSULOSIN HYDROCHLORIDE 0.4 MG: 0.4 CAPSULE ORAL at 08:18

## 2024-06-05 RX ADMIN — DOCUSATE SODIUM 100 MG: 100 CAPSULE, LIQUID FILLED ORAL at 08:18

## 2024-06-05 ASSESSMENT — COGNITIVE AND FUNCTIONAL STATUS - GENERAL
MOVING TO AND FROM BED TO CHAIR: A LITTLE
TOILETING: A LITTLE
MOVING FROM LYING ON BACK TO SITTING ON SIDE OF FLAT BED WITH BEDRAILS: A LITTLE
DAILY ACTIVITIY SCORE: 22
WALKING IN HOSPITAL ROOM: A LITTLE
CLIMB 3 TO 5 STEPS WITH RAILING: A LOT
TURNING FROM BACK TO SIDE WHILE IN FLAT BAD: A LITTLE
STANDING UP FROM CHAIR USING ARMS: A LITTLE
MOBILITY SCORE: 17
HELP NEEDED FOR BATHING: A LITTLE

## 2024-06-05 ASSESSMENT — ACTIVITIES OF DAILY LIVING (ADL)
HOME_MANAGEMENT_TIME_ENTRY: 23
HOME_MANAGEMENT_TIME_ENTRY: 2

## 2024-06-05 ASSESSMENT — PAIN SCALES - GENERAL
PAINLEVEL_OUTOF10: 0 - NO PAIN
PAINLEVEL_OUTOF10: 0 - NO PAIN

## 2024-06-05 ASSESSMENT — PAIN - FUNCTIONAL ASSESSMENT: PAIN_FUNCTIONAL_ASSESSMENT: 0-10

## 2024-06-05 NOTE — PROGRESS NOTES
Occupational Therapy    OT Treatment    Patient Name: Cheko Jin  MRN: [id number]  Today's Date: 6/5/2024  Time Calculation  Start Time: 1130  Stop Time: 1153  Time Calculation (min): 23 min       Assessment:     Plan:  Treatment Interventions: ADL retraining, Functional transfer training  OT Frequency: 3 times per week  OT Discharge Recommendations:  (Due to patient cognitive impairment as per [place] Assessment this date, it is recommended pt may need supervison for adls and iadls for safe home going.)  OT - OK to Discharge:  (okay to discharge to next level of care pending medical team approval)  Treatment Interventions: ADL retraining, Functional transfer training    Subjective   Previous Visit Info:     General:  General  Patient Position Received: Bed, 2 rail up, Alarm on  General Comment: pt pleasant and cooperative, agreeable to complete [place] testing at bedside.      Objective    Cognition:  Cognition Test Scores  Cognition Tests:  ([place] test completed pt scored 11/30 indicating moderate cognitive impairement.Pt shows impairments with visospatial /exe functioning, memory, attention,lang, & orientation. Hard copy of assessm in pt chart.)    Outcome Measures:MoCA  Visuospatial/Executive: 1  Naming: 3  Memory (Score '0' as this is an Unscored Section): 0  Attention: Read List of Digits: 1  Attention: Read List of Letters: 1  Attention: Serial Sevens: 2  Language: Repeat: 0  Language: Fluency: 0  Abstraction: 2  Delayed Recall: 0  Orientation: 1  Add 1 Point if </=12 yr Education: 0  MOCA Total Score: 11    Education Documentation  No documentation found.  Education Comments  No comments found.        Goals:  Encounter Problems       Encounter Problems (Active)       impaired functional daily living skills       Pt will increase Grooming to s/mod indep (sink level) Upper Body Bathing to s/mod indep  and Lower Body Bathing  to s/mod indep  increase Upper Body Dressing  to s/mod indep  and LE Dressing to s/mod  indep with/ without adaptive equipment as needed (Progressing)       Start:  06/03/24    Expected End:  06/17/24            Pt will increase Functional Transfers Bed Mobility to mod indep/indep  Sit to Stand  to mod indep/indep Functional Mobility  with /without a device to s/mod indep/indep  chair/toliet/room to increase indep/safety in patients discharge environment (Progressing)       Start:  06/03/24    Expected End:  06/17/24            Pt will increase  energy conservation /work simplification/diaphragmatic breathing /cardiac/back precautions in activities of adl/ home mgnt to s/indep assistance to increase independence and safety  within  the patient's discharge environment  (Progressing)       Start:  06/03/24    Expected End:  06/17/24

## 2024-06-05 NOTE — PROGRESS NOTES
Physical Therapy    Physical Therapy Treatment    Patient Name: Cheko Jin  MRN: [id number]  Today's Date: 6/5/2024  Time Calculation  Start Time: 0854  Stop Time: 0918  Time Calculation (min): 24 min    Assessment/Plan   PT Assessment  End of Session Communication: Bedside nurse  End of Session Patient Position: Up in chair, Alarm on (call light and needs within reach.)     PT Plan  Treatment/Interventions: Bed mobility, Transfer training, Gait training, Stair training, Balance training, Strengthening, Endurance training, Therapeutic exercise, Therapeutic activity  PT Plan: Skilled PT  PT Frequency: 5 times per week  PT Discharge Recommendations: Moderate intensity level of continued care      General Visit Information:   PT  Visit  PT Received On: 06/05/24  General  Prior to Session Communication: Bedside nurse  Patient Position Received: Bed, 2 rail up, Alarm on  General Comment: Patient pleasant and agreeable to therapy.    Subjective   Precautions:  Precautions  Medical Precautions: Fall precautions, Cardiac precautions (lifevest)  Vital Signs:       Objective   Treatments:  Therapeutic Exercise  Therapeutic Exercise Performed: Yes  Therapeutic Exercise Activity 1: Patient performed seated therex, BLE: APs, hip flexion, LAQ, heel slides, and hip abd/add all [id number]    Bed Mobility  Bed Mobility: Yes  Bed Mobility 1  Bed Mobility 1: Supine to sitting  Level of Assistance 1: Close supervision    Ambulation/Gait Training  Ambulation/Gait Training Performed: Yes  Ambulation/Gait Training 1  Comments/Distance (ft) 1: Patient ambulated ~40ft and ~50ft with FWW and ~40ft without AD. All trials with CGA to SBA x1. Patient demo'd step through gait with decreased velocity and flexed posture. Steady with no LOB noted.  Transfers  Transfer: Yes  Transfer 1  Trials/Comments 1: Patient performed sit<>stand with SBA x1    Outcome Measures:  [facility] Basic Mobility  Turning from your back to your side while in a flat bed  without using bedrails: A little  Moving from lying on your back to sitting on the side of a flat bed without using bedrails: A little  Moving to and from bed to chair (including a wheelchair): A little  Standing up from a chair using your arms (e.g. wheelchair or bedside chair): A little  To walk in hospital room: A little  Climbing 3-5 steps with railing: A lot  Basic Mobility - Total Score: 17    Education Documentation  Mobility Training, taught by Vaishali Barahona PTA at 6/5/2024 11:43 AM.  Learner: Patient  Readiness: Acceptance  Method: Explanation  Response: Verbalizes Understanding  Comment: Educated patient on proper sequencing and pacing when ambulating.    Education Comments  No comments found.        OP EDUCATION:       Encounter Problems       Encounter Problems (Active)       PT Problem       Pt. will perform bed mobility independently (Progressing)       Start:  06/03/24    Expected End:  06/17/24            Pt. will txfr independently (Progressing)       Start:  06/03/24    Expected End:  06/17/24            Pt. will ambulate >=150ft. with lrd prn independently (Progressing)       Start:  06/03/24    Expected End:  06/17/24            Pt. will perform leonidas le therex x >=20reps for increased fxl strength/endurance (Progressing)       Start:  06/03/24    Expected End:  06/17/24            Pt. will demonstrate >=G dyn stand balance with lrd prn (Progressing)       Start:  06/03/24    Expected End:  06/17/24               Pain - Adult

## 2024-06-05 NOTE — CONSULTS
Psych Consult      Consult Requested By: Germán Mills    Reason for Consultation:  capacity    HISTORY OF PRESENT ILLNESS    Per ED - Cheko Jin is a 78 y.o. male who presents to the hospital with complaints of trauma.  Patient has physical signs symptoms of trauma but cannot tell us what happened.  He was originally sent to an urgent care by his, I believe, significant other and then sent to the emergency department for further evaluation.  Patient is quite confused.  Most of this history is obtained by chart review.  Patient was admitted to a [facility] facility with atrial flutter with RVR.  He underwent GLEN guided cardioversion on 5/8/2024 which successfully converted into normal sinus rhythm.  His ejection fraction was noted to be extremely reduced at about 10%.  He was also noted to have some nonsustained runs of V. tach.  He underwent left heart cath without significant disease.  Cardiology optimized his medications and patient was discharged to a skilled nursing facility with a LifeVest and a Lovenox bridge to Coumadin due to inability to afford a DOAC. He went to a [facility] SNF for a few days and was discharged home.  Seems like he was not taught how to inject the Lovenox for bridging and did not know how to wear the LifeVest.  He arrived unannounced at his PCPs office in the left due to wait time.  APS was contacted as were his emergency contacts.  In subsequent visit his PCP has filed for guardianship and filled out a statement of expert evaluation for APS declining patient to lack capacity.     Emergency department workup showed a left gluteal intramuscular hemorrhage.  An age-indeterminate comminuted nondisplaced fracture involving the spinal process of C5.  The emergency department did speak to one of the patient's surrogate decision makers who is in [facility].  I am told they are on the way have expressed their concerns over his independence.     HPI:  Pt is a 78 yr old CM being seen for capacity    On  "eval pt is calm and cooperative. Laying in bed  Pt states that he lives in [place] in an Apt. Has a gf that lives \"within walking distance\"  Pt has never been , does not have any children. Owned an insurance company.  Denies drug, etoh or tobacco use.  Denies any psychiatric history    Pt is unsure why he is in the hospital. Denies having any issues with his heart and does not recall being at a facility for rehab.  Pt is very unreliable historian  Pt speech is clear, thoughts are confused  Denies SI, no plan or intent to harm self.  Pt denies AVH, does not appear internally stimulated at this time.  Pt states that he is having a difficult time sleeping in the hospital. Denies any changes in appetite    Per chart review guardianship process has been started by his PCP and SEE was filed out.    Pt did have  MOCA completed here at [facility] and scored 11/30  Visuospatial/Executive: 1  Naming: 3  Memory (Score '0' as this is an Unscored Section): 0  Attention: Read List of Digits: 1  Attention: Read List of Letters: 1  Attention: Serial Sevens: 2  Language: Repeat: 0  Language: Fluency: 0  Abstraction: 2  Delayed Recall: 0  Orientation: 1  Add 1 Point if </=12 yr Education: 0  MOCA Total Score: 11      Per nursing pt has not had any behavioral issues, no acute agitation    PSYCHIATRIC REVIEW OF SYSTEMS  SI: Denies    Depression: Denies    Evette: Denies    Panic: Denies    Generalized Anxiety: Denies    PTSD: Denies    OCD: Denies    Psychosis: Denies    Eating Disorder: Denies    Current Outpatient Medications   Medication Instructions    allopurinol (ZYLOPRIM) 150 mg, oral, Daily    amiodarone (PACERONE) 200 mg, oral, Daily    atorvastatin (LIPITOR) 10 mg, oral, Daily    cholecalciferol (VITAMIN D-3) 50 mcg, oral, Daily    dapagliflozin propanediol (FARXIGA) 5 mg, oral, Daily    finasteride (PROSCAR) 5 mg, oral, Daily    furosemide (LASIX) 40 mg, oral, Daily    hydrALAZINE (APRESOLINE) 25 mg, oral, 2 times daily "    isosorbide dinitrate (ISORDIL) 20 mg, oral, 2 times daily    metoprolol succinate XL (TOPROL-XL) 50 mg, oral, Daily    multivitamin with minerals tablet 1 tablet, oral, Daily    tamsulosin (FLOMAX) 0.4 mg, oral, Daily    warfarin (COUMADIN) 5 mg, oral, Daily, Or as directed        OARRS:   090    Past Medical History:   Diagnosis Date    Alzheimer's dementia (Multi)     Atrial flutter (Multi)     CAD (coronary artery disease)     nonobstructive    CKD (chronic kidney disease)     Dilated cardiomyopathy (Multi)     Gout     HFrEF (heart failure with reduced ejection fraction) (Multi)     Hypertension     Malignant neoplasm of bladder, unspecified (Multi)     NSVT (nonsustained ventricular tachycardia) (Multi)     Lifevest placed    Sleep apnea     Stroke (cerebrum) (Multi)     3 strokes R frontal, parietal lobes (July, 2010, September, 2010 and October, 2010), etiology embolic due to 40-50% ulcerated R ICA plaque s/p R CEA 10/12/10        Past Surgical History:   Procedure Laterality Date    HERNIA REPAIR Bilateral     TRANSURETHRAL RESECTION OF PROSTATE          Family History   Problem Relation Name Age of Onset    No Known Problems Mother      No Known Problems Father          Social History     Substance and Sexual Activity   Alcohol Use Not Currently        Social History     Substance and Sexual Activity   Drug Use Defer        Social History     Tobacco Use   Smoking Status Never    Passive exposure: Never   Smokeless Tobacco Never        MENTAL STATUS EXAM  Physical Exam  Psychiatric:         Attention and Perception: Attention and perception normal.         Mood and Affect: Mood and affect normal.         Speech: Speech normal.         Behavior: Behavior normal. Behavior is cooperative.         Thought Content: Thought content normal.         Cognition and Memory: Cognition is impaired. Memory is impaired. He exhibits impaired recent memory and impaired remote memory.         Judgment: Judgment is  impulsive and inappropriate.         Vitals:    06/04/24 1601 06/04/24 1957 06/05/24 0429 06/05/24 0759   BP: 110/60 108/56 107/57 125/59   BP Location:  Right arm Right arm    Patient Position:  Lying Lying    Pulse: 55 52 59 56   Resp:  18 18 18   Temp: 36.1 °C (97 °F) 36.7 °C (98.1 °F) 36.9 °C (98.4 °F) 36.9 °C (98.4 °F)   TempSrc:  Temporal Temporal Temporal   SpO2: 92% 94% 92% 94%   Weight:       Height:            Recent Results (from the past 72 hour(s))   Protime-INR    Collection Time: 06/03/24  5:19 AM   Result Value Ref Range    Protime 15.0 (H) 9.8 - 12.8 seconds    INR 1.3 (H) 0.9 - 1.1   Basic Metabolic Panel    Collection Time: 06/03/24  5:19 AM   Result Value Ref Range    Glucose 101 (H) 74 - 99 mg/dL    Sodium 137 136 - 145 mmol/L    Potassium 3.6 3.5 - 5.3 mmol/L    Chloride 102 98 - 107 mmol/L    Bicarbonate 27 21 - 32 mmol/L    Anion Gap 12 10 - 20 mmol/L    Urea Nitrogen 65 (H) 6 - 23 mg/dL    Creatinine 2.43 (H) 0.50 - 1.30 mg/dL    eGFR 27 (L) >60 mL/min/1.73m*2    Calcium 8.5 (L) 8.6 - 10.3 mg/dL   Basic Metabolic Panel    Collection Time: 06/04/24  5:38 AM   Result Value Ref Range    Glucose 96 74 - 99 mg/dL    Sodium 137 136 - 145 mmol/L    Potassium 3.6 3.5 - 5.3 mmol/L    Chloride 104 98 - 107 mmol/L    Bicarbonate 25 21 - 32 mmol/L    Anion Gap 12 10 - 20 mmol/L    Urea Nitrogen 52 (H) 6 - 23 mg/dL    Creatinine 2.07 (H) 0.50 - 1.30 mg/dL    eGFR 32 (L) >60 mL/min/1.73m*2    Calcium 8.5 (L) 8.6 - 10.3 mg/dL   Lavender Top    Collection Time: 06/04/24  5:38 AM   Result Value Ref Range    Extra Tube Hold for add-ons.    Protime-INR    Collection Time: 06/04/24  5:39 AM   Result Value Ref Range    Protime 13.5 (H) 9.8 - 12.8 seconds    INR 1.2 (H) 0.9 - 1.1   Protime-INR    Collection Time: 06/05/24  5:07 AM   Result Value Ref Range    Protime 13.2 (H) 9.8 - 12.8 seconds    INR 1.2 (H) 0.9 - 1.1   Lavender Top    Collection Time: 06/05/24  7:02 AM   Result Value Ref Range    Extra Tube Hold  for add-ons.    SST TOP    Collection Time: 06/05/24  7:02 AM   Result Value Ref Range    Extra Tube Hold for add-ons.         No results found.     ASSESSMENT AND PLAN  DX: Dementia    PLAN: Pt currently lacks capacity for medical decision making  Ativan PO/IV PRN for anxiety/agitation  Schedule melatonin 3mg HS    Thank you for allowing us to participate in the care of this patient. We will continue to follow-up with you. .    I spent 60 minutes in the professional and overall care of this patient.      Olaf Sullivan, APRN-CNP

## 2024-06-05 NOTE — PROGRESS NOTES
"Cheko Jin is a 78 y.o. male on day 4 of admission presenting with Hyponatremia.    Subjective   Pt has no new complaints.  I discussed with him that we are working on discharge planning for him with his significant other's daughter Arcelia.       Objective     Physical Exam    GEN:  awake, not in acute distress  HEENT:  laceration and skin excoriated top of head, PERRL, EOM intact, nares patent  Cardio:  RRR, no murmurs  Resp:  CTAB, no wheezing  Abd:  +BS, soft, nontender  :  deferred  Neuro:  alert and oriented, CN2-12 grossly intact, no focal deficits  Msk:  no gross deformities, no joint effusions  Skin:  warm, dry, intact  Psych:  appropriate in mood and behavior      Last Recorded Vitals  Blood pressure 125/59, pulse 56, temperature 36.9 °C (98.4 °F), temperature source Temporal, resp. rate 18, height 1.753 m (5' 9.02\"), weight 79.4 kg (175 lb 0.7 oz), SpO2 94%.  Intake/Output last 3 Shifts:  I/O last 3 completed shifts:  In: - (0 mL/kg)   Out: 1650 (20.8 mL/kg) [Urine:1650 (0.6 mL/kg/hr)]  Weight: 79.4 kg     Relevant Results              Scheduled medications  allopurinol, 150 mg, oral, Daily  amiodarone, 200 mg, oral, Daily  atorvastatin, 40 mg, oral, Daily  cholecalciferol, 2,000 Units, oral, Daily  dapagliflozin propanediol, 5 mg, oral, Daily  docusate sodium, 100 mg, oral, BID  finasteride, 5 mg, oral, Daily  [Held by provider] furosemide, 40 mg, oral, Daily  hydrALAZINE, 25 mg, oral, BID  isosorbide dinitrate, 20 mg, oral, Daily  metoprolol succinate XL, 50 mg, oral, Daily  tamsulosin, 0.4 mg, oral, Daily  warfarin, 5 mg, oral, Daily      Continuous medications     PRN medications  PRN medications: acetaminophen **OR** [DISCONTINUED] acetaminophen **OR** [DISCONTINUED] acetaminophen, melatonin          Assessment/Plan   Principal Problem:    Hyponatremia    # Hyponatremia, resolved  # Mechanical fall  # Left gluteus dina IM hemorrhage  # Basilar artery calcification vs thrombus  # " Age-indeterminate C5 spinous process fracture  # STUART on CKD  # A-flutter on warfarin  # HFrEF, dilated cardiomyopathy  # Nonobstructive CAD  # h/o strokes  # Advanced Alzheimer dementia    Hyponatremia resolved.  Vital signs stable, H/H stable.  Warfarin restarted.  TCC and SW working on guardianship, patient case is active with APS worker.             I spent 20 minutes in the professional and overall care of this patient.      Germán Mills, DO

## 2024-06-05 NOTE — CARE PLAN
The patient's goals for the shift include sleep    The clinical goals for the shift include pt to remain free from injury      Problem: Pain  Goal: My pain/discomfort is manageable  Outcome: Progressing     Problem: Safety  Goal: Patient will be injury free during hospitalization  Outcome: Progressing  Goal: I will remain free of falls  Outcome: Progressing     Problem: Daily Care  Goal: Daily care needs are met  Outcome: Progressing     Problem: Psychosocial Needs  Goal: Demonstrates ability to cope with hospitalization/illness  Outcome: Progressing  Goal: Collaborate with me, my family, and caregiver to identify my specific goals  Outcome: Progressing     Problem: Discharge Barriers  Goal: My discharge needs are met  Outcome: Progressing     Problem: Skin  Goal: Prevent/manage excess moisture  Outcome: Progressing  Goal: Prevent/minimize sheer/friction injuries  Outcome: Progressing  Goal: Promote skin healing  Outcome: Progressing     Problem: Fall/Injury  Goal: Not fall by end of shift  Outcome: Progressing  Goal: Be free from injury by end of the shift  Outcome: Progressing  Goal: Verbalize understanding of personal risk factors for fall in the hospital  Outcome: Progressing  Goal: Verbalize understanding of risk factor reduction measures to prevent injury from fall in the home  Outcome: Progressing  Goal: Use assistive devices by end of the shift  Outcome: Progressing  Goal: Pace activities to prevent fatigue by end of the shift  Outcome: Progressing     Problem: Pain - Adult  Goal: Verbalizes/displays adequate comfort level or baseline comfort level  Outcome: Progressing     Problem: Safety - Adult  Goal: Free from fall injury  Outcome: Progressing     Problem: Discharge Planning  Goal: Discharge to home or other facility with appropriate resources  Outcome: Progressing     Problem: Chronic Conditions and Co-morbidities  Goal: Patient's chronic conditions and co-morbidity symptoms are monitored and maintained  or improved  Outcome: Progressing

## 2024-06-06 LAB
ANION GAP SERPL CALC-SCNC: 9 MMOL/L (ref 10–20)
BUN SERPL-MCNC: 46 MG/DL (ref 6–23)
CALCIUM SERPL-MCNC: 8.7 MG/DL (ref 8.6–10.3)
CHLORIDE SERPL-SCNC: 107 MMOL/L (ref 98–107)
CO2 SERPL-SCNC: 26 MMOL/L (ref 21–32)
CREAT SERPL-MCNC: 2 MG/DL (ref 0.5–1.3)
EGFRCR SERPLBLD CKD-EPI 2021: 34 ML/MIN/1.73M*2
ERYTHROCYTE [DISTWIDTH] IN BLOOD BY AUTOMATED COUNT: 13.5 % (ref 11.5–14.5)
GLUCOSE SERPL-MCNC: 97 MG/DL (ref 74–99)
HCT VFR BLD AUTO: 36.9 % (ref 41–52)
HGB BLD-MCNC: 12.1 G/DL (ref 13.5–17.5)
HOLD SPECIMEN: NORMAL
INR PPP: 1.2 (ref 0.9–1.1)
MCH RBC QN AUTO: 30.6 PG (ref 26–34)
MCHC RBC AUTO-ENTMCNC: 32.8 G/DL (ref 32–36)
MCV RBC AUTO: 93 FL (ref 80–100)
NRBC BLD-RTO: 0 /100 WBCS (ref 0–0)
PLATELET # BLD AUTO: 143 X10*3/UL (ref 150–450)
POTASSIUM SERPL-SCNC: 4 MMOL/L (ref 3.5–5.3)
PROTHROMBIN TIME: 13.4 SECONDS (ref 9.8–12.8)
RBC # BLD AUTO: 3.96 X10*6/UL (ref 4.5–5.9)
SODIUM SERPL-SCNC: 138 MMOL/L (ref 136–145)
WBC # BLD AUTO: 5.4 X10*3/UL (ref 4.4–11.3)

## 2024-06-06 PROCEDURE — 2500000002 HC RX 250 W HCPCS SELF ADMINISTERED DRUGS (ALT 637 FOR MEDICARE OP, ALT 636 FOR OP/ED): Performed by: INTERNAL MEDICINE

## 2024-06-06 PROCEDURE — 1200000002 HC GENERAL ROOM WITH TELEMETRY DAILY

## 2024-06-06 PROCEDURE — 85027 COMPLETE CBC AUTOMATED: CPT | Performed by: INTERNAL MEDICINE

## 2024-06-06 PROCEDURE — 36415 COLL VENOUS BLD VENIPUNCTURE: CPT | Performed by: INTERNAL MEDICINE

## 2024-06-06 PROCEDURE — 97116 GAIT TRAINING THERAPY: CPT | Mod: GP,CQ

## 2024-06-06 PROCEDURE — 80048 BASIC METABOLIC PNL TOTAL CA: CPT | Performed by: INTERNAL MEDICINE

## 2024-06-06 PROCEDURE — 2500000001 HC RX 250 WO HCPCS SELF ADMINISTERED DRUGS (ALT 637 FOR MEDICARE OP): Performed by: INTERNAL MEDICINE

## 2024-06-06 PROCEDURE — 97110 THERAPEUTIC EXERCISES: CPT | Mod: GP,CQ

## 2024-06-06 PROCEDURE — 2500000001 HC RX 250 WO HCPCS SELF ADMINISTERED DRUGS (ALT 637 FOR MEDICARE OP): Performed by: NURSE PRACTITIONER

## 2024-06-06 PROCEDURE — 85610 PROTHROMBIN TIME: CPT | Performed by: INTERNAL MEDICINE

## 2024-06-06 PROCEDURE — 99231 SBSQ HOSP IP/OBS SF/LOW 25: CPT | Performed by: INTERNAL MEDICINE

## 2024-06-06 PROCEDURE — 2500000002 HC RX 250 W HCPCS SELF ADMINISTERED DRUGS (ALT 637 FOR MEDICARE OP, ALT 636 FOR OP/ED): Mod: MUE | Performed by: INTERNAL MEDICINE

## 2024-06-06 RX ADMIN — ISOSORBIDE DINITRATE 20 MG: 10 TABLET ORAL at 08:58

## 2024-06-06 RX ADMIN — FINASTERIDE 5 MG: 5 TABLET, FILM COATED ORAL at 08:57

## 2024-06-06 RX ADMIN — DAPAGLIFLOZIN 5 MG: 5 TABLET, FILM COATED ORAL at 08:58

## 2024-06-06 RX ADMIN — TAMSULOSIN HYDROCHLORIDE 0.4 MG: 0.4 CAPSULE ORAL at 08:57

## 2024-06-06 RX ADMIN — CHOLECALCIFEROL TAB 25 MCG (1000 UNIT) 2000 UNITS: 25 TAB at 08:57

## 2024-06-06 RX ADMIN — DOCUSATE SODIUM 100 MG: 100 CAPSULE, LIQUID FILLED ORAL at 08:57

## 2024-06-06 RX ADMIN — ATORVASTATIN CALCIUM 40 MG: 40 TABLET, FILM COATED ORAL at 08:57

## 2024-06-06 RX ADMIN — Medication 3 MG: at 20:13

## 2024-06-06 RX ADMIN — WARFARIN SODIUM 7.5 MG: 5 TABLET ORAL at 17:56

## 2024-06-06 RX ADMIN — AMIODARONE HYDROCHLORIDE 200 MG: 200 TABLET ORAL at 08:58

## 2024-06-06 RX ADMIN — ALLOPURINOL 150 MG: 300 TABLET ORAL at 08:58

## 2024-06-06 ASSESSMENT — COGNITIVE AND FUNCTIONAL STATUS - GENERAL
WALKING IN HOSPITAL ROOM: A LITTLE
MOVING TO AND FROM BED TO CHAIR: A LITTLE
STANDING UP FROM CHAIR USING ARMS: A LITTLE
MOBILITY SCORE: 19
CLIMB 3 TO 5 STEPS WITH RAILING: A LOT

## 2024-06-06 ASSESSMENT — PAIN SCALES - GENERAL
PAINLEVEL_OUTOF10: 0 - NO PAIN

## 2024-06-06 ASSESSMENT — PAIN - FUNCTIONAL ASSESSMENT: PAIN_FUNCTIONAL_ASSESSMENT: 0-10

## 2024-06-06 NOTE — PROGRESS NOTES
Physical Therapy    Physical Therapy Treatment    Patient Name: Cheko Jin  MRN: [id number]  Today's Date: 6/6/2024  Time Calculation  Start Time: 1300  Stop Time: 1323  Time Calculation (min): 23 min    Assessment/Plan   PT Assessment  End of Session Patient Position: Bed, 2 rail up     PT Plan  Treatment/Interventions: Bed mobility, Transfer training, Gait training, Stair training, Balance training, Strengthening, Endurance training, Therapeutic exercise, Therapeutic activity  PT Plan: Skilled PT  PT Frequency: 5 times per week  PT Discharge Recommendations: Moderate intensity level of continued care      General Visit Information:   PT  Visit  PT Received On: 06/06/24  General  Prior to Session Communication: Bedside nurse  Patient Position Received: Bed, 2 rail up  General Comment:  (pt agreeable to participate in therapy session)    Subjective   Precautions:  Precautions  Medical Precautions: Fall precautions, Cardiac precautions  Precautions Comment:  (life vest)       Objective   Pain:  Pain Assessment  Pain Assessment: 0-10  Pain Score: 0 - No pain     Treatments:  Therapeutic Exercise  Therapeutic Exercise Performed:  (seated BLE AP, GS, LAQ, marching, hip AB, and hip AD with pillow)    Bed Mobility  Bed Mobility:  (sup <> sit independent)    Ambulation/Gait Training  Ambulation/Gait Training Performed:  (pt ambulates approx. 75 ft x 2 with no AD and min A x 1.  Somewhat unsteady and almost staggering at times though no episodes LOB.)    Transfers  Transfer:  (sit <> stand x 3 trials with SBA)    Outcome Measures:  [facility] Basic Mobility  Turning from your back to your side while in a flat bed without using bedrails: None  Moving from lying on your back to sitting on the side of a flat bed without using bedrails: None  Moving to and from bed to chair (including a wheelchair): A little  Standing up from a chair using your arms (e.g. wheelchair or bedside chair): A little  To walk in hospital room: A  little  Climbing 3-5 steps with railing: A lot  Basic Mobility - Total Score: 19    Education Documentation  Mobility Training, taught by Soila Nicole PTA at 6/6/2024  3:33 PM.  Learner: Patient  Readiness: Acceptance  Method: Explanation  Response: Verbalizes Understanding    Education Comments  No comments found.        EDUCATION:       Encounter Problems       Encounter Problems (Active)       PT Problem       Pt. will perform bed mobility independently (Met)       Start:  06/03/24    Expected End:  06/17/24    Resolved:  06/06/24         Pt. will txfr independently (Progressing)       Start:  06/03/24    Expected End:  06/17/24            Pt. will ambulate >=150ft. with lrd prn independently (Progressing)       Start:  06/03/24    Expected End:  06/17/24            Pt. will perform leonidas le therex x >=20reps for increased fxl strength/endurance (Progressing)       Start:  06/03/24    Expected End:  06/17/24            Pt. will demonstrate >=G dyn stand balance with lrd prn (Progressing)       Start:  06/03/24    Expected End:  06/17/24

## 2024-06-06 NOTE — PROGRESS NOTES
Met with pt. Introduced myself & apprised him of SW role. Discussed possible SNF placement as per therapies recommendation. Pt states that he does not need a placement, indicates that he plans on returning to his apt at discharge. He indicates that he does not need assistance in his apt. But he notes that his DPOA is his friend's dtr., Suri & she can help him.   Psych CNP has assesses pt & is noting that pt lacks capacity.  Call placed to APS worker, Ms. De La Rosa, [phone]. Inquired if APS has gotten Statement of Expert Eval from pt's attending. She notes that she believes that they have rec'd SEE from pt's attending. Asked if they planned on pursuing guardianship, as pt appears to need a secure placement. She notes that we won;t need guardianship, as pt has agreed to placement. Apprised her that I have spoken with pt & he refuses placement, noting that he plans on returning home. She indicates that she will follow up with her supervisor about status of case.   Apprised TCC & Ms. Pelletier of status.   Call placed to DPOA, Ms. Nettles, with message left. Requested return call & copy of DPOA. . Leona Hamilton, FLORESITA

## 2024-06-06 NOTE — CARE PLAN
The patient's goals for the shift include safety    The clinical goals for the shift include maintain safety    Problem: Pain  Goal: My pain/discomfort is manageable  Outcome: Progressing     Problem: Safety  Goal: Patient will be injury free during hospitalization  Outcome: Progressing  Goal: I will remain free of falls  Outcome: Progressing     Problem: Daily Care  Goal: Daily care needs are met  Outcome: Progressing     Problem: Psychosocial Needs  Goal: Demonstrates ability to cope with hospitalization/illness  Outcome: Progressing  Goal: Collaborate with me, my family, and caregiver to identify my specific goals  Outcome: Progressing

## 2024-06-06 NOTE — PROGRESS NOTES
"Cheko Jin is a 78 y.o. male on day 5 of admission presenting with Hyponatremia.    Subjective   Pt comfortable in bed reading the newspaper, no new complaints.       Objective     Physical Exam    GEN:  awake, not in acute distress  HEENT:  laceration and skin excoriated top of head, PERRL, EOM intact, nares patent  Cardio:  RRR, no murmurs  Resp:  CTAB, no wheezing  Abd:  +BS, soft, nontender  :  deferred  Neuro:  alert and oriented, CN2-12 grossly intact, no focal deficits  Msk:  no gross deformities, no joint effusions  Skin:  warm, dry, intact  Psych:  pleasant, forgetful    Last Recorded Vitals  Blood pressure 133/64, pulse 59, temperature 36.7 °C (98.1 °F), temperature source Temporal, resp. rate 16, height 1.753 m (5' 9.02\"), weight 79.4 kg (175 lb 0.7 oz), SpO2 94%.  Intake/Output last 3 Shifts:  I/O last 3 completed shifts:  In: 100 (1.3 mL/kg) [P.O.:100]  Out: 900 (11.3 mL/kg) [Urine:900 (0.3 mL/kg/hr)]  Weight: 79.4 kg     Relevant Results              No results found.   Results for orders placed or performed during the hospital encounter of 06/01/24 (from the past 24 hour(s))   SST TOP   Result Value Ref Range    Extra Tube Hold for add-ons.    CBC   Result Value Ref Range    WBC 5.4 4.4 - 11.3 x10*3/uL    nRBC 0.0 0.0 - 0.0 /100 WBCs    RBC 3.96 (L) 4.50 - 5.90 x10*6/uL    Hemoglobin 12.1 (L) 13.5 - 17.5 g/dL    Hematocrit 36.9 (L) 41.0 - 52.0 %    MCV 93 80 - 100 fL    MCH 30.6 26.0 - 34.0 pg    MCHC 32.8 32.0 - 36.0 g/dL    RDW 13.5 11.5 - 14.5 %    Platelets 143 (L) 150 - 450 x10*3/uL   Basic Metabolic Panel   Result Value Ref Range    Glucose 97 74 - 99 mg/dL    Sodium 138 136 - 145 mmol/L    Potassium 4.0 3.5 - 5.3 mmol/L    Chloride 107 98 - 107 mmol/L    Bicarbonate 26 21 - 32 mmol/L    Anion Gap 9 (L) 10 - 20 mmol/L    Urea Nitrogen 46 (H) 6 - 23 mg/dL    Creatinine 2.00 (H) 0.50 - 1.30 mg/dL    eGFR 34 (L) >60 mL/min/1.73m*2    Calcium 8.7 8.6 - 10.3 mg/dL   Protime-INR   Result Value " Ref Range    Protime 13.4 (H) 9.8 - 12.8 seconds    INR 1.2 (H) 0.9 - 1.1              Assessment/Plan   Principal Problem:    Hyponatremia    # Hyponatremia, resolved  # Mechanical fall  # Left gluteus dina IM hemorrhage  # Basilar artery calcification vs thrombus  # Age-indeterminate C5 spinous process fracture  # STUART on CKD  # A-flutter on warfarin  # HFrEF, dilated cardiomyopathy  # Nonobstructive CAD  # h/o strokes  # Advanced Alzheimer dementia    Hyponatremia resolved.  Vital signs stable, H/H stable.    INR low, increased dose of warfarin, recheck INR.  Psych note reviewed.  Patient lacks capacity for decision making.  TCC and SW working on guardianship, patient case is active with APS worker.             I spent 20 minutes in the professional and overall care of this patient.      Germán Mills, DO

## 2024-06-06 NOTE — PROGRESS NOTES
"Nutrition Follow Up Assessment:     Nutrition History:  Energy Intake: Good > 75 % (per pt)  Food and Nutrient History: Pt laying in bed at time of visit today.  Pt reports he is eating 100% of meals, however not recorded on flow sheet.  Vitamin/Herbal Supplement Use: vitamin D3  Food Allergies/Intolerances:  None  GI Symptoms: None  Oral Problems: None       Anthropometrics:  Height: 175.3 cm (5' 9.02\")   Weight: 79.4 kg (175 lb 0.7 oz)   BMI (Calculated): 25.84  IBW/kg (Dietitian Calculated): 72.7 kg  Percent of IBW: 109 %       Weight History:     Weight Change %:   No new weight to assess     Nutrition Focused Physical Exam Findings:    Subcutaneous Fat Loss:   Orbital Fat Pads: Well nourished (slightly bulging fat pads)  Buccal Fat Pads: Well nourished (full, rounded cheeks)  Triceps: Well nourished (ample fat tissue)  Ribs: Defer  Muscle Wasting:  Temporalis: Well nourished (well-defined muscle)  Pectoralis (Clavicular Region): Well nourished (clavicle not visible)  Deltoid/Trapezius: Well nourished (rounded appearance at arm, shoulder, neck)  Interosseous: Defer  Trapezius/Infraspinatus/Supraspinatus (Scapular Region): Defer  Quadriceps: Defer  Gastrocnemius: Defer  Edema:  Edema: none  Physical Findings:  Skin: Positive (head)    Nutrition Significant Labs:  BMP Trend:   Results from last 7 days   Lab Units 06/06/24  0525 06/04/24  0538 06/03/24  0519 06/02/24  0517   GLUCOSE mg/dL 97 96 101* 104*   CALCIUM mg/dL 8.7 8.5* 8.5* 8.5*   SODIUM mmol/L 138 137 137 136   POTASSIUM mmol/L 4.0 3.6 3.6 3.7   CO2 mmol/L 26 25 27 25   CHLORIDE mmol/L 107 104 102 101   BUN mg/dL 46* 52* 65* 63*   CREATININE mg/dL 2.00* 2.07* 2.43* 2.73*        Nutrition Specific Medications:  Reviewed     I/O: last BM 6/4   ;      Dietary Orders (From admission, onward)       Start     Ordered    06/03/24 1331  Oral nutritional supplements  Until discontinued        Question Answer Comment   Deliver with Dinner    Select supplement: " Griselda Gonzalez        06/03/24 1330    06/01/24 2201  Adult diet Regular  Diet effective now        Question:  Diet type  Answer:  Regular    06/01/24 2200                     Estimated Needs:      Method for Estimating Needs: 1600-1750kcals (20-22kcals/kg ABW)     Method for Estimating Needs: 63-79g (0.8-1.0g/kg ABW) as renal function permits     Method for Estimating Needs: 1 mL/kcal or as per MD        Nutrition Diagnosis   Malnutrition Diagnosis  Patient has Malnutrition Diagnosis: No    Nutrition Diagnosis  Patient has Nutrition Diagnosis: Yes  Diagnosis Status (1): Ongoing  Nutrition Diagnosis 1: Increased nutrient needs  Related to (1): physiological causes increasing nutrient needs  As Evidenced by (1): scalp abrasion, UTI       Nutrition Interventions/Recommendations         Nutrition Prescription:  Individualized Nutrition Prescription Provided for : Regular diet as ordered.  Mighty shake once daily        Nutrition Interventions:   Interventions: Meals and snacks, Medical food supplement  Meals and Snacks: General healthful diet  Goal: consume >75% of meals--met  Medical Food Supplement: Commercial beverage  Goal: consume >75% of Mighty shake (for an additional 220 kcals, 6 gm protein each) -met/ongoing    Collaboration and Referral of Nutrition Care:  (spoke with patient)    Nutrition Education:   N/A       Nutrition Monitoring and Evaluation   Food/Nutrient Related History Monitoring  Monitoring and Evaluation Plan: Energy intake, Fluid intake, Amount of food  Energy Intake: Estimated energy intake  Criteria: Meal/ONS intake to meet >75% of estimated needs  Fluid Intake: Estimated fluid intake  Criteria: fluid intake to meet >75% of estimated need  Amount of Food: Estimated amout of food, Medical food intake  Criteria: Pt to consume >75% of meals/ONS    Body Composition/Growth/Weight History  Monitoring and Evaluation Plan: Weight  Weight: Measured weight  Criteria: reweigh at least every 5  days    Biochemical Data, Medical Tests and Procedures  Monitoring and Evaluation Plan: Electrolyte/renal panel  Criteria: labs WNL    Nutrition Focused Physical Findings  Monitoring and Evaluation Plan: Skin  Criteria: promote healing and maintenance of skin integrity       Time Spent/Follow-up Reminder:   Time Spent (min): 30 minutes  Last Date of Nutrition Visit: 06/06/24  Nutrition Follow-Up Needed?: 3-5 days  Follow up Comment: 6/11 TG

## 2024-06-06 NOTE — CARE PLAN
The patient's goals for the shift include safety    The clinical goals for the shift include maintain safety      Problem: Pain  Goal: My pain/discomfort is manageable  Outcome: Progressing     Problem: Safety  Goal: Patient will be injury free during hospitalization  Outcome: Progressing  Goal: I will remain free of falls  Outcome: Progressing     Problem: Skin  Goal: Prevent/manage excess moisture  Outcome: Progressing  Flowsheets (Taken 6/6/2024 1007)  Prevent/manage excess moisture: Moisturize dry skin  Goal: Prevent/minimize sheer/friction injuries  Outcome: Progressing  Flowsheets (Taken 6/6/2024 1007)  Prevent/minimize sheer/friction injuries: Use pull sheet  Goal: Promote skin healing  Outcome: Progressing  Flowsheets (Taken 6/6/2024 1007)  Promote skin healing: Assess skin/pad under line(s)/device(s)     Problem: Fall/Injury  Goal: Be free from injury by end of the shift  Outcome: Progressing  Goal: Verbalize understanding of personal risk factors for fall in the hospital  Outcome: Progressing

## 2024-06-07 LAB
ANION GAP SERPL CALC-SCNC: 12 MMOL/L (ref 10–20)
BUN SERPL-MCNC: 39 MG/DL (ref 6–23)
CALCIUM SERPL-MCNC: 8.4 MG/DL (ref 8.6–10.3)
CHLORIDE SERPL-SCNC: 108 MMOL/L (ref 98–107)
CO2 SERPL-SCNC: 23 MMOL/L (ref 21–32)
CREAT SERPL-MCNC: 1.89 MG/DL (ref 0.5–1.3)
EGFRCR SERPLBLD CKD-EPI 2021: 36 ML/MIN/1.73M*2
ERYTHROCYTE [DISTWIDTH] IN BLOOD BY AUTOMATED COUNT: 13.5 % (ref 11.5–14.5)
GLUCOSE SERPL-MCNC: 97 MG/DL (ref 74–99)
HCT VFR BLD AUTO: 36.8 % (ref 41–52)
HGB BLD-MCNC: 12.3 G/DL (ref 13.5–17.5)
INR PPP: 1.4 (ref 0.9–1.1)
MCH RBC QN AUTO: 31.2 PG (ref 26–34)
MCHC RBC AUTO-ENTMCNC: 33.4 G/DL (ref 32–36)
MCV RBC AUTO: 93 FL (ref 80–100)
NRBC BLD-RTO: 0 /100 WBCS (ref 0–0)
PLATELET # BLD AUTO: 131 X10*3/UL (ref 150–450)
POTASSIUM SERPL-SCNC: 4 MMOL/L (ref 3.5–5.3)
PROTHROMBIN TIME: 15.9 SECONDS (ref 9.8–12.8)
RBC # BLD AUTO: 3.94 X10*6/UL (ref 4.5–5.9)
SODIUM SERPL-SCNC: 139 MMOL/L (ref 136–145)
WBC # BLD AUTO: 5.3 X10*3/UL (ref 4.4–11.3)

## 2024-06-07 PROCEDURE — 36415 COLL VENOUS BLD VENIPUNCTURE: CPT | Performed by: INTERNAL MEDICINE

## 2024-06-07 PROCEDURE — 1200000002 HC GENERAL ROOM WITH TELEMETRY DAILY

## 2024-06-07 PROCEDURE — 99231 SBSQ HOSP IP/OBS SF/LOW 25: CPT | Performed by: INTERNAL MEDICINE

## 2024-06-07 PROCEDURE — 85027 COMPLETE CBC AUTOMATED: CPT | Performed by: INTERNAL MEDICINE

## 2024-06-07 PROCEDURE — 2500000002 HC RX 250 W HCPCS SELF ADMINISTERED DRUGS (ALT 637 FOR MEDICARE OP, ALT 636 FOR OP/ED): Mod: MUE | Performed by: INTERNAL MEDICINE

## 2024-06-07 PROCEDURE — 85610 PROTHROMBIN TIME: CPT | Performed by: INTERNAL MEDICINE

## 2024-06-07 PROCEDURE — 80048 BASIC METABOLIC PNL TOTAL CA: CPT | Performed by: INTERNAL MEDICINE

## 2024-06-07 PROCEDURE — 2500000001 HC RX 250 WO HCPCS SELF ADMINISTERED DRUGS (ALT 637 FOR MEDICARE OP): Performed by: NURSE PRACTITIONER

## 2024-06-07 PROCEDURE — 97110 THERAPEUTIC EXERCISES: CPT | Mod: GP,CQ

## 2024-06-07 PROCEDURE — 2500000001 HC RX 250 WO HCPCS SELF ADMINISTERED DRUGS (ALT 637 FOR MEDICARE OP): Performed by: INTERNAL MEDICINE

## 2024-06-07 PROCEDURE — 97116 GAIT TRAINING THERAPY: CPT | Mod: GP,CQ

## 2024-06-07 PROCEDURE — 2500000002 HC RX 250 W HCPCS SELF ADMINISTERED DRUGS (ALT 637 FOR MEDICARE OP, ALT 636 FOR OP/ED): Performed by: INTERNAL MEDICINE

## 2024-06-07 RX ADMIN — ALLOPURINOL 150 MG: 300 TABLET ORAL at 08:54

## 2024-06-07 RX ADMIN — WARFARIN SODIUM 7.5 MG: 5 TABLET ORAL at 17:51

## 2024-06-07 RX ADMIN — METOPROLOL SUCCINATE 50 MG: 50 TABLET, EXTENDED RELEASE ORAL at 08:55

## 2024-06-07 RX ADMIN — DOCUSATE SODIUM 100 MG: 100 CAPSULE, LIQUID FILLED ORAL at 21:00

## 2024-06-07 RX ADMIN — CHOLECALCIFEROL TAB 25 MCG (1000 UNIT) 2000 UNITS: 25 TAB at 08:55

## 2024-06-07 RX ADMIN — ATORVASTATIN CALCIUM 40 MG: 40 TABLET, FILM COATED ORAL at 08:55

## 2024-06-07 RX ADMIN — ISOSORBIDE DINITRATE 20 MG: 10 TABLET ORAL at 08:55

## 2024-06-07 RX ADMIN — Medication 3 MG: at 20:39

## 2024-06-07 RX ADMIN — DAPAGLIFLOZIN 5 MG: 5 TABLET, FILM COATED ORAL at 08:55

## 2024-06-07 RX ADMIN — AMIODARONE HYDROCHLORIDE 200 MG: 200 TABLET ORAL at 08:54

## 2024-06-07 RX ADMIN — TAMSULOSIN HYDROCHLORIDE 0.4 MG: 0.4 CAPSULE ORAL at 08:55

## 2024-06-07 RX ADMIN — DOCUSATE SODIUM 100 MG: 100 CAPSULE, LIQUID FILLED ORAL at 08:55

## 2024-06-07 RX ADMIN — FINASTERIDE 5 MG: 5 TABLET, FILM COATED ORAL at 08:55

## 2024-06-07 ASSESSMENT — COGNITIVE AND FUNCTIONAL STATUS - GENERAL
HELP NEEDED FOR BATHING: A LITTLE
MOVING TO AND FROM BED TO CHAIR: A LITTLE
MOBILITY SCORE: 19
CLIMB 3 TO 5 STEPS WITH RAILING: A LITTLE
STANDING UP FROM CHAIR USING ARMS: A LITTLE
WALKING IN HOSPITAL ROOM: A LITTLE
TOILETING: A LITTLE
MOVING TO AND FROM BED TO CHAIR: A LITTLE
WALKING IN HOSPITAL ROOM: A LITTLE
MOBILITY SCORE: 20
CLIMB 3 TO 5 STEPS WITH RAILING: A LOT
DAILY ACTIVITIY SCORE: 22
STANDING UP FROM CHAIR USING ARMS: A LITTLE

## 2024-06-07 ASSESSMENT — PAIN SCALES - GENERAL
PAINLEVEL_OUTOF10: 0 - NO PAIN
PAINLEVEL_OUTOF10: 0 - NO PAIN

## 2024-06-07 NOTE — PROGRESS NOTES
Physical Therapy    Physical Therapy Treatment    Patient Name: Cheko Jin  MRN: [id number]  Today's Date: 6/7/2024  Time Calculation  Start Time: 1455  Stop Time: 1518  Time Calculation (min): 23 min    Assessment/Plan   PT Assessment  End of Session Communication: Bedside nurse  End of Session Patient Position:  (Seated EOB, call light and needs within reach.)     PT Plan  Treatment/Interventions: Bed mobility, Transfer training, Gait training, Stair training, Balance training, Strengthening, Endurance training, Therapeutic exercise, Therapeutic activity  PT Plan: Skilled PT  PT Frequency: 5 times per week  PT Discharge Recommendations: Moderate intensity level of continued care      General Visit Information:   PT  Visit  PT Received On: 06/07/24  General  Prior to Session Communication: Bedside nurse  Patient Position Received: Bed, 2 rail up, Alarm off, not on at start of session  General Comment: Patient pleasant and agreeable to therapy.    Subjective   Precautions:  Precautions  Medical Precautions: Fall precautions, Cardiac precautions  Precautions Comment: heart monitor holster -cardiac precautions  Vital Signs:       Objective   Treatments:  Therapeutic Exercise  Therapeutic Exercise Performed: Yes  Therapeutic Exercise Activity 1: Patient performed standing therex with back of chair for support and CGA x1, B LE: Heel raises, hip flexion, knee flexion, and hip abd/add [id number]    Ambulation/Gait Training  Ambulation/Gait Training Performed: Yes  Ambulation/Gait Training 1  Comments/Distance (ft) 1: Patient ambulated ~100ft x2 trials with no AD and CGA/Vishal x1. Mild instability and deviation from midline however, no LOB noted.  Transfers  Transfer: Yes  Transfer 1  Trials/Comments 1: Patient performed sit<>Stand transfer with SBA x1.    Outcome Measures:  [facility] Basic Mobility  Turning from your back to your side while in a flat bed without using bedrails: None  Moving from lying on your back to sitting  on the side of a flat bed without using bedrails: None  Moving to and from bed to chair (including a wheelchair): A little  Standing up from a chair using your arms (e.g. wheelchair or bedside chair): A little  To walk in hospital room: A little  Climbing 3-5 steps with railing: A lot  Basic Mobility - Total Score: 19    Education Documentation  No documentation found.  Education Comments  No comments found.        OP EDUCATION:       Encounter Problems       Encounter Problems (Active)       PT Problem       Pt. will perform bed mobility independently (Met)       Start:  06/03/24    Expected End:  06/17/24    Resolved:  06/06/24         Pt. will txfr independently (Progressing)       Start:  06/03/24    Expected End:  06/17/24            Pt. will ambulate >=150ft. with lrd prn independently (Progressing)       Start:  06/03/24    Expected End:  06/17/24            Pt. will perform leonidas le therex x >=20reps for increased fxl strength/endurance (Progressing)       Start:  06/03/24    Expected End:  06/17/24            Pt. will demonstrate >=G dyn stand balance with lrd prn (Progressing)       Start:  06/03/24    Expected End:  06/17/24               Pain - Adult

## 2024-06-07 NOTE — CARE PLAN
The patient's goals for the shift include to obtain guardianship.    The clinical goals for the shift include safety and comfort

## 2024-06-07 NOTE — PROGRESS NOTES
"Cheko Jin is a 78 y.o. male on day 6 of admission presenting with Hyponatremia.    Subjective   Pt in bed, comfortable and without complaints.         Objective     Physical Exam  EN:  awake, not in acute distress  HEENT:  laceration and skin excoriated top of head, PERRL, EOM intact, nares patent  Cardio:  RRR, no murmurs  Resp:  CTAB, no wheezing  Abd:  +BS, soft, nontender  :  deferred  Neuro:  alert and oriented, CN2-12 grossly intact, no focal deficits  Msk:  no gross deformities, no joint effusions  Skin:  warm, dry, intact  Psych:  pleasant, forgetful    Last Recorded Vitals  Blood pressure 127/72, pulse 67, temperature 36.8 °C (98.2 °F), temperature source Temporal, resp. rate 18, height 1.753 m (5' 9.02\"), weight 79.4 kg (175 lb 0.7 oz), SpO2 90%.  Intake/Output last 3 Shifts:  I/O last 3 completed shifts:  In: 300 (3.8 mL/kg) [P.O.:300]  Out: 200 (2.5 mL/kg) [Urine:200 (0.1 mL/kg/hr)]  Weight: 79.4 kg     Relevant Results              Scheduled medications  allopurinol, 150 mg, oral, Daily  amiodarone, 200 mg, oral, Daily  atorvastatin, 40 mg, oral, Daily  cholecalciferol, 2,000 Units, oral, Daily  dapagliflozin propanediol, 5 mg, oral, Daily  docusate sodium, 100 mg, oral, BID  finasteride, 5 mg, oral, Daily  [Held by provider] furosemide, 40 mg, oral, Daily  isosorbide dinitrate, 20 mg, oral, Daily  melatonin, 3 mg, oral, Nightly  metoprolol succinate XL, 50 mg, oral, Daily  tamsulosin, 0.4 mg, oral, Daily  warfarin, 7.5 mg, oral, Daily      Continuous medications     PRN medications  PRN medications: acetaminophen **OR** [DISCONTINUED] acetaminophen **OR** [DISCONTINUED] acetaminophen, LORazepam, LORazepam          Assessment/Plan   Principal Problem:    Hyponatremia    # Hyponatremia, resolved  # Mechanical fall  # Left gluteus dina IM hemorrhage  # Basilar artery calcification vs thrombus  # Age-indeterminate C5 spinous process fracture  # STUART on CKD  # A-flutter on warfarin  # HFrEF, " dilated cardiomyopathy  # Nonobstructive CAD  # h/o strokes  # Advanced Alzheimer dementia     Hyponatremia resolved.  Vital signs stable, H/H stable.    INR trending up but still subtherapeutic.  Continue at current dose and monitor INR.  When patient is discharged, he can follow at coumadin clinic to monitor INR.  Psych note reviewed.  Patient lacks capacity for decision making.  TCC and SW working on guardianship, patient case is active with APS worker.         I spent 20 minutes in the professional and overall care of this patient.      Germán Mills, DO

## 2024-06-07 NOTE — PROGRESS NOTES
APS , Caity De La Rosa, [phone] here to visit pt. Provided her with MOCA & face sheet for pt per her request. She indicates that APS has not gotten the SEE from pt's PCP, Dr. Woodruff.   Apprised her that I had approached pt re: possible SNF placement & he refused to consider this, indicating that he was going to return to his apt. He also refused to discuss any support services, stating he does not need any services. Informed her that we will be contacting her if pt returns to home. Leona Hamilton, LSW     1430, Met with pt. He continues to refuse consider any placement.   Spoke with pt's DPOA, Madelyn, [phone]. Discussed discharge planning with her. Apprised her of pt's refusal to pursue alternate planning, insisting on returning home. Asked Madelyn if she'd consider speaking with pt re: Al. She  declines, indicating that pt is stubborn & will not consider what she says. . Message ;left for APS, Ms. De La Rosa, updating her. .Leona Hamilton, LSW     9444, Updated attending, Dr. Mills & Mr. Sullivan, Psych CNP, as well as Ms. Pelletier, Transitions .  Leona Hamilton, LSW

## 2024-06-08 LAB
ANION GAP SERPL CALC-SCNC: 11 MMOL/L (ref 10–20)
BUN SERPL-MCNC: 39 MG/DL (ref 6–23)
CALCIUM SERPL-MCNC: 8.5 MG/DL (ref 8.6–10.3)
CHLORIDE SERPL-SCNC: 108 MMOL/L (ref 98–107)
CO2 SERPL-SCNC: 24 MMOL/L (ref 21–32)
CREAT SERPL-MCNC: 1.98 MG/DL (ref 0.5–1.3)
EGFRCR SERPLBLD CKD-EPI 2021: 34 ML/MIN/1.73M*2
ERYTHROCYTE [DISTWIDTH] IN BLOOD BY AUTOMATED COUNT: 14 % (ref 11.5–14.5)
GLUCOSE SERPL-MCNC: 93 MG/DL (ref 74–99)
HCT VFR BLD AUTO: 35.4 % (ref 41–52)
HGB BLD-MCNC: 12 G/DL (ref 13.5–17.5)
INR PPP: 1.9 (ref 0.9–1.1)
MCH RBC QN AUTO: 31.8 PG (ref 26–34)
MCHC RBC AUTO-ENTMCNC: 33.9 G/DL (ref 32–36)
MCV RBC AUTO: 94 FL (ref 80–100)
NRBC BLD-RTO: 0 /100 WBCS (ref 0–0)
PLATELET # BLD AUTO: 159 X10*3/UL (ref 150–450)
POTASSIUM SERPL-SCNC: 4.2 MMOL/L (ref 3.5–5.3)
PROTHROMBIN TIME: 21.3 SECONDS (ref 9.8–12.8)
RBC # BLD AUTO: 3.77 X10*6/UL (ref 4.5–5.9)
SODIUM SERPL-SCNC: 139 MMOL/L (ref 136–145)
WBC # BLD AUTO: 6 X10*3/UL (ref 4.4–11.3)

## 2024-06-08 PROCEDURE — 2500000001 HC RX 250 WO HCPCS SELF ADMINISTERED DRUGS (ALT 637 FOR MEDICARE OP): Performed by: NURSE PRACTITIONER

## 2024-06-08 PROCEDURE — 85610 PROTHROMBIN TIME: CPT | Performed by: INTERNAL MEDICINE

## 2024-06-08 PROCEDURE — 2500000002 HC RX 250 W HCPCS SELF ADMINISTERED DRUGS (ALT 637 FOR MEDICARE OP, ALT 636 FOR OP/ED): Mod: MUE | Performed by: INTERNAL MEDICINE

## 2024-06-08 PROCEDURE — 36415 COLL VENOUS BLD VENIPUNCTURE: CPT | Performed by: INTERNAL MEDICINE

## 2024-06-08 PROCEDURE — 2500000001 HC RX 250 WO HCPCS SELF ADMINISTERED DRUGS (ALT 637 FOR MEDICARE OP): Performed by: INTERNAL MEDICINE

## 2024-06-08 PROCEDURE — 99231 SBSQ HOSP IP/OBS SF/LOW 25: CPT | Performed by: INTERNAL MEDICINE

## 2024-06-08 PROCEDURE — 1200000002 HC GENERAL ROOM WITH TELEMETRY DAILY

## 2024-06-08 PROCEDURE — 2500000002 HC RX 250 W HCPCS SELF ADMINISTERED DRUGS (ALT 637 FOR MEDICARE OP, ALT 636 FOR OP/ED): Performed by: INTERNAL MEDICINE

## 2024-06-08 PROCEDURE — 85027 COMPLETE CBC AUTOMATED: CPT | Performed by: INTERNAL MEDICINE

## 2024-06-08 PROCEDURE — 80048 BASIC METABOLIC PNL TOTAL CA: CPT | Performed by: INTERNAL MEDICINE

## 2024-06-08 RX ADMIN — FINASTERIDE 5 MG: 5 TABLET, FILM COATED ORAL at 09:06

## 2024-06-08 RX ADMIN — AMIODARONE HYDROCHLORIDE 200 MG: 200 TABLET ORAL at 09:06

## 2024-06-08 RX ADMIN — ISOSORBIDE DINITRATE 20 MG: 10 TABLET ORAL at 09:06

## 2024-06-08 RX ADMIN — METOPROLOL SUCCINATE 50 MG: 50 TABLET, EXTENDED RELEASE ORAL at 09:05

## 2024-06-08 RX ADMIN — ALLOPURINOL 150 MG: 300 TABLET ORAL at 09:06

## 2024-06-08 RX ADMIN — ATORVASTATIN CALCIUM 40 MG: 40 TABLET, FILM COATED ORAL at 09:05

## 2024-06-08 RX ADMIN — CHOLECALCIFEROL TAB 25 MCG (1000 UNIT) 2000 UNITS: 25 TAB at 09:05

## 2024-06-08 RX ADMIN — DOCUSATE SODIUM 100 MG: 100 CAPSULE, LIQUID FILLED ORAL at 09:06

## 2024-06-08 RX ADMIN — DAPAGLIFLOZIN 5 MG: 5 TABLET, FILM COATED ORAL at 09:05

## 2024-06-08 RX ADMIN — Medication 3 MG: at 21:19

## 2024-06-08 RX ADMIN — TAMSULOSIN HYDROCHLORIDE 0.4 MG: 0.4 CAPSULE ORAL at 09:05

## 2024-06-08 RX ADMIN — WARFARIN SODIUM 7.5 MG: 5 TABLET ORAL at 17:29

## 2024-06-08 RX ADMIN — DOCUSATE SODIUM 100 MG: 100 CAPSULE, LIQUID FILLED ORAL at 21:19

## 2024-06-08 ASSESSMENT — COGNITIVE AND FUNCTIONAL STATUS - GENERAL
DAILY ACTIVITIY SCORE: 24
MOBILITY SCORE: 24

## 2024-06-08 ASSESSMENT — PAIN SCALES - GENERAL
PAINLEVEL_OUTOF10: 0 - NO PAIN
PAINLEVEL_OUTOF10: 0 - NO PAIN

## 2024-06-08 NOTE — CARE PLAN
The patient's goals for the shift include to be discharged    The clinical goals for the shift include rest

## 2024-06-08 NOTE — CARE PLAN
Problem: Pain  Goal: My pain/discomfort is manageable  Outcome: Progressing     Problem: Safety  Goal: Patient will be injury free during hospitalization  Outcome: Progressing  Goal: I will remain free of falls  Outcome: Progressing     Problem: Daily Care  Goal: Daily care needs are met  Outcome: Progressing     Problem: Psychosocial Needs  Goal: Demonstrates ability to cope with hospitalization/illness  Outcome: Progressing  Goal: Collaborate with me, my family, and caregiver to identify my specific goals  Outcome: Progressing     Problem: Discharge Barriers  Goal: My discharge needs are met  Outcome: Progressing     Problem: Skin  Goal: Prevent/manage excess moisture  Outcome: Progressing  Flowsheets (Taken 6/7/2024 1036 by Regi Dwyer RN)  Prevent/manage excess moisture: Follow provider orders for dressing changes  Goal: Prevent/minimize sheer/friction injuries  Outcome: Progressing  Flowsheets (Taken 6/7/2024 2218)  Prevent/minimize sheer/friction injuries: Use pull sheet  Goal: Promote skin healing  Outcome: Progressing  Flowsheets (Taken 6/7/2024 2218)  Promote skin healing: Assess skin/pad under line(s)/device(s)     Problem: Fall/Injury  Goal: Not fall by end of shift  Outcome: Progressing  Goal: Be free from injury by end of the shift  Outcome: Progressing  Goal: Verbalize understanding of personal risk factors for fall in the hospital  Outcome: Progressing  Goal: Verbalize understanding of risk factor reduction measures to prevent injury from fall in the home  Outcome: Progressing  Goal: Use assistive devices by end of the shift  Outcome: Progressing  Goal: Pace activities to prevent fatigue by end of the shift  Outcome: Progressing     Problem: Pain - Adult  Goal: Verbalizes/displays adequate comfort level or baseline comfort level  Outcome: Progressing     Problem: Safety - Adult  Goal: Free from fall injury  Outcome: Progressing     Problem: Discharge Planning  Goal: Discharge to home or other  facility with appropriate resources  Outcome: Progressing     Problem: Chronic Conditions and Co-morbidities  Goal: Patient's chronic conditions and co-morbidity symptoms are monitored and maintained or improved  Outcome: Progressing   The patient's goals for the shift include safety    The clinical goals for the shift include safety and comfort    Over the shift, the patient did not make progress toward the following goals. Barriers to progression include . Recommendations to address these barriers include .

## 2024-06-08 NOTE — PROGRESS NOTES
"Cheko Jin is a 78 y.o. male on day 7 of admission presenting with Hyponatremia.    Subjective   Pt comfortable in bed, wants to go home.       Objective     Physical Exam    GEN:  awake, not in acute distress  HEENT:  laceration and skin excoriated top of head, PERRL, EOM intact, nares patent  Cardio:  RRR, no murmurs  Resp:  CTAB, no wheezing  Abd:  +BS, soft, nontender  :  deferred  Neuro:  alert, CN2-12 grossly intact, no focal deficits  Msk:  no gross deformities, no joint effusions  Skin:  warm, dry, intact  Psych:  pleasant, forgetful    Last Recorded Vitals  Blood pressure 115/56, pulse 56, temperature 36.4 °C (97.5 °F), temperature source Temporal, resp. rate 18, height 1.753 m (5' 9.02\"), weight 79.4 kg (175 lb 0.7 oz), SpO2 92%.  Intake/Output last 3 Shifts:  I/O last 3 completed shifts:  In: 680 (8.6 mL/kg) [P.O.:680]  Out: - (0 mL/kg)   Weight: 79.4 kg     Relevant Results              Scheduled medications  allopurinol, 150 mg, oral, Daily  amiodarone, 200 mg, oral, Daily  atorvastatin, 40 mg, oral, Daily  cholecalciferol, 2,000 Units, oral, Daily  dapagliflozin propanediol, 5 mg, oral, Daily  docusate sodium, 100 mg, oral, BID  finasteride, 5 mg, oral, Daily  [Held by provider] furosemide, 40 mg, oral, Daily  isosorbide dinitrate, 20 mg, oral, Daily  melatonin, 3 mg, oral, Nightly  metoprolol succinate XL, 50 mg, oral, Daily  tamsulosin, 0.4 mg, oral, Daily  warfarin, 7.5 mg, oral, Daily      Continuous medications     PRN medications  PRN medications: acetaminophen **OR** [DISCONTINUED] acetaminophen **OR** [DISCONTINUED] acetaminophen, LORazepam, LORazepam          Assessment/Plan   Principal Problem:    Hyponatremia    # Hyponatremia, resolved  # Mechanical fall  # Left gluteus dina IM hemorrhage  # Basilar artery calcification vs thrombus  # Age-indeterminate C5 spinous process fracture  # STUART on CKD  # A-flutter on warfarin  # HFrEF, dilated cardiomyopathy  # Nonobstructive CAD  # h/o " strokes  # Advanced Alzheimer dementia     Continue to monitor INR while patient is in hospital.    Patient is medically stable for discharge.  He lacks capacity to make medical decisions.  Psych consulted and agrees he lacks ability to make medical decisions.    He has SID Joshi (daughter of his significant other), and APS is involved in patient's case.  Patient refusing to consider placement.  POANUM Joshi declines to speak with patient in this regards.  Social work working on case with APS to facilitate safe discharge for patient.         I spent 20 minutes in the professional and overall care of this patient.      Germán Mills, DO

## 2024-06-09 LAB
INR PPP: 2.7 (ref 0.9–1.1)
PROTHROMBIN TIME: 30.5 SECONDS (ref 9.8–12.8)

## 2024-06-09 PROCEDURE — 2500000002 HC RX 250 W HCPCS SELF ADMINISTERED DRUGS (ALT 637 FOR MEDICARE OP, ALT 636 FOR OP/ED): Performed by: INTERNAL MEDICINE

## 2024-06-09 PROCEDURE — 1200000002 HC GENERAL ROOM WITH TELEMETRY DAILY

## 2024-06-09 PROCEDURE — 2500000001 HC RX 250 WO HCPCS SELF ADMINISTERED DRUGS (ALT 637 FOR MEDICARE OP): Performed by: NURSE PRACTITIONER

## 2024-06-09 PROCEDURE — 2500000001 HC RX 250 WO HCPCS SELF ADMINISTERED DRUGS (ALT 637 FOR MEDICARE OP): Performed by: INTERNAL MEDICINE

## 2024-06-09 PROCEDURE — 85610 PROTHROMBIN TIME: CPT | Performed by: INTERNAL MEDICINE

## 2024-06-09 PROCEDURE — 99232 SBSQ HOSP IP/OBS MODERATE 35: CPT | Performed by: INTERNAL MEDICINE

## 2024-06-09 PROCEDURE — 36415 COLL VENOUS BLD VENIPUNCTURE: CPT | Performed by: INTERNAL MEDICINE

## 2024-06-09 RX ORDER — WARFARIN SODIUM 5 MG/1
5 TABLET ORAL DAILY
Status: DISCONTINUED | OUTPATIENT
Start: 2024-06-09 | End: 2024-06-09

## 2024-06-09 RX ORDER — WARFARIN 2.5 MG/1
2.5 TABLET ORAL DAILY
Status: DISCONTINUED | OUTPATIENT
Start: 2024-06-09 | End: 2024-06-12 | Stop reason: HOSPADM

## 2024-06-09 RX ADMIN — WARFARIN SODIUM 2.5 MG: 2.5 TABLET ORAL at 17:25

## 2024-06-09 RX ADMIN — DOCUSATE SODIUM 100 MG: 100 CAPSULE, LIQUID FILLED ORAL at 09:17

## 2024-06-09 RX ADMIN — Medication 3 MG: at 20:08

## 2024-06-09 RX ADMIN — AMIODARONE HYDROCHLORIDE 200 MG: 200 TABLET ORAL at 09:17

## 2024-06-09 RX ADMIN — ALLOPURINOL 150 MG: 300 TABLET ORAL at 09:17

## 2024-06-09 RX ADMIN — FINASTERIDE 5 MG: 5 TABLET, FILM COATED ORAL at 09:17

## 2024-06-09 RX ADMIN — METOPROLOL SUCCINATE 50 MG: 50 TABLET, EXTENDED RELEASE ORAL at 09:17

## 2024-06-09 RX ADMIN — ATORVASTATIN CALCIUM 40 MG: 40 TABLET, FILM COATED ORAL at 09:17

## 2024-06-09 RX ADMIN — ISOSORBIDE DINITRATE 20 MG: 10 TABLET ORAL at 09:17

## 2024-06-09 RX ADMIN — DOCUSATE SODIUM 100 MG: 100 CAPSULE, LIQUID FILLED ORAL at 20:08

## 2024-06-09 RX ADMIN — TAMSULOSIN HYDROCHLORIDE 0.4 MG: 0.4 CAPSULE ORAL at 09:17

## 2024-06-09 RX ADMIN — DAPAGLIFLOZIN 5 MG: 5 TABLET, FILM COATED ORAL at 09:17

## 2024-06-09 RX ADMIN — CHOLECALCIFEROL TAB 25 MCG (1000 UNIT) 2000 UNITS: 25 TAB at 09:17

## 2024-06-09 ASSESSMENT — PAIN SCALES - GENERAL
PAINLEVEL_OUTOF10: 0 - NO PAIN
PAINLEVEL_OUTOF10: 0 - NO PAIN

## 2024-06-09 ASSESSMENT — COGNITIVE AND FUNCTIONAL STATUS - GENERAL
DAILY ACTIVITIY SCORE: 24
MOBILITY SCORE: 24

## 2024-06-09 NOTE — CARE PLAN
The patient's goals for the shift include safety    The clinical goals for the shift include maintain safety and comfort

## 2024-06-09 NOTE — CARE PLAN
The patient's goals for the shift include safety    The clinical goals for the shift include safety and comfort    Problem: Fall/Injury  Goal: Not fall by end of shift  Outcome: Progressing  Goal: Be free from injury by end of the shift  Outcome: Progressing  Goal: Verbalize understanding of personal risk factors for fall in the hospital  Outcome: Progressing  Goal: Verbalize understanding of risk factor reduction measures to prevent injury from fall in the home  Outcome: Progressing  Goal: Use assistive devices by end of the shift  Outcome: Progressing  Goal: Pace activities to prevent fatigue by end of the shift  Outcome: Progressing

## 2024-06-09 NOTE — PROGRESS NOTES
"Cheko Jin is a 78 y.o. male on day 8 of admission presenting with Hyponatremia.    Subjective   Pt has no new complaints.  Comfortable in bed       Objective     Physical Exam    GEN:  awake, not in acute distress  HEENT:  laceration and skin excoriated top of head, PERRL, EOM intact, nares patent  Cardio:  RRR, no murmurs  Resp:  CTAB, no wheezing  Abd:  +BS, soft, nontender  :  deferred  Neuro:  alert, CN2-12 grossly intact, no focal deficits  Msk:  no gross deformities, no joint effusions  Skin:  warm, dry, intact  Psych:  pleasant, forgetful    Last Recorded Vitals  Blood pressure 127/63, pulse 54, temperature 36.5 °C (97.7 °F), temperature source Temporal, resp. rate 16, height 1.753 m (5' 9.02\"), weight 79.4 kg (175 lb 0.7 oz), SpO2 94%.  Intake/Output last 3 Shifts:  I/O last 3 completed shifts:  In: 200 (2.5 mL/kg) [P.O.:200]  Out: - (0 mL/kg)   Weight: 79.4 kg     Relevant Results              Scheduled medications  allopurinol, 150 mg, oral, Daily  amiodarone, 200 mg, oral, Daily  atorvastatin, 40 mg, oral, Daily  cholecalciferol, 2,000 Units, oral, Daily  dapagliflozin propanediol, 5 mg, oral, Daily  docusate sodium, 100 mg, oral, BID  finasteride, 5 mg, oral, Daily  [Held by provider] furosemide, 40 mg, oral, Daily  isosorbide dinitrate, 20 mg, oral, Daily  melatonin, 3 mg, oral, Nightly  metoprolol succinate XL, 50 mg, oral, Daily  tamsulosin, 0.4 mg, oral, Daily  warfarin, 2.5 mg, oral, Daily      Continuous medications     PRN medications  PRN medications: acetaminophen **OR** [DISCONTINUED] acetaminophen **OR** [DISCONTINUED] acetaminophen, LORazepam, LORazepam          Assessment/Plan   Principal Problem:    Hyponatremia    # Hyponatremia, resolved  # Mechanical fall  # Left gluteus dina IM hemorrhage  # Basilar artery calcification vs thrombus  # Age-indeterminate C5 spinous process fracture  # STUART on CKD  # A-flutter on warfarin  # HFrEF, dilated cardiomyopathy  # Nonobstructive CAD  # " h/o strokes  # Advanced Alzheimer dementia    Reduce dose of coumadin tonight to 2.5mg.  Check INR in AM.  Vital signs stable.    Patient is medically stable for discharge.  He lacks capacity to make medical decisions.  Psych consulted and agrees he lacks ability to make medical decisions.    He has SID Joshi (daughter of his significant other), and APS is involved in patient's case.  Patient refusing to consider placement.  SID Joshi declines to speak with patient in this regards.  Social work working on case with APS to facilitate safe discharge for patient.         I spent 25 minutes in the professional and overall care of this patient.      Germán Mills, DO

## 2024-06-10 LAB
HOLD SPECIMEN: NORMAL
HOLD SPECIMEN: NORMAL
INR PPP: 2.8 (ref 0.9–1.1)
PROTHROMBIN TIME: 31.9 SECONDS (ref 9.8–12.8)

## 2024-06-10 PROCEDURE — 2500000001 HC RX 250 WO HCPCS SELF ADMINISTERED DRUGS (ALT 637 FOR MEDICARE OP): Performed by: NURSE PRACTITIONER

## 2024-06-10 PROCEDURE — 36415 COLL VENOUS BLD VENIPUNCTURE: CPT | Performed by: INTERNAL MEDICINE

## 2024-06-10 PROCEDURE — 1200000002 HC GENERAL ROOM WITH TELEMETRY DAILY

## 2024-06-10 PROCEDURE — 2500000002 HC RX 250 W HCPCS SELF ADMINISTERED DRUGS (ALT 637 FOR MEDICARE OP, ALT 636 FOR OP/ED): Performed by: INTERNAL MEDICINE

## 2024-06-10 PROCEDURE — 85610 PROTHROMBIN TIME: CPT | Performed by: INTERNAL MEDICINE

## 2024-06-10 PROCEDURE — 2500000001 HC RX 250 WO HCPCS SELF ADMINISTERED DRUGS (ALT 637 FOR MEDICARE OP): Performed by: INTERNAL MEDICINE

## 2024-06-10 PROCEDURE — 99232 SBSQ HOSP IP/OBS MODERATE 35: CPT | Performed by: INTERNAL MEDICINE

## 2024-06-10 RX ORDER — FUROSEMIDE 20 MG/1
20 TABLET ORAL DAILY
Status: DISCONTINUED | OUTPATIENT
Start: 2024-06-11 | End: 2024-06-12 | Stop reason: HOSPADM

## 2024-06-10 RX ADMIN — CHOLECALCIFEROL TAB 25 MCG (1000 UNIT) 2000 UNITS: 25 TAB at 09:31

## 2024-06-10 RX ADMIN — FINASTERIDE 5 MG: 5 TABLET, FILM COATED ORAL at 09:32

## 2024-06-10 RX ADMIN — DOCUSATE SODIUM 100 MG: 100 CAPSULE, LIQUID FILLED ORAL at 09:32

## 2024-06-10 RX ADMIN — DAPAGLIFLOZIN 5 MG: 5 TABLET, FILM COATED ORAL at 09:34

## 2024-06-10 RX ADMIN — ISOSORBIDE DINITRATE 20 MG: 10 TABLET ORAL at 09:32

## 2024-06-10 RX ADMIN — Medication 3 MG: at 21:04

## 2024-06-10 RX ADMIN — TAMSULOSIN HYDROCHLORIDE 0.4 MG: 0.4 CAPSULE ORAL at 09:30

## 2024-06-10 RX ADMIN — ATORVASTATIN CALCIUM 40 MG: 40 TABLET, FILM COATED ORAL at 09:31

## 2024-06-10 RX ADMIN — AMIODARONE HYDROCHLORIDE 200 MG: 200 TABLET ORAL at 09:30

## 2024-06-10 RX ADMIN — ALLOPURINOL 150 MG: 300 TABLET ORAL at 09:31

## 2024-06-10 RX ADMIN — METOPROLOL SUCCINATE 50 MG: 50 TABLET, EXTENDED RELEASE ORAL at 09:31

## 2024-06-10 ASSESSMENT — PAIN SCALES - GENERAL
PAINLEVEL_OUTOF10: 0 - NO PAIN
PAINLEVEL_OUTOF10: 0 - NO PAIN

## 2024-06-10 ASSESSMENT — COGNITIVE AND FUNCTIONAL STATUS - GENERAL
CLIMB 3 TO 5 STEPS WITH RAILING: A LITTLE
MOBILITY SCORE: 23

## 2024-06-10 ASSESSMENT — PAIN - FUNCTIONAL ASSESSMENT: PAIN_FUNCTIONAL_ASSESSMENT: 0-10

## 2024-06-10 NOTE — PROGRESS NOTES
"Cheko Jin is a 78 y.o. male on day 9 of admission presenting with Hyponatremia.    Subjective   Pt alert and awake, ambulating in room.  His memory is poor, does not recall how he end up in the hospital.  I called and spoke with his HCPOA Dipak.       Objective     Physical Exam    GEN:  awake, not in acute distress  HEENT:  laceration and skin excoriated top of head, PERRL, EOM intact, nares patent  Cardio:  RRR, no murmurs  Resp:  CTAB, no wheezing  Abd:  +BS, soft, nontender  :  deferred  Neuro:  alert, CN2-12 grossly intact, no focal deficits  Msk:  no gross deformities, no joint effusions  Skin:  warm, dry, intact  Psych:  pleasant, forgetful    Last Recorded Vitals  Blood pressure 118/58, pulse 58, temperature 36.5 °C (97.7 °F), temperature source Temporal, resp. rate 18, height 1.753 m (5' 9.02\"), weight 79.4 kg (175 lb 0.7 oz), SpO2 94%.  Intake/Output last 3 Shifts:  I/O last 3 completed shifts:  In: 880 (11.1 mL/kg) [P.O.:880]  Out: - (0 mL/kg)   Weight: 79.4 kg     Relevant Results              Scheduled medications  allopurinol, 150 mg, oral, Daily  amiodarone, 200 mg, oral, Daily  atorvastatin, 40 mg, oral, Daily  cholecalciferol, 2,000 Units, oral, Daily  dapagliflozin propanediol, 5 mg, oral, Daily  docusate sodium, 100 mg, oral, BID  finasteride, 5 mg, oral, Daily  [Held by provider] furosemide, 40 mg, oral, Daily  isosorbide dinitrate, 20 mg, oral, Daily  melatonin, 3 mg, oral, Nightly  metoprolol succinate XL, 50 mg, oral, Daily  tamsulosin, 0.4 mg, oral, Daily  [Held by provider] warfarin, 2.5 mg, oral, Daily      Continuous medications     PRN medications  PRN medications: acetaminophen **OR** [DISCONTINUED] acetaminophen **OR** [DISCONTINUED] acetaminophen, LORazepam, LORazepam          Assessment/Plan   Principal Problem:    Hyponatremia    # Hyponatremia, resolved  # Mechanical fall  # Left gluteus dina IM hemorrhage  # Basilar artery calcification vs thrombus  # Age-indeterminate " C5 spinous process fracture  # STUART on CKD  # A-flutter on warfarin  # HFrEF, dilated cardiomyopathy  # Nonobstructive CAD  # h/o strokes  # Advanced Alzheimer dementia    I called and spoke with Dipak Tracy ([phone]) who is the HC DPOA.  I updated him about patient's medical status and we discussed discharge planning.  Medically, patient is stable to be discharge but he has poor memory and insight.  He cannot be by himself and requires 24/7 supervision.  Dipak is the son of patient's significant other.  Dipak is wanting to hire 24/7 care for patient and his significant other so they can both live in their house.    I discussed this with the .  INR therapeutic.  Continue warfarin 2.5mg.    Creatinine downtrending but not normal.  Likely patient this creatinine is near his baseline, no previous creatinine to compare.  Plan to resume lasix 20mg oral daily at discharge.             I spent 32 minutes in the professional and overall care of this patient.      Germán Mills, DO

## 2024-06-10 NOTE — PROGRESS NOTES
Rec'd message per nsg that friend/DPOA, Dipak Tracy is here & has provided HC DPOA & Living Will. These are placed in chart.   Met with Mr. Tracy, introduced myself & provided education on SW/Transitions team role. Discussed status of plan, that I have spoken with pt on several occasions re: possible AL placement. Informed him that pt has refused, stating that he plans on returning to his apt.   Mr. Tracy has already spoken with pt & is wiling to follow up with pt & try to get him into a supervised environment. Discussed possible AL's with him. He requested that attending contact him. Will follow up.   Leona Hamilton, LSW     1045, Spoke with attending, Dr. Mills & asked that he speak with Mr. Tracy. Provided him with the contact numbers. Leona Hamilton, LSW     1100, Spoke with Mr. Tracy. He has been apprised that pt is likely ready to discharge & they need to make a decision re: discharge plan. He notes that he will be following up with private pay aides for 24/7 supervision vs AL placement. He has been informed by attending that pt  should not drive. Mr. Tracy verbalizes understanding. Leona Hamilton, FLORESITA   Predictive Model Details      *Score not for clinical use. Model is in validation.         1 (Low) Factor Value    Calculated 6/10/2024 11:17 Age 78    Identification of Sepsis Model Diagnosis of hypertension present     MCHC normal (33.9 g/dL)     Diagnosis of chronic liver disease present     RDW normal (14.0 %)     Hematocrit low (35.4 %)     Diagnosis of CHF present     Clinically Relevant Sex not female     Number of active analgesic antipyretic orders 1     Number of active hypnotics orders 1     Number of peripheral IVs 1     Hemoglobin low (12.0 g/dL)     Diagnosis of CAD present     Number of active antianginal orders 1     Number of active beta blocker orders 1     Number of active loop diuretic orders 1

## 2024-06-10 NOTE — CARE PLAN
The patient's goals for the shift include safety    The clinical goals for the shift include maintain safety and comfort    Problem: Safety  Goal: Patient will be injury free during hospitalization  Outcome: Progressing  Goal: I will remain free of falls  Outcome: Progressing     Problem: Fall/Injury  Goal: Not fall by end of shift  Outcome: Progressing  Goal: Be free from injury by end of the shift  Outcome: Progressing  Goal: Verbalize understanding of personal risk factors for fall in the hospital  Outcome: Progressing  Goal: Verbalize understanding of risk factor reduction measures to prevent injury from fall in the home  Outcome: Progressing  Goal: Use assistive devices by end of the shift  Outcome: Progressing  Goal: Pace activities to prevent fatigue by end of the shift  Outcome: Progressing

## 2024-06-10 NOTE — PROGRESS NOTES
Per nsg., pt's friend/HCDPOA,Dipak Tracy was here earlier this am. He brought in copies of pt's HC DPOA & Living Will. These were placed in pt's chart after reviewing. Dipak is listed as HC DPOA after his mother, Ophelia.   Met with Dipak, introducing myself, providing education on SW & Transitions Teams roles. Provided my contact information. Updated him on status of discharge plan, apprising that this SW  & TCC had discussed options, with pt noting plan to return to his apt at discharge. He refused to consider any other option.  Dipak had met with & spoken to pt earlier today. He verbalizes understanding that he can no longer return to his apt without adequate supervision/assistance. He asked if attending could contact him, apprised Dipak I'd follow up with him on this request.   Provided Dr. Mills contact information for Mr. Tracy. He spoke with Mr. Tracy & indicated that he was looking into different options.   Spoke with Dipak & he verifies that he will be checking on different options in consult with his mother. Dipak is aware that pt. Is likely ready for discharge & verbalizes understanding.   Updated Ms. Pelletier, , TCC & nsg. Leona Hamilton, FLORESITA

## 2024-06-11 VITALS
TEMPERATURE: 97.5 F | RESPIRATION RATE: 18 BRPM | HEIGHT: 69 IN | DIASTOLIC BLOOD PRESSURE: 67 MMHG | SYSTOLIC BLOOD PRESSURE: 121 MMHG | BODY MASS INDEX: 25.93 KG/M2 | HEART RATE: 64 BPM | OXYGEN SATURATION: 96 % | WEIGHT: 175.04 LBS

## 2024-06-11 LAB
HOLD SPECIMEN: NORMAL
HOLD SPECIMEN: NORMAL
INR PPP: 2.4 (ref 0.9–1.1)
PROTHROMBIN TIME: 27.6 SECONDS (ref 9.8–12.8)

## 2024-06-11 PROCEDURE — 99238 HOSP IP/OBS DSCHRG MGMT 30/<: CPT | Performed by: INTERNAL MEDICINE

## 2024-06-11 PROCEDURE — 2500000001 HC RX 250 WO HCPCS SELF ADMINISTERED DRUGS (ALT 637 FOR MEDICARE OP): Performed by: NURSE PRACTITIONER

## 2024-06-11 PROCEDURE — 36415 COLL VENOUS BLD VENIPUNCTURE: CPT | Performed by: INTERNAL MEDICINE

## 2024-06-11 PROCEDURE — 1100000001 HC PRIVATE ROOM DAILY

## 2024-06-11 PROCEDURE — 2500000002 HC RX 250 W HCPCS SELF ADMINISTERED DRUGS (ALT 637 FOR MEDICARE OP, ALT 636 FOR OP/ED): Performed by: INTERNAL MEDICINE

## 2024-06-11 PROCEDURE — 2500000001 HC RX 250 WO HCPCS SELF ADMINISTERED DRUGS (ALT 637 FOR MEDICARE OP): Performed by: INTERNAL MEDICINE

## 2024-06-11 PROCEDURE — 85610 PROTHROMBIN TIME: CPT | Performed by: INTERNAL MEDICINE

## 2024-06-11 RX ORDER — ATORVASTATIN CALCIUM 40 MG/1
40 TABLET, FILM COATED ORAL DAILY
Qty: 30 TABLET | Refills: 0 | Status: SHIPPED | OUTPATIENT
Start: 2024-06-12 | End: 2024-07-12

## 2024-06-11 RX ADMIN — Medication 3 MG: at 20:59

## 2024-06-11 RX ADMIN — ISOSORBIDE DINITRATE 20 MG: 10 TABLET ORAL at 09:07

## 2024-06-11 RX ADMIN — FINASTERIDE 5 MG: 5 TABLET, FILM COATED ORAL at 09:07

## 2024-06-11 RX ADMIN — ATORVASTATIN CALCIUM 40 MG: 40 TABLET, FILM COATED ORAL at 09:07

## 2024-06-11 RX ADMIN — DAPAGLIFLOZIN 5 MG: 5 TABLET, FILM COATED ORAL at 09:07

## 2024-06-11 RX ADMIN — CHOLECALCIFEROL TAB 25 MCG (1000 UNIT) 2000 UNITS: 25 TAB at 09:07

## 2024-06-11 RX ADMIN — DOCUSATE SODIUM 100 MG: 100 CAPSULE, LIQUID FILLED ORAL at 09:07

## 2024-06-11 RX ADMIN — TAMSULOSIN HYDROCHLORIDE 0.4 MG: 0.4 CAPSULE ORAL at 09:07

## 2024-06-11 RX ADMIN — ALLOPURINOL 150 MG: 300 TABLET ORAL at 09:07

## 2024-06-11 ASSESSMENT — PAIN SCALES - GENERAL
PAINLEVEL_OUTOF10: 0 - NO PAIN
PAINLEVEL_OUTOF10: 0 - NO PAIN

## 2024-06-11 ASSESSMENT — PAIN - FUNCTIONAL ASSESSMENT: PAIN_FUNCTIONAL_ASSESSMENT: 0-10

## 2024-06-11 NOTE — PROGRESS NOTES
"Nutrition Follow Up Assessment:     Nutrition History:  Energy Intake: Good > 75 %  Food and Nutrient History: Pt was sleeping at time of attempted visit today.  Pt eating 100% of meals. Continues with Regular diet and Mighty shakes once daily  Food Allergies/Intolerances:  None  GI Symptoms: None  Oral Problems: None       Anthropometrics:  Height: 175.3 cm (5' 9.02\")   Weight: 79.4 kg (175 lb 0.7 oz) (no new weight to assess)   BMI (Calculated): 25.84  IBW/kg (Dietitian Calculated): 72.7 kg  Percent of IBW: 109 %       Weight History:     Weight Change %:   No new weight to assess     Nutrition Focused Physical Exam Findings:  defer: completed 6/6/24  Subcutaneous Fat Loss:      Muscle Wasting:     Edema:  Edema: none  Physical Findings:  Skin: Positive (head)    Nutrition Significant Labs:  CBC Trend:   Results from last 7 days   Lab Units 06/08/24  0515 06/07/24  0503 06/06/24  0525   WBC AUTO x10*3/uL 6.0 5.3 5.4   RBC AUTO x10*6/uL 3.77* 3.94* 3.96*   HEMOGLOBIN g/dL 12.0* 12.3* 12.1*   HEMATOCRIT % 35.4* 36.8* 36.9*   MCV fL 94 93 93   PLATELETS AUTO x10*3/uL 159 131* 143*    , BMP Trend:   Results from last 7 days   Lab Units 06/08/24  0515 06/07/24  0503 06/06/24  0525   GLUCOSE mg/dL 93 97 97   CALCIUM mg/dL 8.5* 8.4* 8.7   SODIUM mmol/L 139 139 138   POTASSIUM mmol/L 4.2 4.0 4.0   CO2 mmol/L 24 23 26   CHLORIDE mmol/L 108* 108* 107   BUN mg/dL 39* 39* 46*   CREATININE mg/dL 1.98* 1.89* 2.00*        Nutrition Specific Medications:  Reviewed     I/O:   Last BM Date: 06/08/24;      Dietary Orders (From admission, onward)       Start     Ordered    06/03/24 1331  Oral nutritional supplements  Until discontinued        Question Answer Comment   Deliver with Dinner    Select supplement: Mighty Shake        06/03/24 1330    06/01/24 2201  Adult diet Regular  Diet effective now        Question:  Diet type  Answer:  Regular    06/01/24 2200                     Estimated Needs:      Method for Estimating Needs: " 1600-1750kcals (20-22kcals/kg ABW)     Method for Estimating Needs: 63-79g (0.8-1.0g/kg ABW) as renal function permits     Method for Estimating Needs: 1 mL/kcal or as per MD        Nutrition Diagnosis   Malnutrition Diagnosis  Patient has Malnutrition Diagnosis: No    Nutrition Diagnosis  Patient has Nutrition Diagnosis: Yes  Diagnosis Status (1): Ongoing  Nutrition Diagnosis 1: Increased nutrient needs  Related to (1): physiological causes increasing nutrient needs  As Evidenced by (1): scalp abrasion, UTI       Nutrition Interventions/Recommendations         Nutrition Prescription:  Individualized Nutrition Prescription Provided for : Regular diet as ordered. Mighty shake once daily at dinner        Nutrition Interventions:   Interventions: Meals and snacks, Medical food supplement  Meals and Snacks: General healthful diet  Goal: continue to consume >75% of meals--met  Medical Food Supplement: Commercial beverage  Goal: consume >75% of Mighty shakes once daily (for an additional 220 kcals, 6 gm protein each)--met         Nutrition Education:   N/A       Nutrition Monitoring and Evaluation   Food/Nutrient Related History Monitoring  Monitoring and Evaluation Plan: Energy intake, Fluid intake, Amount of food  Energy Intake: Estimated energy intake  Criteria: Meal/ONS intake to meet >75% of estimated needs  Fluid Intake: Estimated fluid intake  Criteria: fluid intake to meet >75% of estimated need  Amount of Food: Estimated amout of food, Medical food intake  Criteria: Pt to consume >75% of meals/ONS    Body Composition/Growth/Weight History  Monitoring and Evaluation Plan: Weight  Weight: Measured weight  Criteria: reweigh at least every 5 days    Biochemical Data, Medical Tests and Procedures  Monitoring and Evaluation Plan: Electrolyte/renal panel  Criteria: labs WNL    Nutrition Focused Physical Findings  Monitoring and Evaluation Plan: Skin, Digestive System  Criteria: BM at least every 2-3 days  Criteria:  promote healing and maintain skin integrity       Time Spent/Follow-up Reminder:   Time Spent (min): 30 minutes  Last Date of Nutrition Visit: 06/11/24  Nutrition Follow-Up Needed?: 3-8 days  Follow up Comment: 6/14-6/19 TG

## 2024-06-11 NOTE — PROGRESS NOTES
Updated Ms. De La Rosa on status. She notes that she has spoken with Mr. Tracy. Leona Hamilton, FLORESITA     4320, Rec'd voicemail from Mr. Tracy. He indicates that he has spoken with APS worker.   He notes that attending has contacted him & notes that pt is discharged. He states that he is not able to locate the financial DPOA to facilitate placement of pt @ [facility]. So he plans on transporting pt home @ 1000 tomorrow. He continue to pursue placement @ [facility], & he will be staying with pt until he is able to facilitate placement @ [facility]. Notes he is aware that pt requiring 24/7 supervision & will follow up.   TCC & attending are aware. FLORESITA Jasmine

## 2024-06-11 NOTE — DISCHARGE SUMMARY
Discharge Diagnosis  Hyponatremia    Issues Requiring Follow-Up  Coumadin clinic  See PCP  See neurology    Discharge Meds     Your medication list        ASK your doctor about these medications        Instructions Last Dose Given Next Dose Due   allopurinol 300 mg tablet  Commonly known as: Zyloprim           amiodarone 200 mg tablet  Commonly known as: Pacerone           atorvastatin 10 mg tablet  Commonly known as: Lipitor           cholecalciferol 50 MCG (2000 UT) tablet  Commonly known as: Vitamin D-3           dapagliflozin propanediol 5 mg  Commonly known as: Farxiga           finasteride 5 mg tablet  Commonly known as: Proscar           furosemide 40 mg tablet  Commonly known as: Lasix           hydrALAZINE 25 mg tablet  Commonly known as: Apresoline           isosorbide dinitrate 20 mg tablet  Commonly known as: Isordil           metoprolol succinate XL 50 mg 24 hr tablet  Commonly known as: Toprol-XL           multivitamin with minerals tablet           tamsulosin 0.4 mg 24 hr capsule  Commonly known as: Flomax           warfarin 5 mg tablet  Commonly known as: Coumadin                    Test Results Pending At Discharge  Pending Labs       No current pending labs.            Hospital Course   Patient admitted after trauma/ fall at home. Found to have gluteus intramuscular hemorrage, C5 fracture, elevated INR, dementia.  Patient seen by neurology, neurosurgery, general surgery and psychiatry.  Coumadin was initially held and then resume and therapeutic prior to dc.  Neurology and neurosurgery evaluated and will follow up outpatient.  Psychiatry deemed patient did not have capacity, and patient will be discharged to home with POA with 24/7 care and needs cane for ambulation.    Pertinent Physical Exam At Time of Discharge  Physical Exam  Vitals reviewed.   Constitutional:       Appearance: Normal appearance.   HENT:      Head: Normocephalic and atraumatic.      Mouth/Throat:      Mouth: Mucous membranes  are moist.   Eyes:      Conjunctiva/sclera: Conjunctivae normal.   Cardiovascular:      Rate and Rhythm: Normal rate.      Pulses: Normal pulses.   Pulmonary:      Effort: Pulmonary effort is normal.      Breath sounds: Normal breath sounds. No wheezing.   Abdominal:      General: Abdomen is flat. There is no distension.      Palpations: Abdomen is soft.      Tenderness: There is no guarding.   Musculoskeletal:         General: No swelling. Normal range of motion.   Skin:     General: Skin is warm and dry.   Neurological:      General: No focal deficit present.      Mental Status: He is alert. Mental status is at baseline.   Psychiatric:         Mood and Affect: Mood normal.         Behavior: Behavior normal.         Outpatient Follow-Up  No future appointments.      Kwadwo Pack MD

## 2024-06-11 NOTE — CARE PLAN
The patient's goals for the shift include safety    The clinical goals for the shift include maintain safety

## 2024-06-11 NOTE — Clinical Note
Spoke with Ms. Tracy, pt's HC DPOA/financial DPOA. He has been working to get pt into [facility]. He has tried to contact pt's attny, to obtain a copy of pt's financial DPOA, however, she has not returned his calls. FLORESITA Jasmine

## 2024-06-11 NOTE — CARE PLAN
The patient's goals for the shift include safety    The clinical goals for the shift include maintaining hemodynamic stability

## 2024-06-11 NOTE — DISCHARGE INSTRUCTIONS
Needs 24/7 care and use of cane  Needs coumadin check/ INR in one week with your home coumadin clinic.

## 2024-06-12 ENCOUNTER — DOCUMENTATION (OUTPATIENT)
Dept: HOME HEALTH SERVICES | Facility: HOME HEALTH | Age: 79
End: 2024-06-12
Payer: MEDICARE

## 2024-06-12 ENCOUNTER — HOME HEALTH ADMISSION (OUTPATIENT)
Dept: HOME HEALTH SERVICES | Facility: HOME HEALTH | Age: 79
End: 2024-06-12
Payer: MEDICARE

## 2024-06-12 VITALS
OXYGEN SATURATION: 94 % | HEART RATE: 61 BPM | WEIGHT: 175.04 LBS | SYSTOLIC BLOOD PRESSURE: 108 MMHG | DIASTOLIC BLOOD PRESSURE: 57 MMHG | RESPIRATION RATE: 16 BRPM | HEIGHT: 69 IN | BODY MASS INDEX: 25.93 KG/M2 | TEMPERATURE: 97.5 F

## 2024-06-12 LAB
ANION GAP SERPL CALC-SCNC: 10 MMOL/L (ref 10–20)
BUN SERPL-MCNC: 34 MG/DL (ref 6–23)
CALCIUM SERPL-MCNC: 8.5 MG/DL (ref 8.6–10.3)
CHLORIDE SERPL-SCNC: 108 MMOL/L (ref 98–107)
CO2 SERPL-SCNC: 24 MMOL/L (ref 21–32)
CREAT SERPL-MCNC: 1.85 MG/DL (ref 0.5–1.3)
EGFRCR SERPLBLD CKD-EPI 2021: 37 ML/MIN/1.73M*2
ERYTHROCYTE [DISTWIDTH] IN BLOOD BY AUTOMATED COUNT: 15.1 % (ref 11.5–14.5)
GLUCOSE SERPL-MCNC: 94 MG/DL (ref 74–99)
HCT VFR BLD AUTO: 37 % (ref 41–52)
HGB BLD-MCNC: 12.5 G/DL (ref 13.5–17.5)
MAGNESIUM SERPL-MCNC: 2.02 MG/DL (ref 1.6–2.4)
MCH RBC QN AUTO: 32.1 PG (ref 26–34)
MCHC RBC AUTO-ENTMCNC: 33.8 G/DL (ref 32–36)
MCV RBC AUTO: 95 FL (ref 80–100)
NRBC BLD-RTO: 0 /100 WBCS (ref 0–0)
PLATELET # BLD AUTO: 127 X10*3/UL (ref 150–450)
POTASSIUM SERPL-SCNC: 4.2 MMOL/L (ref 3.5–5.3)
RBC # BLD AUTO: 3.9 X10*6/UL (ref 4.5–5.9)
SODIUM SERPL-SCNC: 138 MMOL/L (ref 136–145)
WBC # BLD AUTO: 5.6 X10*3/UL (ref 4.4–11.3)

## 2024-06-12 PROCEDURE — 80048 BASIC METABOLIC PNL TOTAL CA: CPT | Performed by: INTERNAL MEDICINE

## 2024-06-12 PROCEDURE — 83735 ASSAY OF MAGNESIUM: CPT | Performed by: INTERNAL MEDICINE

## 2024-06-12 PROCEDURE — 2500000001 HC RX 250 WO HCPCS SELF ADMINISTERED DRUGS (ALT 637 FOR MEDICARE OP): Performed by: NURSE PRACTITIONER

## 2024-06-12 PROCEDURE — 85027 COMPLETE CBC AUTOMATED: CPT | Performed by: INTERNAL MEDICINE

## 2024-06-12 PROCEDURE — 2500000002 HC RX 250 W HCPCS SELF ADMINISTERED DRUGS (ALT 637 FOR MEDICARE OP, ALT 636 FOR OP/ED): Mod: MUE | Performed by: INTERNAL MEDICINE

## 2024-06-12 PROCEDURE — 99232 SBSQ HOSP IP/OBS MODERATE 35: CPT | Performed by: INTERNAL MEDICINE

## 2024-06-12 PROCEDURE — 36415 COLL VENOUS BLD VENIPUNCTURE: CPT | Performed by: INTERNAL MEDICINE

## 2024-06-12 PROCEDURE — 2500000001 HC RX 250 WO HCPCS SELF ADMINISTERED DRUGS (ALT 637 FOR MEDICARE OP): Performed by: INTERNAL MEDICINE

## 2024-06-12 RX ADMIN — ALLOPURINOL 150 MG: 300 TABLET ORAL at 08:56

## 2024-06-12 RX ADMIN — DAPAGLIFLOZIN 5 MG: 5 TABLET, FILM COATED ORAL at 08:57

## 2024-06-12 RX ADMIN — FINASTERIDE 5 MG: 5 TABLET, FILM COATED ORAL at 08:56

## 2024-06-12 RX ADMIN — ATORVASTATIN CALCIUM 40 MG: 40 TABLET, FILM COATED ORAL at 08:56

## 2024-06-12 RX ADMIN — TAMSULOSIN HYDROCHLORIDE 0.4 MG: 0.4 CAPSULE ORAL at 08:56

## 2024-06-12 RX ADMIN — DOCUSATE SODIUM 100 MG: 100 CAPSULE, LIQUID FILLED ORAL at 08:56

## 2024-06-12 RX ADMIN — AMIODARONE HYDROCHLORIDE 200 MG: 200 TABLET ORAL at 08:57

## 2024-06-12 RX ADMIN — METOPROLOL SUCCINATE 50 MG: 50 TABLET, EXTENDED RELEASE ORAL at 08:56

## 2024-06-12 RX ADMIN — CHOLECALCIFEROL TAB 25 MCG (1000 UNIT) 2000 UNITS: 25 TAB at 08:56

## 2024-06-12 RX ADMIN — ISOSORBIDE DINITRATE 20 MG: 10 TABLET ORAL at 08:56

## 2024-06-12 ASSESSMENT — PAIN SCALES - GENERAL: PAINLEVEL_OUTOF10: 0 - NO PAIN

## 2024-06-12 NOTE — HH CARE COORDINATION
This referral has been made a Non Admit with  Home Care due to  Patient discharging to SNF . If you have further questions, feel free to reach out to our office at [phone]. Thank you, [facility] Intake.

## 2024-06-12 NOTE — CARE PLAN
The patient's goals for the shift include safety    The clinical goals for the shift include maintain safety    Over the shift, the patient did  make progress toward the following goals. Barriers to progression include none. Recommendations to address these barriers include none.

## 2024-06-12 NOTE — PROGRESS NOTES
Cheko Jin is a 78 y.o. male on day 11 of admission presenting with Hyponatremia.      Subjective   Up walking in room.  No complaints.  DC completed yesterday awaiting safe dc location.       Objective     Last Recorded Vitals  /57 (BP Location: Right arm, Patient Position: Lying)   Pulse 61   Temp 36.4 °C (97.5 °F) (Temporal)   Resp 16   Wt 79.4 kg (175 lb 0.7 oz) Comment: no new weight to assess  SpO2 94%   Intake/Output last 3 Shifts:  No intake or output data in the 24 hours ending 06/12/24 1040    Admission Weight  Weight: 79.4 kg (175 lb) (06/01/24 1601)    Daily Weight  06/11/24 : 79.4 kg (175 lb 0.7 oz)    Image Results  ECG 12 lead  Sinus bradycardia  Nonspecific T abnrm, anterolateral leads  Prolonged QT interval    See ED provider note for full interpretation and clinical correlation  Confirmed by Roshni Garsia ([id number]) on 6/5/2024 12:02:36 PM      Physical Exam  Vitals reviewed.   Constitutional:       Appearance: Normal appearance.   HENT:      Head: Normocephalic and atraumatic.      Mouth/Throat:      Mouth: Mucous membranes are moist.   Eyes:      Conjunctiva/sclera: Conjunctivae normal.   Cardiovascular:      Rate and Rhythm: Normal rate.      Pulses: Normal pulses.   Pulmonary:      Effort: Pulmonary effort is normal.      Breath sounds: Normal breath sounds. No wheezing.   Abdominal:      General: Abdomen is flat. There is no distension.      Palpations: Abdomen is soft.      Tenderness: There is no guarding.   Musculoskeletal:         General: No swelling. Normal range of motion.   Skin:     General: Skin is warm and dry.   Neurological:      General: No focal deficit present.      Mental Status: He is alert. Mental status is at baseline.   Psychiatric:         Mood and Affect: Mood normal.         Behavior: Behavior normal.         Relevant Results               Assessment/Plan      Principal Problem:    Hyponatremia   Patient admitted after trauma/ fall at home. Found to have  gluteus intramuscular hemorrage, C5 fracture, elevated INR, dementia.  Patient seen by neurology, neurosurgery, general surgery and psychiatry.  Coumadin was initially held and then resume and therapeutic prior to dc.  Neurology and neurosurgery evaluated and will follow up outpatient.  Psychiatry deemed patient did not have capacity, and patient will be discharged to home with POA with 24/7 care and needs cane for ambulation.     DC completed yesterday.  Remains stable for dc.  Resume coumadin, lasix.  Check am labs if remains in house tomorrow.      Kwadwo Pack MD

## 2024-09-19 ENCOUNTER — APPOINTMENT (OUTPATIENT)
Dept: NEUROLOGY | Facility: CLINIC | Age: 79
End: 2024-09-19
Payer: MEDICARE

## 2025-01-12 ENCOUNTER — APPOINTMENT (OUTPATIENT)
Dept: RADIOLOGY | Facility: HOSPITAL | Age: 80
End: 2025-01-12
Payer: MEDICARE

## 2025-01-12 ENCOUNTER — HOSPITAL ENCOUNTER (EMERGENCY)
Facility: HOSPITAL | Age: 80
Discharge: NURSING, REHAB OR LONG-TERM CARE | End: 2025-01-12
Payer: MEDICARE

## 2025-01-12 VITALS
DIASTOLIC BLOOD PRESSURE: 74 MMHG | WEIGHT: 170 LBS | HEIGHT: 68 IN | SYSTOLIC BLOOD PRESSURE: 156 MMHG | OXYGEN SATURATION: 96 % | HEART RATE: 56 BPM | RESPIRATION RATE: 18 BRPM | BODY MASS INDEX: 25.76 KG/M2 | TEMPERATURE: 98.4 F
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
## 2025-01-12 DIAGNOSIS — Z79.01 ANTICOAGULATED ON COUMADIN: ICD-10-CM

## 2025-01-12 DIAGNOSIS — Z51.81 SUBTHERAPEUTIC ANTICOAGULATION: ICD-10-CM

## 2025-01-12 DIAGNOSIS — S09.90XA INJURY OF HEAD, INITIAL ENCOUNTER: ICD-10-CM

## 2025-01-12 DIAGNOSIS — Z79.01 SUBTHERAPEUTIC ANTICOAGULATION: ICD-10-CM

## 2025-01-12 LAB
ALBUMIN SERPL BCP-MCNC: 3.4 G/DL (ref 3.4–5)
ALP SERPL-CCNC: 54 U/L (ref 33–136)
ALT SERPL W P-5'-P-CCNC: 7 U/L (ref 10–52)
ANION GAP SERPL CALC-SCNC: 13 MMOL/L (ref 10–20)
AST SERPL W P-5'-P-CCNC: 13 U/L (ref 9–39)
BASOPHILS # BLD AUTO: 0.02 X10*3/UL (ref 0–0.1)
BASOPHILS NFR BLD AUTO: 0.4 %
BILIRUB SERPL-MCNC: 0.6 MG/DL (ref 0–1.2)
BUN SERPL-MCNC: 23 MG/DL (ref 6–23)
CALCIUM SERPL-MCNC: 7.9 MG/DL (ref 8.6–10.3)
CHLORIDE SERPL-SCNC: 108 MMOL/L (ref 98–107)
CO2 SERPL-SCNC: 22 MMOL/L (ref 21–32)
CREAT SERPL-MCNC: 1.94 MG/DL (ref 0.5–1.3)
EGFRCR SERPLBLD CKD-EPI 2021: 35 ML/MIN/1.73M*2
EOSINOPHIL # BLD AUTO: 0.05 X10*3/UL (ref 0–0.4)
EOSINOPHIL NFR BLD AUTO: 0.9 %
ERYTHROCYTE [DISTWIDTH] IN BLOOD BY AUTOMATED COUNT: 15.4 % (ref 11.5–14.5)
GLUCOSE SERPL-MCNC: 106 MG/DL (ref 74–99)
HCT VFR BLD AUTO: 33 % (ref 41–52)
HGB BLD-MCNC: 10.1 G/DL (ref 13.5–17.5)
IMM GRANULOCYTES # BLD AUTO: 0.02 X10*3/UL (ref 0–0.5)
IMM GRANULOCYTES NFR BLD AUTO: 0.4 % (ref 0–0.9)
INR PPP: 1.5 (ref 0.9–1.1)
LYMPHOCYTES # BLD AUTO: 0.54 X10*3/UL (ref 0.8–3)
LYMPHOCYTES NFR BLD AUTO: 9.9 %
MCH RBC QN AUTO: 32.8 PG (ref 26–34)
MCHC RBC AUTO-ENTMCNC: 30.6 G/DL (ref 32–36)
MCV RBC AUTO: 107 FL (ref 80–100)
MONOCYTES # BLD AUTO: 0.54 X10*3/UL (ref 0.05–0.8)
MONOCYTES NFR BLD AUTO: 9.9 %
NEUTROPHILS # BLD AUTO: 4.28 X10*3/UL (ref 1.6–5.5)
NEUTROPHILS NFR BLD AUTO: 78.5 %
NRBC BLD-RTO: 0 /100 WBCS (ref 0–0)
PLATELET # BLD AUTO: 180 X10*3/UL (ref 150–450)
POTASSIUM SERPL-SCNC: 4.4 MMOL/L (ref 3.5–5.3)
PROT SERPL-MCNC: 5.9 G/DL (ref 6.4–8.2)
PROTHROMBIN TIME: 17.4 SECONDS (ref 9.8–12.8)
RBC # BLD AUTO: 3.08 X10*6/UL (ref 4.5–5.9)
SODIUM SERPL-SCNC: 139 MMOL/L (ref 136–145)
WBC # BLD AUTO: 5.5 X10*3/UL (ref 4.4–11.3)

## 2025-01-12 PROCEDURE — 80053 COMPREHEN METABOLIC PANEL: CPT | Performed by: PHYSICIAN ASSISTANT

## 2025-01-12 PROCEDURE — 70450 CT HEAD/BRAIN W/O DYE: CPT | Performed by: RADIOLOGY

## 2025-01-12 PROCEDURE — 36415 COLL VENOUS BLD VENIPUNCTURE: CPT | Performed by: PHYSICIAN ASSISTANT

## 2025-01-12 PROCEDURE — 70450 CT HEAD/BRAIN W/O DYE: CPT

## 2025-01-12 PROCEDURE — 99285 EMERGENCY DEPT VISIT HI MDM: CPT | Mod: 25

## 2025-01-12 PROCEDURE — 72125 CT NECK SPINE W/O DYE: CPT | Performed by: RADIOLOGY

## 2025-01-12 PROCEDURE — 85025 COMPLETE CBC W/AUTO DIFF WBC: CPT | Performed by: PHYSICIAN ASSISTANT

## 2025-01-12 PROCEDURE — G0390 TRAUMA RESPONS W/HOSP CRITI: HCPCS

## 2025-01-12 PROCEDURE — 85610 PROTHROMBIN TIME: CPT | Performed by: PHYSICIAN ASSISTANT

## 2025-01-12 PROCEDURE — 72125 CT NECK SPINE W/O DYE: CPT

## 2025-01-12 PROCEDURE — 99291 CRITICAL CARE FIRST HOUR: CPT | Performed by: PHYSICIAN ASSISTANT

## 2025-01-12 ASSESSMENT — PAIN - FUNCTIONAL ASSESSMENT
PAIN_FUNCTIONAL_ASSESSMENT: 0-10

## 2025-01-12 ASSESSMENT — PAIN SCALES - GENERAL
PAINLEVEL_OUTOF10: 0 - NO PAIN

## 2025-01-12 ASSESSMENT — LIFESTYLE VARIABLES
HAVE YOU EVER FELT YOU SHOULD CUT DOWN ON YOUR DRINKING: NO
EVER HAD A DRINK FIRST THING IN THE MORNING TO STEADY YOUR NERVES TO GET RID OF A HANGOVER: NO
HAVE PEOPLE ANNOYED YOU BY CRITICIZING YOUR DRINKING: NO
EVER FELT BAD OR GUILTY ABOUT YOUR DRINKING: NO
TOTAL SCORE: 0

## 2025-01-13 NOTE — ED PROVIDER NOTES
HPI   No chief complaint on file.      79-year-old male with a significant past medical history of CAD, CKD, gout, hyperlipidemia, Alzheimer's dementia, hypertension, and atrial fibs/flutter currently anticoagulated on Coumadin presents via EMS from a skilled nursing facility for a head injury.  The history present illness was somewhat convoluted.  Patient was reportedly found wandering outside the nursing facility. EMS suspected that he most likely fell.  He was found to have an abrasion to his forehead.  He was reported to be at his baseline mental status.  Unfortunately the patient was not a reliable source of information              Patient History   Past Medical History:   Diagnosis Date    Alzheimer's dementia (Multi)     Atrial flutter (Multi)     CAD (coronary artery disease)     nonobstructive    CKD (chronic kidney disease)     Dilated cardiomyopathy (Multi)     Gout     HFrEF (heart failure with reduced ejection fraction) (Multi)     Hypertension     Malignant neoplasm of bladder, unspecified (Multi)     NSVT (nonsustained ventricular tachycardia) (Multi)     Lifevest placed    Sleep apnea     Stroke (cerebrum) (Multi)     3 strokes R frontal, parietal lobes (July, 2010, September, 2010 and October, 2010), etiology embolic due to 40-50% ulcerated R ICA plaque s/p R CEA 10/12/10     Past Surgical History:   Procedure Laterality Date    HERNIA REPAIR Bilateral     TRANSURETHRAL RESECTION OF PROSTATE       Family History   Problem Relation Name Age of Onset    No Known Problems Mother      No Known Problems Father       Social History     Tobacco Use    Smoking status: Never     Passive exposure: Never    Smokeless tobacco: Never   Vaping Use    Vaping status: Never Used   Substance Use Topics    Alcohol use: Not Currently    Drug use: Defer       Physical Exam   ED Triage Vitals   Temp Pulse Resp BP   -- -- -- --      SpO2 Temp src Heart Rate Source Patient Position   -- -- -- --      BP Location FiO2 (%)      -- --       Physical Exam  Vitals and nursing note reviewed.   Constitutional:       General: He is not in acute distress.     Appearance: Normal appearance. He is normal weight. He is not ill-appearing, toxic-appearing or diaphoretic.   HENT:      Head: Normocephalic.      Nose: Nose normal.      Mouth/Throat:      Mouth: Mucous membranes are moist.   Neck:      Comments: No midline cervical thoracic or lumbar spine tenderness on exam  Cardiovascular:      Rate and Rhythm: Normal rate and regular rhythm.      Pulses: Normal pulses.   Pulmonary:      Effort: Pulmonary effort is normal. No respiratory distress.      Breath sounds: Normal breath sounds.   Abdominal:      General: Abdomen is flat. There is no distension.      Palpations: Abdomen is soft.      Tenderness: There is no abdominal tenderness. There is no guarding or rebound.   Musculoskeletal:         General: Normal range of motion.      Cervical back: Normal range of motion and neck supple.      Comments: No direct bony tenderness on evaluation of the extremities.  The patient is able to move all extremities without difficulty   Skin:     General: Skin is warm and dry.      Capillary Refill: Capillary refill takes less than 2 seconds.      Comments: Abrasion noted to the right forehead   Neurological:      General: No focal deficit present.      Mental Status: He is alert.      Comments: Alert and oriented to self and place   Psychiatric:         Mood and Affect: Mood normal.         Behavior: Behavior normal.         Thought Content: Thought content normal.         Judgment: Judgment normal.           ED Course & MDM   ED Course as of 01/12/25 2021   Sun Jan 12, 2025 2010 IMPRESSION:  No acute intracranial abnormality.      No acute fracture or traumatic subluxation of the cervical spine.      Cervical degenerative changes as above.   [DS]   2015 WBC: 5.5 [DS]   2015 Hemoglobin slightly decreased from most recent at 10.1.  Most recent was about 7  months ago. [DS]   2015 Creatinine 1.9 for which appears to be around baseline [DS]   2015 INR(!): 1.5 [DS]      ED Course User Index  [DS] Lewis Constantino PA-C         Diagnoses as of 01/12/25 2021   Fall, initial encounter   Subtherapeutic anticoagulation   Injury of head, initial encounter   Anticoagulated on Coumadin                 No data recorded                                 Medical Decision Making    Medical Decision Making & ED Course  Medical Decision Making:  Trauma activation was called due to the patient's head injury on anticoagulation.  The patient is currently on Coumadin.  Patient has a history of dementia Alzheimer's.  He states that a care facility.  The patient reportedly fell outside today after wandering outside of the care facility.  Patient was found to have an abrasion to the right forehead.  This was cleaned by nursing staff.  His tetanus status was updated.  CT imaging of the head and cervical spine was ordered and found to be negative.  There is no direct bony tenderness appreciated on exam.  Unfortunate the patient is not a reliable source of information.  He is however alert and oriented to self and place and rather pleasant.  Basic lab work was obtained revealing no evidence of leukocytosis.  Hemoglobin slightly decreased from most recent at 10.1.  INR subtherapeutic at 1.5.  Creatinine around baseline.  At this time the trauma activation was downgraded.  At this point I believe the patient is appropriate for discharge.  Discussed head injury instructions with the patient and/or family/friend present.  Recommended a trusted person check on the patient several times over the next 24 hours.  Instructed patient and family/friend to return to hospital with increasing headache, repeated vomiting, weakness, clumsiness, drowsiness, or fluid from the nose or ear that might represent a leak of cerebrospinal fluid.  Headache and irritability are common for a day or more after concussion,  particularly in children.  Recommended that they not resume normal activity until they are free of headache and dizziness.  Post-concussive syndrome consists of a constellation of symptoms, mainly headache, dizziness, and trouble concentrating, in the days and weeks following concussion.   Patient will be transported back to the care facility.  Care instructions will be provided by nursing staff  --  Scoring Tools Utilized: [place] CT rule    Differential diagnoses considered include but are not limited to: Intracranial hemorrhage, skull fracture, concussion     Social Determinants of Health which Significantly Impact Care: None The following actions were taken to address these social determinants: None    EKG Independent Interpretation: EKG not obtained    Independent Result Review and Interpretation: CT head as interpreted by me revealed no evidence of acute traumatic injury    Chronic conditions affecting the patient's care: As documented above in [facility]    The patient was discussed with the following consultants/services: None    Care Considerations: As documented above in [facility]    ED Course:  ED Course as of 01/12/25 2016  ------------------------------------------------------------  Time: 01/12 2010  Comment: IMPRESSION:No acute intracranial abnormality.  No acute fracture or traumatic subluxation of the cervical spine.  Cervical degenerative changes as above.  By: Lewis Constantino PA-C  ------------------------------------------------------------  Time: 01/12 2015  Value: WBC: 5.5  Comment: (Reviewed)  By: Lewis Constantino PA-C  ------------------------------------------------------------  Time: 01/12 2015  Comment: Hemoglobin slightly decreased from most recent at 10.1.  Most recent was about 7 months ago.  By: Lewis Constantino PA-C  ------------------------------------------------------------  Time: 01/12 2015  Comment: Creatinine 1.9 for which appears to be around baseline  By: Lewis Constantino,  GERMAIN  ------------------------------------------------------------  Time: 01/12 2015  Value: INR(!): 1.5  Comment: (Reviewed)  By: Lewis Constantino PA-C     Disposition  As a result of the work-up, the patient was discharged home.  he was informed of his diagnosis and instructed to come back with any concerns or worsening of condition.  he and was agreeable to the plan as discussed above.  he was given the opportunity to ask questions.  All of the patient's questions were answered.      Patient was seen independently    Lewis Constantino PA-C  Emergency Medicine            Procedure  Critical Care    Performed by: Lewis Constantino PA-C  Authorized by: Lewis Constantino PA-C    Critical care provider statement:     Critical care time (minutes):  60    Critical care start time:  1/12/2025 7:10 PM    Critical care end time:  1/12/2025 8:10 PM    Critical care was necessary to treat or prevent imminent or life-threatening deterioration of the following conditions:  Trauma    Critical care was time spent personally by me on the following activities:  Review of old charts, ordering and review of laboratory studies, ordering and review of radiographic studies, pulse oximetry, re-evaluation of patient's condition and examination of patient       Lewis Constantino PA-C  01/12/25 2021

## 2025-01-13 NOTE — DISCHARGE INSTRUCTIONS
Your Coumadin level was found to be low.  You should follow-up with your primary care physician for possible adjustment.  Have your Coumadin level rechecked in the next few days

## 2025-01-13 NOTE — ED TRIAGE NOTES
BIBA for a fall, patient was found wandering and fell and hit his head, patient has a laceration to the right forehead above the eye. HIA called. +eliquis +hit head

## 2025-02-23 ENCOUNTER — APPOINTMENT (OUTPATIENT)
Dept: RADIOLOGY | Facility: HOSPITAL | Age: 80
End: 2025-02-23
Payer: MEDICARE

## 2025-02-23 ENCOUNTER — HOSPITAL ENCOUNTER (EMERGENCY)
Facility: HOSPITAL | Age: 80
Discharge: HOME | End: 2025-02-23
Attending: EMERGENCY MEDICINE
Payer: MEDICARE

## 2025-02-23 VITALS
WEIGHT: 175 LBS | DIASTOLIC BLOOD PRESSURE: 83 MMHG | TEMPERATURE: 97.7 F | RESPIRATION RATE: 42 BRPM | HEART RATE: 61 BPM | SYSTOLIC BLOOD PRESSURE: 179 MMHG | BODY MASS INDEX: 26.61 KG/M2 | OXYGEN SATURATION: 96 %

## 2025-02-23 DIAGNOSIS — S73.014A: Primary | ICD-10-CM

## 2025-02-23 LAB
ABO GROUP (TYPE) IN BLOOD: NORMAL
ALBUMIN SERPL BCP-MCNC: 3.7 G/DL (ref 3.4–5)
ALP SERPL-CCNC: 54 U/L (ref 33–136)
ALT SERPL W P-5'-P-CCNC: 7 U/L (ref 10–52)
ANION GAP SERPL CALC-SCNC: 10 MMOL/L (ref 10–20)
ANTIBODY SCREEN: NORMAL
AST SERPL W P-5'-P-CCNC: 11 U/L (ref 9–39)
BASOPHILS # BLD AUTO: 0.03 X10*3/UL (ref 0–0.1)
BASOPHILS NFR BLD AUTO: 0.6 %
BILIRUB SERPL-MCNC: 0.9 MG/DL (ref 0–1.2)
BUN SERPL-MCNC: 25 MG/DL (ref 6–23)
CALCIUM SERPL-MCNC: 9 MG/DL (ref 8.6–10.3)
CHLORIDE SERPL-SCNC: 108 MMOL/L (ref 98–107)
CO2 SERPL-SCNC: 28 MMOL/L (ref 21–32)
CREAT SERPL-MCNC: 1.85 MG/DL (ref 0.5–1.3)
EGFRCR SERPLBLD CKD-EPI 2021: 37 ML/MIN/1.73M*2
EOSINOPHIL # BLD AUTO: 0.03 X10*3/UL (ref 0–0.4)
EOSINOPHIL NFR BLD AUTO: 0.6 %
ERYTHROCYTE [DISTWIDTH] IN BLOOD BY AUTOMATED COUNT: 14.1 % (ref 11.5–14.5)
ETHANOL SERPL-MCNC: <10 MG/DL
GLUCOSE SERPL-MCNC: 91 MG/DL (ref 74–99)
HCT VFR BLD AUTO: 37.5 % (ref 41–52)
HGB BLD-MCNC: 12.3 G/DL (ref 13.5–17.5)
IMM GRANULOCYTES # BLD AUTO: 0.02 X10*3/UL (ref 0–0.5)
IMM GRANULOCYTES NFR BLD AUTO: 0.4 % (ref 0–0.9)
INR PPP: 1.3 (ref 0.9–1.1)
LACTATE SERPL-SCNC: 0.8 MMOL/L (ref 0.4–2)
LYMPHOCYTES # BLD AUTO: 0.34 X10*3/UL (ref 0.8–3)
LYMPHOCYTES NFR BLD AUTO: 6.5 %
MCH RBC QN AUTO: 31.7 PG (ref 26–34)
MCHC RBC AUTO-ENTMCNC: 32.8 G/DL (ref 32–36)
MCV RBC AUTO: 97 FL (ref 80–100)
MONOCYTES # BLD AUTO: 0.28 X10*3/UL (ref 0.05–0.8)
MONOCYTES NFR BLD AUTO: 5.4 %
NEUTROPHILS # BLD AUTO: 4.5 X10*3/UL (ref 1.6–5.5)
NEUTROPHILS NFR BLD AUTO: 86.5 %
NRBC BLD-RTO: 0 /100 WBCS (ref 0–0)
PLATELET # BLD AUTO: 161 X10*3/UL (ref 150–450)
POTASSIUM SERPL-SCNC: 4.2 MMOL/L (ref 3.5–5.3)
PROT SERPL-MCNC: 6.6 G/DL (ref 6.4–8.2)
PROTHROMBIN TIME: 15.1 SECONDS (ref 9.8–12.8)
RBC # BLD AUTO: 3.88 X10*6/UL (ref 4.5–5.9)
RH FACTOR (ANTIGEN D): NORMAL
SODIUM SERPL-SCNC: 142 MMOL/L (ref 136–145)
WBC # BLD AUTO: 5.2 X10*3/UL (ref 4.4–11.3)

## 2025-02-23 PROCEDURE — 82077 ASSAY SPEC XCP UR&BREATH IA: CPT

## 2025-02-23 PROCEDURE — 85025 COMPLETE CBC W/AUTO DIFF WBC: CPT

## 2025-02-23 PROCEDURE — 2550000001 HC RX 255 CONTRASTS: Performed by: EMERGENCY MEDICINE

## 2025-02-23 PROCEDURE — 73501 X-RAY EXAM HIP UNI 1 VIEW: CPT | Mod: RT

## 2025-02-23 PROCEDURE — 73560 X-RAY EXAM OF KNEE 1 OR 2: CPT | Mod: RT

## 2025-02-23 PROCEDURE — 73610 X-RAY EXAM OF ANKLE: CPT | Mod: RIGHT SIDE | Performed by: RADIOLOGY

## 2025-02-23 PROCEDURE — 73552 X-RAY EXAM OF FEMUR 2/>: CPT | Performed by: RADIOLOGY

## 2025-02-23 PROCEDURE — G0390 TRAUMA RESPONS W/HOSP CRITI: HCPCS

## 2025-02-23 PROCEDURE — 72131 CT LUMBAR SPINE W/O DYE: CPT | Performed by: RADIOLOGY

## 2025-02-23 PROCEDURE — 71260 CT THORAX DX C+: CPT | Performed by: RADIOLOGY

## 2025-02-23 PROCEDURE — 72125 CT NECK SPINE W/O DYE: CPT

## 2025-02-23 PROCEDURE — 27265 TREAT HIP DISLOCATION: CPT

## 2025-02-23 PROCEDURE — 2500000004 HC RX 250 GENERAL PHARMACY W/ HCPCS (ALT 636 FOR OP/ED): Performed by: EMERGENCY MEDICINE

## 2025-02-23 PROCEDURE — 36415 COLL VENOUS BLD VENIPUNCTURE: CPT

## 2025-02-23 PROCEDURE — 72128 CT CHEST SPINE W/O DYE: CPT | Mod: RCN

## 2025-02-23 PROCEDURE — 85610 PROTHROMBIN TIME: CPT

## 2025-02-23 PROCEDURE — 73560 X-RAY EXAM OF KNEE 1 OR 2: CPT | Mod: RIGHT SIDE | Performed by: RADIOLOGY

## 2025-02-23 PROCEDURE — 72170 X-RAY EXAM OF PELVIS: CPT

## 2025-02-23 PROCEDURE — 99152 MOD SED SAME PHYS/QHP 5/>YRS: CPT

## 2025-02-23 PROCEDURE — 73552 X-RAY EXAM OF FEMUR 2/>: CPT | Mod: RT

## 2025-02-23 PROCEDURE — 83605 ASSAY OF LACTIC ACID: CPT

## 2025-02-23 PROCEDURE — 72128 CT CHEST SPINE W/O DYE: CPT | Performed by: RADIOLOGY

## 2025-02-23 PROCEDURE — 72131 CT LUMBAR SPINE W/O DYE: CPT | Mod: RCN

## 2025-02-23 PROCEDURE — 70450 CT HEAD/BRAIN W/O DYE: CPT | Performed by: RADIOLOGY

## 2025-02-23 PROCEDURE — 99285 EMERGENCY DEPT VISIT HI MDM: CPT | Mod: 25 | Performed by: EMERGENCY MEDICINE

## 2025-02-23 PROCEDURE — 74177 CT ABD & PELVIS W/CONTRAST: CPT | Performed by: RADIOLOGY

## 2025-02-23 PROCEDURE — 71260 CT THORAX DX C+: CPT

## 2025-02-23 PROCEDURE — 72125 CT NECK SPINE W/O DYE: CPT | Performed by: RADIOLOGY

## 2025-02-23 PROCEDURE — 73610 X-RAY EXAM OF ANKLE: CPT | Mod: RT

## 2025-02-23 PROCEDURE — 84075 ASSAY ALKALINE PHOSPHATASE: CPT

## 2025-02-23 PROCEDURE — 86900 BLOOD TYPING SEROLOGIC ABO: CPT

## 2025-02-23 PROCEDURE — 72170 X-RAY EXAM OF PELVIS: CPT | Performed by: RADIOLOGY

## 2025-02-23 PROCEDURE — 70450 CT HEAD/BRAIN W/O DYE: CPT

## 2025-02-23 RX ORDER — ACETAMINOPHEN 325 MG/1
650 TABLET ORAL EVERY 4 HOURS PRN
COMMUNITY

## 2025-02-23 RX ORDER — ADHESIVE BANDAGE
30 BANDAGE TOPICAL DAILY PRN
COMMUNITY

## 2025-02-23 RX ORDER — RIVASTIGMINE 4.6 MG/24H
4.6 PATCH, EXTENDED RELEASE TRANSDERMAL DAILY
COMMUNITY

## 2025-02-23 RX ORDER — PROPOFOL 10 MG/ML
100 INJECTION, EMULSION INTRAVENOUS ONCE
Status: DISCONTINUED | OUTPATIENT
Start: 2025-02-23 | End: 2025-02-23 | Stop reason: HOSPADM

## 2025-02-23 RX ORDER — PROPOFOL 10 MG/ML
INJECTION, EMULSION INTRAVENOUS CODE/TRAUMA/SEDATION MEDICATION
Status: COMPLETED | OUTPATIENT
Start: 2025-02-23 | End: 2025-02-23

## 2025-02-23 RX ORDER — METOPROLOL SUCCINATE 25 MG/1
25 TABLET, EXTENDED RELEASE ORAL DAILY
COMMUNITY

## 2025-02-23 RX ORDER — LOPERAMIDE HCL 2 MG
1-2 TABLET ORAL 4 TIMES DAILY PRN
COMMUNITY

## 2025-02-23 RX ORDER — ASPIRIN 81 MG/1
81 TABLET ORAL DAILY
COMMUNITY

## 2025-02-23 RX ORDER — TALC
3 POWDER (GRAM) TOPICAL NIGHTLY
COMMUNITY

## 2025-02-23 RX ORDER — DIVALPROEX SODIUM 250 MG/1
250 TABLET, DELAYED RELEASE ORAL 2 TIMES DAILY
COMMUNITY

## 2025-02-23 RX ADMIN — PROPOFOL 50 MG: 10 INJECTION, EMULSION INTRAVENOUS at 12:47

## 2025-02-23 RX ADMIN — PROPOFOL 50 MG: 10 INJECTION, EMULSION INTRAVENOUS at 13:02

## 2025-02-23 RX ADMIN — IOHEXOL 100 ML: 350 INJECTION, SOLUTION INTRAVENOUS at 11:45

## 2025-02-23 RX ADMIN — PROPOFOL 20 MG: 10 INJECTION, EMULSION INTRAVENOUS at 12:48

## 2025-02-23 ASSESSMENT — COLUMBIA-SUICIDE SEVERITY RATING SCALE - C-SSRS
2. HAVE YOU ACTUALLY HAD ANY THOUGHTS OF KILLING YOURSELF?: NO
6. HAVE YOU EVER DONE ANYTHING, STARTED TO DO ANYTHING, OR PREPARED TO DO ANYTHING TO END YOUR LIFE?: NO
1. IN THE PAST MONTH, HAVE YOU WISHED YOU WERE DEAD OR WISHED YOU COULD GO TO SLEEP AND NOT WAKE UP?: NO

## 2025-02-23 ASSESSMENT — PAIN - FUNCTIONAL ASSESSMENT
PAIN_FUNCTIONAL_ASSESSMENT: 0-10
PAIN_FUNCTIONAL_ASSESSMENT: 0-10

## 2025-02-23 ASSESSMENT — PAIN DESCRIPTION - ORIENTATION: ORIENTATION: RIGHT

## 2025-02-23 ASSESSMENT — PAIN SCALES - GENERAL
PAINLEVEL_OUTOF10: 7
PAINLEVEL_OUTOF10: 0 - NO PAIN
PAINLEVEL_OUTOF10: 0 - NO PAIN
PAINLEVEL_OUTOF10: 7

## 2025-02-23 ASSESSMENT — PAIN DESCRIPTION - PAIN TYPE: TYPE: ACUTE PAIN

## 2025-02-23 ASSESSMENT — PAIN DESCRIPTION - LOCATION
LOCATION: HIP
LOCATION: HIP

## 2025-02-23 NOTE — PROGRESS NOTES
Pharmacy Medication History Review    Cheko Jin is a 79 y.o. male admitted for No Principal Problem: There is no principal problem currently on the Problem List. Please update the Problem List and refresh.. Pharmacy reviewed the patient's [id number] medications and allergies for accuracy.    The list below reflectives the updated PTA list. Please review each medication in order reconciliation for additional clarification and justification.  Prior to Admission medications    Medication Sig Start Date End Date Authorizing Provider   apixaban (Eliquis) 5 mg tablet Take 1 tablet (5 mg) by mouth twice a day.   Historical Provider, MD   atorvastatin (Lipitor) 40 mg tablet Take 1 tablet (40 mg) by mouth once daily at bedtime.   Kwadwo Pack MD   divalproex (Depakote) 250 mg EC tablet Take 1 tablet (250 mg) by mouth twice a day.   Historical Provider, MD   metoprolol succinate XL (Toprol-XL) 25 mg 24 hr tablet Take 1 tablet (25 mg) by mouth once daily.   Historical Provider, MD   rivastigmine (Exelon) 4.6 mg/24 hour Place 4.6 mg on the skin once daily.   Historical Provider, MD   acetaminophen (Tylenol) 325 mg tablet Take 2 tablets (650 mg) by mouth every 4 hours if needed for mild pain (1 - 3) or fever (temp greater than 38.0 C).   Historical Provider, MD   allopurinol (Zyloprim) 300 mg tablet Take 0.5 tablets (150 mg) by mouth once daily.   Historical Provider, MD   amiodarone (Pacerone) 200 mg tablet Take 1 tablet (200 mg) by mouth once daily.   Historical Provider, MD   aspirin 81 mg EC tablet Take 1 tablet (81 mg) by mouth once daily.   Historical Provider, MD   cholecalciferol (Vitamin D-3) 50 MCG (2000 UT) tablet Take 1 tablet (50 mcg) by mouth once daily.   Historical Provider, MD   finasteride (Proscar) 5 mg tablet Take 1 tablet (5 mg) by mouth once daily.   Historical Provider, MD   loperamide (Imodium A-D) 2 mg tablet Take 1-2 tablets (2-4 mg) by mouth 4 times a day as needed for diarrhea.    Historical Provider, MD   magnesium hydroxide (Milk of Magnesia) 400 mg/5 mL suspension Take 30 mL by mouth once daily as needed for constipation.   Historical Provider, MD   melatonin 3 mg tablet Take 1 tablet (3 mg) by mouth once daily at bedtime.   Historical Provider, MD   multivitamin with minerals tablet Take 1 tablet by mouth once daily.   Historical Provider, MD   tamsulosin (Flomax) 0.4 mg 24 hr capsule Take 1 capsule (0.4 mg) by mouth once daily at bedtime.   Historical Provider, MD        The list below reflectives the updated allergy list. Please review each documented allergy for additional clarification and justification.  Allergies  Reviewed by Kalie Montaño RN on 2/23/2025        Severity Reactions Comments    Trazodone Medium Insomnia     Cetirizine Low Myalgia     Isosorbide Low Headache     Levofloxacin Low Constipation     Lisinopril Low Cough     Loratadine Low Myalgia     Statins-hmg-coa Reductase Inhibitors Low Myalgia             Below are additional concerns with the patient's PTA list.      Meenakshi Hines

## 2025-02-23 NOTE — ED TRIAGE NOTES
Patient BIBA from [facility] for a suspected right Hip injury. Patient was found in bed by facility staff, when they noticed an inward rotation of the RLE and patient was noted to be endorsing right hip pain with movement. Facility is states patient has no recently documented falls. Patient denies recent falls on arrival, and endorses right hp pain 8/10. Presents with right hip rotation and right leg shortening.

## 2025-02-23 NOTE — ED PROCEDURE NOTE
Procedure  Orthopaedic Injury Treatment - Lower Extremity    Performed by: Renee Barry DO  Authorized by: Lux Juarez MD    Consent:     Consent obtained:  Written    Consent given by:  Patient and healthcare agent    Risks, benefits, and alternatives were discussed: yes      Risks discussed:  Restricted joint movement, vascular damage, recurrent dislocation and irreducible dislocation    Alternatives discussed:  No treatment, delayed treatment, referral and immobilization  Universal protocol:     Procedure explained and questions answered to patient or proxy's satisfaction: yes      Relevant documents present and verified: yes      Test results available: yes      Imaging studies available: yes      Required blood products, implants, devices, and special equipment available: yes      Site/side marked: yes      Immediately prior to procedure, a time out was called: yes      Patient identity confirmed:  Verbally with patient and arm band  Location:     Location:  Hip    Hip injury location:  R hip    Hip dislocation type: posterior      Etiology: spontaneous      Prosthesis: yes    Pre-procedure details:     Distal perfusion: normal      Range of motion: reduced    Sedation:     Sedation type:  Moderate sedation  Anesthesia:     Anesthesia method:  None  Procedure details:     Manipulation performed: yes      Hip reduction method:  Allis maneuver    Reduction successful: yes      Reduction confirmed with imaging: yes    Post-procedure details:     Neurological function: normal      Distal perfusion: normal      Range of motion: improved      Procedure completion:  Tolerated  Moderate Sedation    Performed by: Renee Barry DO  Authorized by: Lux Juarez MD    Consent:     Consent obtained:  Verbal    Consent given by:  Patient and healthcare agent    Risks, benefits, and alternatives were discussed: yes      Risks discussed:  Prolonged hypoxia resulting in organ damage, prolonged sedation necessitating  reversal, inadequate sedation and respiratory compromise necessitating ventilatory assistance and intubation    Alternatives discussed:  Anxiolysis and regional anesthesia  [place] protocol:     Protocol observed: The universal protocol was observed before the procedure and is documented in the nursing flowsheets    Indications:     Procedure performed:  Dislocation reduction    Procedure necessitating sedation performed by:  Physician performing sedation    Level of sedation:  Moderate  Pre-sedation assessment:     Mouth opening:  3 or more finger widths    Thyromental distance:  4 finger widths    Mallampati score:  I - soft palate, uvula, fauces, pillars visible    Neck mobility: normal      History of difficult intubation: no    Immediate pre-procedure details:     Monitoring: The patient is on appropriate monitoring (Including: 3 or 5 lead EKG, Pulse Oximetry, Capnography, and Blood Pressure monitoring), oxygenation has been addressed, and critical airway and emergency equipment is immediately available before the initiation of sedation      Reassessment: Patient reassessed immediately prior to procedure      Reviewed: NPO status    Procedure details (see MAR for exact dosages):     Sedation start time:  2/23/2025 12:45 PM    Intra-procedure events: hypoxemia: oxygen saturation < 85% or 10% drop in baseline    Intra-procedure management:  Airway repositioning    Sedation end time:  2/23/2025 1:20 PM    Total sedation time (minutes):  35  Post-procedure details:     Attendance: Constant attendance by certified staff until patient recovered      Procedure completion:  Tolerated               Renee Barry DO  Resident  02/23/25 2594

## 2025-02-23 NOTE — ED PROVIDER NOTES
Emergency Department Provider Note        History of Present Illness     History provided by: Patient  Limitations to History: None  External Records Reviewed with Brief Summary: I reviewed prior ED visits, Care Everywhere, discharge summaries and outpatient records as appropriate.       HPI:  Cheko Jin is a 79 y.o. male past medical history of CAD, CKD, gout, hyperlipidemia, Alzheimer's dementia, hypertension, and atrial fibs/flutter currently anticoagulated on Eliquis presenting to the emergency department with concern for a possible hip fracture.  Patient denies falls, the nursing home who sent him in to be evaluated also denies any falls.  States they found him in bed with a shortened and internally rotated right lower extremity.  Patient denies any pain or injury states that however when he moves his hip or attempts to move his hip it causes severe pain.  States he did not fall, denies head strike or loss of consciousness.  Patient is an unreliable historian secondary to advanced dementia.  Has no obvious evidence of trauma on exam.    Physical Exam   Triage vitals:  T 36.5 °C (97.7 °F)  HR 56  /81  RR 16  O2 98 % None (Room air)    General: Awake, alert, in no acute distress  Eyes: Gaze conjugate.  No scleral icterus or injection  HENT: Normo-cephalic, atraumatic. No stridor  CV: Regular rate, regular rhythm. Radial pulses 2+ bilaterally  Resp: Breathing non-labored, speaking in full sentences.  Clear to auscultation bilaterally  GI: Soft, non-distended, non-tender. No rebound or guarding.  MSK/Extremities: Right hip is internally rotated and somewhat shortened when compared to the left.  Bilateral DP and PT pulses.  Sensation intact.  Passive range of motion intact.  Tenderness with active range of motion.  Pelvis is stable to compression.  Skin: Warm. Appropriate color  Neuro: Alert. Oriented. Face symmetric. Speech is fluent.  Gross strength and sensation intact in b/l UE and LEs  Psych:  Appropriate mood and affect    Medical Decision Making & ED Course   Medical Decision Makin y.o. male presenting to the emergency department with concern for right hip injury.  On physical exam, patient is overall hemodynamically stable and very well-appearing, has some internal rotation and shortening of his right lower extremity with tender to attempt at passive range of motion.  Denies any falls or trauma does not have any evidence of injury on exam however he does have advanced dementia and there could have been a possible fall receiving him resulting in injury.  Will obtain pan scans to rule out additional or distracting injury.  Review of imaging shows no evidence of intracranial trauma, no trauma to the CT or L-spine, no intra-abdominal intrathoracic injury however patient does have a right posterior periprosthetic hip dislocation.  It is unclear how long patient has had a dislocated hip.  Both patient and the patient's power of  were updated with results and consented for a procedural sedation for reduction attempt.  See procedure notes for full detail.    Patient's right hip was successfully reduced and confirmed on x-ray imaging.  On reevaluation, patient is pain-free, has full range of motion of his hip, continues to have distal pulses and sensation intact.  Will maintain patient in a knee immobilizer to allow hip to recover, will provide patient with orthopedic outpatient follow-up.  Do not feel that further workup indicated at this time. Discussed results, diagnosis/differential, and plan with patient. Patient advised to follow up with primary physician in 2-3 days. Discussed return precautions and encouraged patient to return to the Emergency Department for any concerning symptoms or worsening condition. Patient expresses understanding and is in agreement. All questions answered. Patient discharged in stable condition.  ----     Social Determinants of Health which Significantly Impact  Care: None identified     EKG Independent Interpretation: EKG interpreted by myself. Please see ED Course for full interpretation.    Independent Result Review and Interpretation: Relevant laboratory and radiographic results were reviewed and independently interpreted by myself.  As necessary, they are commented on in the ED Course.    Chronic conditions affecting the patient's care: As documented above in [facility]    The patient was discussed with the following consultants/services: None    Care Considerations: As documented above in [facility]    ED Course:  Diagnoses as of 02/24/25 0313   Closed posterior dislocation of right hip, initial encounter (Multi)     Disposition   As a result of the work-up, the patient was discharged home.  he was informed of his diagnosis and instructed to come back with any concerns or worsening of condition.  he and was agreeable to the plan as discussed above.  he was given the opportunity to ask questions.  All of the patient's questions were answered.    Procedures   Procedures    Patient seen and discussed with ED attending physician.    Renee Barry DO  Emergency Medicine       Renee Barry DO  Resident  02/24/25 0313

## 2025-02-25 ENCOUNTER — HOSPITAL ENCOUNTER (EMERGENCY)
Facility: HOSPITAL | Age: 80
Discharge: HOME | End: 2025-02-25
Attending: STUDENT IN AN ORGANIZED HEALTH CARE EDUCATION/TRAINING PROGRAM
Payer: MEDICARE

## 2025-02-25 ENCOUNTER — APPOINTMENT (OUTPATIENT)
Dept: RADIOLOGY | Facility: HOSPITAL | Age: 80
End: 2025-02-25
Payer: MEDICARE

## 2025-02-25 ENCOUNTER — HOSPITAL ENCOUNTER (OUTPATIENT)
Dept: CARDIOLOGY | Facility: HOSPITAL | Age: 80
Discharge: HOME | End: 2025-02-25
Payer: MEDICARE

## 2025-02-25 VITALS
BODY MASS INDEX: 26.61 KG/M2 | HEART RATE: 62 BPM | TEMPERATURE: 97.9 F | DIASTOLIC BLOOD PRESSURE: 77 MMHG | OXYGEN SATURATION: 94 % | WEIGHT: 175 LBS | SYSTOLIC BLOOD PRESSURE: 163 MMHG | RESPIRATION RATE: 20 BRPM
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
## 2025-02-25 DIAGNOSIS — S09.90XA HEAD INJURY, INITIAL ENCOUNTER: ICD-10-CM
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
## 2025-02-25 LAB
ALBUMIN SERPL BCP-MCNC: 3.6 G/DL (ref 3.4–5)
ALP SERPL-CCNC: 47 U/L (ref 33–136)
ALT SERPL W P-5'-P-CCNC: 7 U/L (ref 10–52)
ANION GAP SERPL CALC-SCNC: 12 MMOL/L (ref 10–20)
APTT PPP: 32 SECONDS (ref 26–36)
AST SERPL W P-5'-P-CCNC: 12 U/L (ref 9–39)
BASOPHILS # BLD AUTO: 0.02 X10*3/UL (ref 0–0.1)
BASOPHILS NFR BLD AUTO: 0.4 %
BILIRUB SERPL-MCNC: 0.9 MG/DL (ref 0–1.2)
BUN SERPL-MCNC: 33 MG/DL (ref 6–23)
CALCIUM SERPL-MCNC: 8.6 MG/DL (ref 8.6–10.3)
CHLORIDE SERPL-SCNC: 106 MMOL/L (ref 98–107)
CO2 SERPL-SCNC: 24 MMOL/L (ref 21–32)
CREAT SERPL-MCNC: 2.47 MG/DL (ref 0.5–1.3)
EGFRCR SERPLBLD CKD-EPI 2021: 26 ML/MIN/1.73M*2
EOSINOPHIL # BLD AUTO: 0.08 X10*3/UL (ref 0–0.4)
EOSINOPHIL NFR BLD AUTO: 1.5 %
ERYTHROCYTE [DISTWIDTH] IN BLOOD BY AUTOMATED COUNT: 14.1 % (ref 11.5–14.5)
GLUCOSE SERPL-MCNC: 95 MG/DL (ref 74–99)
HCT VFR BLD AUTO: 35.9 % (ref 41–52)
HGB BLD-MCNC: 11.5 G/DL (ref 13.5–17.5)
IMM GRANULOCYTES # BLD AUTO: 0.01 X10*3/UL (ref 0–0.5)
IMM GRANULOCYTES NFR BLD AUTO: 0.2 % (ref 0–0.9)
INR PPP: 1.6 (ref 0.9–1.1)
LYMPHOCYTES # BLD AUTO: 0.32 X10*3/UL (ref 0.8–3)
LYMPHOCYTES NFR BLD AUTO: 5.9 %
MCH RBC QN AUTO: 30.8 PG (ref 26–34)
MCHC RBC AUTO-ENTMCNC: 32 G/DL (ref 32–36)
MCV RBC AUTO: 96 FL (ref 80–100)
MONOCYTES # BLD AUTO: 0.46 X10*3/UL (ref 0.05–0.8)
MONOCYTES NFR BLD AUTO: 8.4 %
NEUTROPHILS # BLD AUTO: 4.58 X10*3/UL (ref 1.6–5.5)
NEUTROPHILS NFR BLD AUTO: 83.6 %
NRBC BLD-RTO: 0 /100 WBCS (ref 0–0)
PLATELET # BLD AUTO: 172 X10*3/UL (ref 150–450)
POTASSIUM SERPL-SCNC: 4.1 MMOL/L (ref 3.5–5.3)
PROT SERPL-MCNC: 6.2 G/DL (ref 6.4–8.2)
PROTHROMBIN TIME: 17.8 SECONDS (ref 9.8–12.4)
RBC # BLD AUTO: 3.73 X10*6/UL (ref 4.5–5.9)
SODIUM SERPL-SCNC: 138 MMOL/L (ref 136–145)
WBC # BLD AUTO: 5.5 X10*3/UL (ref 4.4–11.3)

## 2025-02-25 PROCEDURE — 99285 EMERGENCY DEPT VISIT HI MDM: CPT | Mod: 25 | Performed by: STUDENT IN AN ORGANIZED HEALTH CARE EDUCATION/TRAINING PROGRAM

## 2025-02-25 PROCEDURE — 80053 COMPREHEN METABOLIC PANEL: CPT

## 2025-02-25 PROCEDURE — 90715 TDAP VACCINE 7 YRS/> IM: CPT

## 2025-02-25 PROCEDURE — 90471 IMMUNIZATION ADMIN: CPT

## 2025-02-25 PROCEDURE — 70450 CT HEAD/BRAIN W/O DYE: CPT

## 2025-02-25 PROCEDURE — 71045 X-RAY EXAM CHEST 1 VIEW: CPT

## 2025-02-25 PROCEDURE — 85610 PROTHROMBIN TIME: CPT

## 2025-02-25 PROCEDURE — G0390 TRAUMA RESPONS W/HOSP CRITI: HCPCS

## 2025-02-25 PROCEDURE — 85025 COMPLETE CBC W/AUTO DIFF WBC: CPT

## 2025-02-25 PROCEDURE — 72125 CT NECK SPINE W/O DYE: CPT | Performed by: RADIOLOGY

## 2025-02-25 PROCEDURE — 72125 CT NECK SPINE W/O DYE: CPT

## 2025-02-25 PROCEDURE — 36415 COLL VENOUS BLD VENIPUNCTURE: CPT

## 2025-02-25 PROCEDURE — 71045 X-RAY EXAM CHEST 1 VIEW: CPT | Performed by: RADIOLOGY

## 2025-02-25 PROCEDURE — 2500000004 HC RX 250 GENERAL PHARMACY W/ HCPCS (ALT 636 FOR OP/ED)

## 2025-02-25 PROCEDURE — 70450 CT HEAD/BRAIN W/O DYE: CPT | Performed by: STUDENT IN AN ORGANIZED HEALTH CARE EDUCATION/TRAINING PROGRAM

## 2025-02-25 RX ADMIN — TETANUS TOXOID, REDUCED DIPHTHERIA TOXOID AND ACELLULAR PERTUSSIS VACCINE, ADSORBED 0.5 ML: 5; 2.5; 8; 8; 2.5 SUSPENSION INTRAMUSCULAR at 13:29

## 2025-02-25 ASSESSMENT — COLUMBIA-SUICIDE SEVERITY RATING SCALE - C-SSRS
1. IN THE PAST MONTH, HAVE YOU WISHED YOU WERE DEAD OR WISHED YOU COULD GO TO SLEEP AND NOT WAKE UP?: NO
2. HAVE YOU ACTUALLY HAD ANY THOUGHTS OF KILLING YOURSELF?: NO
6. HAVE YOU EVER DONE ANYTHING, STARTED TO DO ANYTHING, OR PREPARED TO DO ANYTHING TO END YOUR LIFE?: NO

## 2025-02-25 ASSESSMENT — PAIN SCALES - GENERAL: PAINLEVEL_OUTOF10: 0 - NO PAIN

## 2025-02-25 ASSESSMENT — LIFESTYLE VARIABLES
HAVE YOU EVER FELT YOU SHOULD CUT DOWN ON YOUR DRINKING: NO
EVER FELT BAD OR GUILTY ABOUT YOUR DRINKING: NO
EVER HAD A DRINK FIRST THING IN THE MORNING TO STEADY YOUR NERVES TO GET RID OF A HANGOVER: NO
HAVE PEOPLE ANNOYED YOU BY CRITICIZING YOUR DRINKING: NO
TOTAL SCORE: 0

## 2025-02-25 ASSESSMENT — PAIN - FUNCTIONAL ASSESSMENT: PAIN_FUNCTIONAL_ASSESSMENT: 0-10

## 2025-02-25 NOTE — ED PROCEDURE NOTE
Procedure  Critical Care    Performed by: Lux Juarez MD  Authorized by: Lux Juarez MD    Critical care provider statement:     Critical care time (minutes):  15    Critical care time was exclusive of:  Separately billable procedures and treating other patients and teaching time    Critical care was necessary to treat or prevent imminent or life-threatening deterioration of the following conditions:  Trauma    Critical care was time spent personally by me on the following activities:  Development of treatment plan with patient or surrogate, evaluation of patient's response to treatment, examination of patient, interpretation of cardiac output measurements, obtaining history from patient or surrogate, ordering and performing treatments and interventions, ordering and review of laboratory studies, ordering and review of radiographic studies, pulse oximetry and re-evaluation of patient's condition             Lux Juarez MD  02/25/25 1140

## 2025-02-25 NOTE — ED PROVIDER NOTES
HPI   Chief Complaint   Patient presents with    Fall       HPI  Cheko Jin is a 79 year old male with a history of Advanced Dementia, A-fib on Eliquis, HFrEF, CAD, CKD, CVA presents after unwitnessed fall. History from patient was limited due to advanced dementia. Patient comes from [facility]. Staff reported that patient had an unwitnessed fall while in his room. Patient was found to have an abrasion at the top of his head. Per nurse at [place] patient couldn't have been down for more than 3-4 minutes. Patient does not recall hitting his head. He does not remember anything that happened prior to fall. Patient was just in the ED 2/23 for right posterior periprosthetic hip dislocation that was successfully reduced. Patient is on Eliquis. Denies vision changes, headaches, dizziness, SOB, palpitations, chest pain, abdominal pain, back pain, urinary/bowel changes.     Patient History   Past Medical History:   Diagnosis Date    Alzheimer's dementia (Multi)     Atrial flutter (Multi)     CAD (coronary artery disease)     nonobstructive    CKD (chronic kidney disease)     Dilated cardiomyopathy (Multi)     Gout     HFrEF (heart failure with reduced ejection fraction) (Multi)     Hypertension     Malignant neoplasm of bladder, unspecified (Multi)     NSVT (nonsustained ventricular tachycardia) (Multi)     Lifevest placed    Sleep apnea     Stroke (cerebrum) (Multi)     3 strokes R frontal, parietal lobes (July, 2010, September, 2010 and October, 2010), etiology embolic due to 40-50% ulcerated R ICA plaque s/p R CEA 10/12/10     Past Surgical History:   Procedure Laterality Date    HERNIA REPAIR Bilateral     TRANSURETHRAL RESECTION OF PROSTATE       Family History   Problem Relation Name Age of Onset    No Known Problems Mother      No Known Problems Father       Social History     Tobacco Use    Smoking status: Never     Passive exposure: Never    Smokeless tobacco: Never   Vaping Use    Vaping status: Never  Used   Substance Use Topics    Alcohol use: Not Currently    Drug use: Defer       Physical Exam   ED Triage Vitals [02/25/25 1209]   Temperature Heart Rate Respirations BP   36.5 °C (97.7 °F) 58 18 128/59      Pulse Ox Temp Source Heart Rate Source Patient Position   97 % Tympanic Monitor Lying      BP Location FiO2 (%)     Left arm --       Physical Exam:  General:  Pleasant and cooperative. No apparent distress.  HEENT:  Normocephalic, abrasion as seen in image below  Chest:  Clear to auscultation bilaterally. No wheezes, rales, or rhonchi.  CV:  Regular rate and rhythm. No murmurs    Abdomen: Abdomen is soft, non-tender, non-distended. BS +   Musculoskeletal:  No lower extremity edema or cyanosis. Able to move all 4 extremities without endorsement of pain. No endorsement of tenderness with palpation C/T/L spine.  Neurological:  AAOx3. No focal deficits.  Skin:  Warm and dry.      ED Course & MDM   Cheko Jin is a 79 year old male with a history of Advanced Dementia, A-fib on Eliquis, HFrEF, CAD, CKD, CVA presents after unwitnessed fall. EKG showed sinus bradycardia with HR 58, diffuse T wave flattening, no ST segment changes concerning for ischemia. CBC, CMP, Coag ordered. CT head and cervical spine ordered. CXR ordered. Tetanus booster ordered. CT head showed no acute intracranial hemorrhage, mass effect, acute infarct. CT cervical spine showed no acute fractures. CXR shows cardiomegaly. Labs showed Cr 2.47. Recommend follow up with PCP and repeating labs upon return to facility. Discussed work up results with patient. Patient is agreeable with plans for discharge.     No data recorded     Downing Coma Scale Score: 14 (02/25/25 1211 : Nevin Rodriguez RN)                   Procedure  Procedures     Shamir Lamb, DO  Resident  02/25/25 1414

## 2025-02-28 LAB
ATRIAL RATE: 58 BPM
P AXIS: 50 DEGREES
PR INTERVAL: 187 MS
Q ONSET: 252 MS
QRS COUNT: 9 BEATS
QRS DURATION: 101 MS
QT INTERVAL: 494 MS
QTC CALCULATION(BAZETT): 486 MS
QTC FREDERICIA: 488 MS
R AXIS: 27 DEGREES
T AXIS: 84 DEGREES
T OFFSET: 499 MS
VENTRICULAR RATE: 58 BPM

## 2025-02-28 PROCEDURE — 93005 ELECTROCARDIOGRAM TRACING: CPT

## 2025-03-13 ENCOUNTER — APPOINTMENT (OUTPATIENT)
Dept: RADIOLOGY | Facility: HOSPITAL | Age: 80
DRG: 698 | End: 2025-03-13
Payer: MEDICARE

## 2025-03-13 ENCOUNTER — APPOINTMENT (OUTPATIENT)
Dept: CARDIOLOGY | Facility: HOSPITAL | Age: 80
DRG: 698 | End: 2025-03-13
Payer: MEDICARE

## 2025-03-13 ENCOUNTER — HOSPITAL ENCOUNTER (EMERGENCY)
Facility: HOSPITAL | Age: 80
Discharge: HOME | DRG: 698 | End: 2025-03-13
Payer: MEDICARE

## 2025-03-13 VITALS
WEIGHT: 175 LBS | DIASTOLIC BLOOD PRESSURE: 82 MMHG | BODY MASS INDEX: 26.52 KG/M2 | HEART RATE: 80 BPM | RESPIRATION RATE: 19 BRPM | HEIGHT: 68 IN | SYSTOLIC BLOOD PRESSURE: 145 MMHG | OXYGEN SATURATION: 98 % | TEMPERATURE: 98.6 F

## 2025-03-13 DIAGNOSIS — S09.90XA CLOSED HEAD INJURY, INITIAL ENCOUNTER: Primary | ICD-10-CM

## 2025-03-13 DIAGNOSIS — S01.81XA FACIAL LACERATION, INITIAL ENCOUNTER: ICD-10-CM
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
## 2025-03-13 DIAGNOSIS — R31.9 HEMATURIA, UNSPECIFIED TYPE: ICD-10-CM

## 2025-03-13 LAB
ALBUMIN SERPL BCP-MCNC: 3.8 G/DL (ref 3.4–5)
ALP SERPL-CCNC: 59 U/L (ref 33–136)
ALT SERPL W P-5'-P-CCNC: 8 U/L (ref 10–52)
ANION GAP SERPL CALC-SCNC: 16 MMOL/L (ref 10–20)
APPEARANCE UR: ABNORMAL
AST SERPL W P-5'-P-CCNC: 13 U/L (ref 9–39)
BASOPHILS # BLD AUTO: 0.03 X10*3/UL (ref 0–0.1)
BASOPHILS NFR BLD AUTO: 0.2 %
BILIRUB SERPL-MCNC: 0.8 MG/DL (ref 0–1.2)
BILIRUB UR STRIP.AUTO-MCNC: NEGATIVE MG/DL
BUN SERPL-MCNC: 34 MG/DL (ref 6–23)
CALCIUM SERPL-MCNC: 8.9 MG/DL (ref 8.6–10.3)
CHLORIDE SERPL-SCNC: 107 MMOL/L (ref 98–107)
CO2 SERPL-SCNC: 21 MMOL/L (ref 21–32)
COLOR UR: ABNORMAL
CREAT SERPL-MCNC: 2.21 MG/DL (ref 0.5–1.3)
EGFRCR SERPLBLD CKD-EPI 2021: 30 ML/MIN/1.73M*2
EOSINOPHIL # BLD AUTO: 0 X10*3/UL (ref 0–0.4)
EOSINOPHIL NFR BLD AUTO: 0 %
ERYTHROCYTE [DISTWIDTH] IN BLOOD BY AUTOMATED COUNT: 13.8 % (ref 11.5–14.5)
GLUCOSE SERPL-MCNC: 193 MG/DL (ref 74–99)
GLUCOSE UR STRIP.AUTO-MCNC: NEGATIVE MG/DL
HCT VFR BLD AUTO: 36.1 % (ref 41–52)
HGB BLD-MCNC: 11.8 G/DL (ref 13.5–17.5)
IMM GRANULOCYTES # BLD AUTO: 0.07 X10*3/UL (ref 0–0.5)
IMM GRANULOCYTES NFR BLD AUTO: 0.5 % (ref 0–0.9)
KETONES UR STRIP.AUTO-MCNC: NEGATIVE MG/DL
LEUKOCYTE ESTERASE UR QL STRIP.AUTO: ABNORMAL
LYMPHOCYTES # BLD AUTO: 0.19 X10*3/UL (ref 0.8–3)
LYMPHOCYTES NFR BLD AUTO: 1.4 %
MCH RBC QN AUTO: 31 PG (ref 26–34)
MCHC RBC AUTO-ENTMCNC: 32.7 G/DL (ref 32–36)
MCV RBC AUTO: 95 FL (ref 80–100)
MONOCYTES # BLD AUTO: 0.77 X10*3/UL (ref 0.05–0.8)
MONOCYTES NFR BLD AUTO: 5.5 %
NEUTROPHILS # BLD AUTO: 13 X10*3/UL (ref 1.6–5.5)
NEUTROPHILS NFR BLD AUTO: 92.4 %
NITRITE UR QL STRIP.AUTO: NEGATIVE
NRBC BLD-RTO: 0 /100 WBCS (ref 0–0)
PH UR STRIP.AUTO: 6.5 [PH]
PLATELET # BLD AUTO: 175 X10*3/UL (ref 150–450)
POTASSIUM SERPL-SCNC: 3.9 MMOL/L (ref 3.5–5.3)
PROT SERPL-MCNC: 6.5 G/DL (ref 6.4–8.2)
PROT UR STRIP.AUTO-MCNC: ABNORMAL MG/DL
RBC # BLD AUTO: 3.81 X10*6/UL (ref 4.5–5.9)
RBC # UR STRIP.AUTO: ABNORMAL MG/DL
SODIUM SERPL-SCNC: 140 MMOL/L (ref 136–145)
SP GR UR STRIP.AUTO: >1.03
UROBILINOGEN UR STRIP.AUTO-MCNC: ABNORMAL MG/DL
WBC # BLD AUTO: 14.1 X10*3/UL (ref 4.4–11.3)

## 2025-03-13 PROCEDURE — 85025 COMPLETE CBC W/AUTO DIFF WBC: CPT | Performed by: PHYSICIAN ASSISTANT

## 2025-03-13 PROCEDURE — 72125 CT NECK SPINE W/O DYE: CPT | Performed by: RADIOLOGY

## 2025-03-13 PROCEDURE — 73522 X-RAY EXAM HIPS BI 3-4 VIEWS: CPT

## 2025-03-13 PROCEDURE — 87086 URINE CULTURE/COLONY COUNT: CPT | Mod: PARLAB | Performed by: PHYSICIAN ASSISTANT

## 2025-03-13 PROCEDURE — 74176 CT ABD & PELVIS W/O CONTRAST: CPT

## 2025-03-13 PROCEDURE — 96374 THER/PROPH/DIAG INJ IV PUSH: CPT | Mod: 59

## 2025-03-13 PROCEDURE — 12011 RPR F/E/E/N/L/M 2.5 CM/<: CPT

## 2025-03-13 PROCEDURE — 81003 URINALYSIS AUTO W/O SCOPE: CPT | Performed by: PHYSICIAN ASSISTANT

## 2025-03-13 PROCEDURE — 70450 CT HEAD/BRAIN W/O DYE: CPT | Performed by: RADIOLOGY

## 2025-03-13 PROCEDURE — 80053 COMPREHEN METABOLIC PANEL: CPT | Performed by: PHYSICIAN ASSISTANT

## 2025-03-13 PROCEDURE — 36415 COLL VENOUS BLD VENIPUNCTURE: CPT | Performed by: PHYSICIAN ASSISTANT

## 2025-03-13 PROCEDURE — 12052 INTMD RPR FACE/MM 2.6-5.0 CM: CPT | Performed by: PHYSICIAN ASSISTANT

## 2025-03-13 PROCEDURE — 71045 X-RAY EXAM CHEST 1 VIEW: CPT | Performed by: RADIOLOGY

## 2025-03-13 PROCEDURE — 99285 EMERGENCY DEPT VISIT HI MDM: CPT | Mod: 25

## 2025-03-13 PROCEDURE — 74176 CT ABD & PELVIS W/O CONTRAST: CPT | Performed by: RADIOLOGY

## 2025-03-13 PROCEDURE — 70450 CT HEAD/BRAIN W/O DYE: CPT

## 2025-03-13 PROCEDURE — 72125 CT NECK SPINE W/O DYE: CPT

## 2025-03-13 PROCEDURE — 71250 CT THORAX DX C-: CPT | Performed by: RADIOLOGY

## 2025-03-13 PROCEDURE — 71045 X-RAY EXAM CHEST 1 VIEW: CPT

## 2025-03-13 PROCEDURE — 93005 ELECTROCARDIOGRAM TRACING: CPT

## 2025-03-13 PROCEDURE — 99291 CRITICAL CARE FIRST HOUR: CPT | Performed by: PHYSICIAN ASSISTANT

## 2025-03-13 PROCEDURE — G0390 TRAUMA RESPONS W/HOSP CRITI: HCPCS

## 2025-03-13 PROCEDURE — 2500000004 HC RX 250 GENERAL PHARMACY W/ HCPCS (ALT 636 FOR OP/ED): Performed by: PHYSICIAN ASSISTANT

## 2025-03-13 PROCEDURE — 51702 INSERT TEMP BLADDER CATH: CPT | Mod: 59

## 2025-03-13 RX ORDER — FENTANYL CITRATE 50 UG/ML
50 INJECTION, SOLUTION INTRAMUSCULAR; INTRAVENOUS ONCE
Status: COMPLETED | OUTPATIENT
Start: 2025-03-13 | End: 2025-03-13

## 2025-03-13 RX ADMIN — FENTANYL CITRATE 50 MCG: 50 INJECTION INTRAMUSCULAR; INTRAVENOUS at 20:48

## 2025-03-13 ASSESSMENT — PAIN - FUNCTIONAL ASSESSMENT
PAIN_FUNCTIONAL_ASSESSMENT: 0-10

## 2025-03-13 ASSESSMENT — PAIN DESCRIPTION - PAIN TYPE: TYPE: ACUTE PAIN

## 2025-03-13 ASSESSMENT — PAIN DESCRIPTION - LOCATION: LOCATION: PENIS

## 2025-03-13 ASSESSMENT — PAIN SCALES - GENERAL
PAINLEVEL_OUTOF10: 0 - NO PAIN
PAINLEVEL_OUTOF10: 4

## 2025-03-13 NOTE — ED TRIAGE NOTES
Pt had a fall that he does not remember where he sustained a crossed laceration to the forehead. Pt is from memory care unit and is on eliquis 5mg

## 2025-03-13 NOTE — ED PROVIDER NOTES
HPI   No chief complaint on file.      79-year-old male with a significant past medical history of atrial fibrillation currently anticoagulated on Eliquis, hypertension, dementia, hyperlipidemia, sleep apnea, gout, and CKD who presents via EMS from [facility] for complaints of a head injury secondary to a fall.  The patient reportedly was in a wheelchair when he fell forward striking his head against the ground.  It is unknown if this was a witnessed fall.  EMS reported that the patient is stated to be at his baseline mental status.  He is a DNR [facility] arrest.  They could not appreciate any other injury on exam.  Patient who is alert and oriented to self only states no discomfort at this time.  He could not provide any meaningful history.  EMS did report that the patient had a laceration to his forehead.              Patient History   Past Medical History:   Diagnosis Date    Alzheimer's dementia (Multi)     Atrial flutter (Multi)     CAD (coronary artery disease)     nonobstructive    CKD (chronic kidney disease)     Dilated cardiomyopathy (Multi)     Gout     HFrEF (heart failure with reduced ejection fraction) (Multi)     Hypertension     Malignant neoplasm of bladder, unspecified (Multi)     NSVT (nonsustained ventricular tachycardia) (Multi)     Lifevest placed    Sleep apnea     Stroke (cerebrum) (Multi)     3 strokes R frontal, parietal lobes (July, 2010, September, 2010 and October, 2010), etiology embolic due to 40-50% ulcerated R ICA plaque s/p R CEA 10/12/10     Past Surgical History:   Procedure Laterality Date    HERNIA REPAIR Bilateral     TRANSURETHRAL RESECTION OF PROSTATE       Family History   Problem Relation Name Age of Onset    No Known Problems Mother      No Known Problems Father       Social History     Tobacco Use    Smoking status: Never     Passive exposure: Never    Smokeless tobacco: Never   Vaping Use    Vaping status: Never Used    Substance Use Topics    Alcohol use: Not Currently    Drug use: Defer       Physical Exam   ED Triage Vitals   Temp Pulse Resp BP   -- -- -- --      SpO2 Temp src Heart Rate Source Patient Position   -- -- -- --      BP Location FiO2 (%)     -- --       Physical Exam  Vitals and nursing note reviewed. Exam conducted with a chaperone present.   Constitutional:       General: He is not in acute distress.     Appearance: Normal appearance. He is normal weight. He is not ill-appearing, toxic-appearing or diaphoretic.   HENT:      Head: Normocephalic.      Comments: No evidence of basilar skull fracture     Nose: Nose normal.      Comments: No epistaxis     Mouth/Throat:      Mouth: Mucous membranes are dry.   Eyes:      Extraocular Movements: Extraocular movements intact.      Conjunctiva/sclera: Conjunctivae normal.   Neck:      Comments: No obvious midline cervical spine tenderness  Cardiovascular:      Rate and Rhythm: Normal rate and regular rhythm.      Pulses: Normal pulses.   Pulmonary:      Effort: Pulmonary effort is normal. No respiratory distress.      Breath sounds: Normal breath sounds.   Abdominal:      General: Abdomen is flat. There is no distension.      Palpations: Abdomen is soft.      Tenderness: There is no abdominal tenderness. There is no guarding or rebound.   Musculoskeletal:         General: Normal range of motion.      Cervical back: Normal range of motion and neck supple.      Comments: No obvious direct bony tenderness appreciated on exam of the extremities.  Patient has full range of motion   Skin:     General: Skin is warm and dry.      Capillary Refill: Capillary refill takes less than 2 seconds.      Comments: Stellate laceration to the forehead just above the medial left eyebrow measuring 3.5 cm in total length   Neurological:      Mental Status: He is alert. Mental status is at baseline.   Psychiatric:         Mood and Affect: Mood normal.         Behavior: Behavior normal.          Thought Content: Thought content normal.         Judgment: Judgment normal.           ED Course & MDM   ED Course as of 03/13/25 2207   Thu Mar 13, 2025   1944 Serum creatinine improved from most recent at 2.21 [DS]   1944 Hemoglobin improved most recent at 11.8. [DS]   1944 Platelets: 175 [DS]   1945 IMPRESSION:  Stable marked brain atrophy and associated findings suggestive of  chronic small vessel ischemic disease. No evidence of acute cortical  infarct or intracranial hemorrhage. Please note that study is limited  to patient's motion artifacts.   [DS]   1946 IMPRESSION:  1. Advanced multilevel degenerative changes similar to previous exam,  stable. No evidence for an acute fracture or subluxation of the  cervical spine. No significant interval changes since prior with  additional findings as described.   [DS]   1947 IMPRESSION:  CHEST  1.  Healing fractures of the left lower ribs as above including  posterior aspect of the left 7th, 9th and 10th ribs. No pneumothorax.  No CT evidence for acute intrathoracic injury is suspected.  2. Heavy diffuse coronary artery disease.  3. Additional detailed findings as above.      ABDOMEN - PELVIS  1.  Irregular hyperdense moderate wall thickening of the urinary  bladder, nonspecific in etiology may represent inflammatory,  infectious or malignant etiology. Hemorrhagic cystitis should be  considered.  2. Moderate bilateral hydronephrosis and hydroureter with perinephric  stranding but no obstructing calculus seen bilaterally. Findings  could be related to bladder pathology. Clinical correlation and with  urinalysis recommended. Urology consult also recommended.  3. Colonic diverticulosis but no CT evidence for acute diverticulitis.  4. Diffuse degenerative changes throughout the thoracic and lumbar  spine. No destructive bone lesions. No acute fractures.  5. Cholelithiasis but no secondary signs of acute cholecystitis.  6. Additional detailed findings as above.           [DS]    2206 EKG was obtained and interpreted by myself revealing normal sinus rhythm at a rate of 89 with no evidence of acute STEMI.  QRS duration 100.  No ectopy.  QTc 396. [DS]      ED Course User Index  [DS] Lewis Constantino PA-C                 No data recorded                                 Medical Decision Making    Medical Decision Making & ED Course  Medical Decision Making:  Trauma activation was called given the head injury on anticoagulation therapy.  Patient is reported to be at his baseline mental status which is alert and oriented to self.  Patient has a documented history of dementia Alzheimer's.  He is currently on Eliquis for atrial fibrillation.  Patient reportedly was in a wheelchair when he fell out of the wheelchair striking his head against the ground.  It is not known if the patient lost consciousness however the impact caused a laceration to his forehead.  Patient was taken directly to CT for imaging of his head and cervical spine.  Imaging was found to be negative.  Upon further evaluation of the patient he was found to have a Iqbal catheter with ricky hematuria.  Given this blood work was obtained along with additional imaging studies.  Patient was found to have hemoglobin of 11.8 with a serum creatinine improved most recent at 2.21.  Platelet count 175.  CT imaging revealed remote findings along with no evidence of substance in the bladder most likely clots.  From a trauma standpoint the patient was cleared and his status was downgraded.  Patient's Iqbal catheter was changed.  A three-way catheter was placed along with continuous irrigation.  Patient urinalysis was reassessed and found to be a pink tinge.  Urinalysis revealed no evidence of overt infection.  Patient's laceration was cleaned and irrigated.  Sutures were placed approximating the wound.  His tetanus status appears to be up-to-date.  At this point we the patient is appropriate for discharge with close follow-up.  He will be  discharged back to the care facility.  Report will be provided along with additional recommendations of close observation given the head injury.  --  Scoring Tools Utilized: [place] CT    Differential diagnoses considered include but are not limited to: Skull fracture, concussion, intracranial hemorrhage     Social Determinants of Health which Significantly Impact Care: None identified The following actions were taken to address these social determinants: None    EKG Independent Interpretation: EKG interpreted by myself. Please see ED Course for full interpretation.    Independent Result Review and Interpretation: Relevant laboratory and radiographic results were reviewed and independently interpreted by myself.  As necessary, they are commented on in the ED Course.    Chronic conditions affecting the patient's care: None    The patient was discussed with the following consultants/services: None    Care Considerations: None    ED Course:     Disposition  As a result of the work-up, the patient was discharged home.  he was informed of his diagnosis and instructed to come back with any concerns or worsening of condition.  he and was agreeable to the plan as discussed above.  he was given the opportunity to ask questions.  All of the patient's questions were answered.      Patient was seen independently    Lewis Constantino PA-C  Emergency Medicine            Procedure  Critical Care    Performed by: Lewis Constantino PA-C  Authorized by: Lewis Constantino PA-C    Critical care provider statement:     Critical care time (minutes):  60    Critical care start time:  3/13/2025 6:15 PM    Critical care end time:  3/13/2025 7:15 PM    Critical care time was exclusive of:  Separately billable procedures and treating other patients    Critical care was necessary to treat or prevent imminent or life-threatening deterioration of the following conditions:  Trauma    Critical care was time spent personally by me on the following  activities:  Examination of patient, blood draw for specimens, pulse oximetry, re-evaluation of patient's condition, review of old charts, ordering and review of radiographic studies, ordering and review of laboratory studies, ordering and performing treatments and interventions and evaluation of patient's response to treatment       Lewis Constantino PA-C  03/13/25 8514

## 2025-03-14 ENCOUNTER — APPOINTMENT (OUTPATIENT)
Dept: RADIOLOGY | Facility: HOSPITAL | Age: 80
DRG: 698 | End: 2025-03-14
Payer: MEDICARE

## 2025-03-14 ENCOUNTER — HOSPITAL ENCOUNTER (EMERGENCY)
Facility: HOSPITAL | Age: 80
Discharge: HOME | DRG: 698 | End: 2025-03-14
Attending: STUDENT IN AN ORGANIZED HEALTH CARE EDUCATION/TRAINING PROGRAM
Payer: MEDICARE

## 2025-03-14 VITALS
RESPIRATION RATE: 17 BRPM | HEIGHT: 70 IN | TEMPERATURE: 99.1 F | SYSTOLIC BLOOD PRESSURE: 153 MMHG | OXYGEN SATURATION: 95 % | DIASTOLIC BLOOD PRESSURE: 77 MMHG | BODY MASS INDEX: 25.77 KG/M2 | WEIGHT: 180 LBS | HEART RATE: 77 BPM

## 2025-03-14 DIAGNOSIS — S09.90XA HEAD INJURY, INITIAL ENCOUNTER: Primary | ICD-10-CM

## 2025-03-14 LAB
ATRIAL RATE: 89 BPM
HOLD SPECIMEN: NORMAL
P AXIS: 40 DEGREES
P OFFSET: 176 MS
P ONSET: 118 MS
PR INTERVAL: 184 MS
Q ONSET: 210 MS
QRS COUNT: 15 BEATS
QRS DURATION: 100 MS
QT INTERVAL: 326 MS
QTC CALCULATION(BAZETT): 396 MS
QTC FREDERICIA: 371 MS
R AXIS: 13 DEGREES
T AXIS: 73 DEGREES
T OFFSET: 373 MS
VENTRICULAR RATE: 89 BPM

## 2025-03-14 PROCEDURE — 72125 CT NECK SPINE W/O DYE: CPT | Performed by: RADIOLOGY

## 2025-03-14 PROCEDURE — 70450 CT HEAD/BRAIN W/O DYE: CPT

## 2025-03-14 PROCEDURE — G0390 TRAUMA RESPONS W/HOSP CRITI: HCPCS

## 2025-03-14 PROCEDURE — 99284 EMERGENCY DEPT VISIT MOD MDM: CPT | Mod: 25 | Performed by: STUDENT IN AN ORGANIZED HEALTH CARE EDUCATION/TRAINING PROGRAM

## 2025-03-14 PROCEDURE — 70450 CT HEAD/BRAIN W/O DYE: CPT | Performed by: RADIOLOGY

## 2025-03-14 PROCEDURE — 72125 CT NECK SPINE W/O DYE: CPT

## 2025-03-14 ASSESSMENT — COLUMBIA-SUICIDE SEVERITY RATING SCALE - C-SSRS
1. IN THE PAST MONTH, HAVE YOU WISHED YOU WERE DEAD OR WISHED YOU COULD GO TO SLEEP AND NOT WAKE UP?: NO
1. IN THE PAST MONTH, HAVE YOU WISHED YOU WERE DEAD OR WISHED YOU COULD GO TO SLEEP AND NOT WAKE UP?: NO
6. HAVE YOU EVER DONE ANYTHING, STARTED TO DO ANYTHING, OR PREPARED TO DO ANYTHING TO END YOUR LIFE?: NO
2. HAVE YOU ACTUALLY HAD ANY THOUGHTS OF KILLING YOURSELF?: NO
6. HAVE YOU EVER DONE ANYTHING, STARTED TO DO ANYTHING, OR PREPARED TO DO ANYTHING TO END YOUR LIFE?: NO
2. HAVE YOU ACTUALLY HAD ANY THOUGHTS OF KILLING YOURSELF?: NO

## 2025-03-14 ASSESSMENT — PAIN - FUNCTIONAL ASSESSMENT: PAIN_FUNCTIONAL_ASSESSMENT: 0-10

## 2025-03-14 ASSESSMENT — PAIN SCALES - GENERAL: PAINLEVEL_OUTOF10: 0 - NO PAIN

## 2025-03-14 NOTE — ED TRIAGE NOTES
PT. BIBA FROM [facility] S/P UNWITNESSED FALL. PER EMS, PT. FELL TWICE TODAY. PT. SEEN IN ED LAST NIGHT FOR FALL ALSO. PT. ON ELIQUIS. PT. ARRIVED WITH 3-WAY LEDEZMA IN PLACE WITH NO PLUG TO ONE DRAINAGE TUBE. BLOODY URINE NOTED. PER EMS, STAFF STATES PT. WITH HEMATURIA OFF AND ON. PT. STATES DOES NOT REMEMBER FALLING, DENIES PAIN.

## 2025-03-14 NOTE — ED PROCEDURE NOTE
Procedure  Laceration Repair    Performed by: Lewis Constantino PA-C  Authorized by: Lewis Constantino PA-C    Consent:     Consent obtained:  Verbal    Consent given by:  Patient    Risks, benefits, and alternatives were discussed: yes      Risks discussed:  Infection, pain, retained foreign body, tendon damage, poor cosmetic result, need for additional repair, nerve damage, poor wound healing and vascular damage    Alternatives discussed:  No treatment  Universal protocol:     Patient identity confirmed:  Verbally with patient and arm band  Anesthesia:     Anesthesia method:  Local infiltration    Local anesthetic:  Lidocaine 1% WITH epi  Laceration details:     Location:  Face    Face location:  L eyebrow    Length (cm):  3.5  Pre-procedure details:     Preparation:  Patient was prepped and draped in usual sterile fashion  Exploration:     Hemostasis achieved with:  Direct pressure    Imaging outcome: foreign body not noted      Wound extent: no muscle damage noted and no underlying fracture noted      Contaminated: no    Treatment:     Area cleansed with:  Chlorhexidine and saline    Amount of cleaning:  Standard    Irrigation solution:  Sterile saline    Irrigation method:  Syringe    Visualized foreign bodies/material removed: no      Debridement:  None    Undermining:  None  Skin repair:     Repair method:  Sutures    Suture size:  5-0    Suture material:  Prolene    Suture technique:  Simple interrupted    Number of sutures:  6  Approximation:     Approximation:  Close  Repair type:     Repair type:  Intermediate  Post-procedure details:     Dressing:  Antibiotic ointment and adhesive bandage    Procedure completion:  Tolerated               Lewis Constantino PA-C  03/13/25 8082

## 2025-03-15 ENCOUNTER — APPOINTMENT (OUTPATIENT)
Dept: RADIOLOGY | Facility: HOSPITAL | Age: 80
DRG: 698 | End: 2025-03-15
Payer: MEDICARE

## 2025-03-15 ENCOUNTER — APPOINTMENT (OUTPATIENT)
Dept: CARDIOLOGY | Facility: HOSPITAL | Age: 80
DRG: 698 | End: 2025-03-15
Payer: MEDICARE

## 2025-03-15 ENCOUNTER — HOSPITAL ENCOUNTER (INPATIENT)
Facility: HOSPITAL | Age: 80
DRG: 698 | End: 2025-03-15
Attending: EMERGENCY MEDICINE | Admitting: INTERNAL MEDICINE
Payer: MEDICARE

## 2025-03-15 DIAGNOSIS — A41.9 SEPTIC SHOCK (MULTI): ICD-10-CM

## 2025-03-15 DIAGNOSIS — R31.0 GROSS HEMATURIA: ICD-10-CM

## 2025-03-15 DIAGNOSIS — R53.1 GENERALIZED WEAKNESS: ICD-10-CM

## 2025-03-15 DIAGNOSIS — R79.89 ELEVATED TROPONIN: ICD-10-CM

## 2025-03-15 DIAGNOSIS — I95.9 HYPOTENSION, UNSPECIFIED HYPOTENSION TYPE: ICD-10-CM

## 2025-03-15 DIAGNOSIS — J96.01 ACUTE RESPIRATORY FAILURE WITH HYPOXIA: ICD-10-CM

## 2025-03-15 DIAGNOSIS — R65.21 SEPTIC SHOCK (MULTI): ICD-10-CM

## 2025-03-15 DIAGNOSIS — N39.0 URINARY TRACT INFECTION, BACTERIAL: ICD-10-CM

## 2025-03-15 DIAGNOSIS — A49.9 URINARY TRACT INFECTION, BACTERIAL: ICD-10-CM

## 2025-03-15 DIAGNOSIS — N17.9 AKI (ACUTE KIDNEY INJURY): Primary | ICD-10-CM

## 2025-03-15 DIAGNOSIS — R41.82 ALTERED MENTAL STATUS, UNSPECIFIED ALTERED MENTAL STATUS TYPE: ICD-10-CM

## 2025-03-15 DIAGNOSIS — R57.8 HEMORRHAGIC SHOCK (MULTI): ICD-10-CM

## 2025-03-15 LAB
ABO GROUP (TYPE) IN BLOOD: NORMAL
ACANTHOCYTES BLD QL SMEAR: ABNORMAL
ALBUMIN SERPL BCP-MCNC: 3.5 G/DL (ref 3.4–5)
ALP SERPL-CCNC: 54 U/L (ref 33–136)
ALT SERPL W P-5'-P-CCNC: 10 U/L (ref 10–52)
ANION GAP BLDV CALCULATED.4IONS-SCNC: 14 MMOL/L (ref 10–25)
ANION GAP SERPL CALC-SCNC: 20 MMOL/L (ref 10–20)
ANTIBODY SCREEN: NORMAL
APPEARANCE UR: ABNORMAL
APTT PPP: 28 SECONDS (ref 26–36)
AST SERPL W P-5'-P-CCNC: 19 U/L (ref 9–39)
BACTERIA #/AREA URNS AUTO: ABNORMAL /HPF
BASE EXCESS BLDV CALC-SCNC: -2.7 MMOL/L (ref -2–3)
BASOPHILS # BLD MANUAL: 0 X10*3/UL (ref 0–0.1)
BASOPHILS # BLD MANUAL: 0 X10*3/UL (ref 0–0.1)
BASOPHILS NFR BLD MANUAL: 0 %
BASOPHILS NFR BLD MANUAL: 0 %
BILIRUB SERPL-MCNC: 1.3 MG/DL (ref 0–1.2)
BILIRUB UR STRIP.AUTO-MCNC: NEGATIVE MG/DL
BLOOD EXPIRATION DATE: NORMAL
BLOOD EXPIRATION DATE: NORMAL
BNP SERPL-MCNC: 681 PG/ML (ref 0–99)
BODY TEMPERATURE: 37 DEGREES CELSIUS
BUN SERPL-MCNC: 50 MG/DL (ref 6–23)
CA-I BLDV-SCNC: 1.2 MMOL/L (ref 1.1–1.33)
CALCIUM SERPL-MCNC: 8.9 MG/DL (ref 8.6–10.3)
CARDIAC TROPONIN I PNL SERPL HS: 57 NG/L (ref 0–20)
CARDIAC TROPONIN I PNL SERPL HS: 71 NG/L (ref 0–20)
CHLORIDE BLDV-SCNC: 104 MMOL/L (ref 98–107)
CHLORIDE SERPL-SCNC: 103 MMOL/L (ref 98–107)
CO2 SERPL-SCNC: 17 MMOL/L (ref 21–32)
COLOR UR: ABNORMAL
CREAT SERPL-MCNC: 3.52 MG/DL (ref 0.5–1.3)
DACRYOCYTES BLD QL SMEAR: ABNORMAL
DACRYOCYTES BLD QL SMEAR: ABNORMAL
DISPENSE STATUS: NORMAL
DISPENSE STATUS: NORMAL
EGFRCR SERPLBLD CKD-EPI 2021: 17 ML/MIN/1.73M*2
EOSINOPHIL # BLD MANUAL: 0 X10*3/UL (ref 0–0.4)
EOSINOPHIL # BLD MANUAL: 0 X10*3/UL (ref 0–0.4)
EOSINOPHIL NFR BLD MANUAL: 0 %
EOSINOPHIL NFR BLD MANUAL: 0 %
ERYTHROCYTE [DISTWIDTH] IN BLOOD BY AUTOMATED COUNT: 14.2 % (ref 11.5–14.5)
ERYTHROCYTE [DISTWIDTH] IN BLOOD BY AUTOMATED COUNT: 14.3 % (ref 11.5–14.5)
ERYTHROCYTE [DISTWIDTH] IN BLOOD BY AUTOMATED COUNT: 14.4 % (ref 11.5–14.5)
FLUAV RNA RESP QL NAA+PROBE: NOT DETECTED
FLUBV RNA RESP QL NAA+PROBE: NOT DETECTED
GIANT PLATELETS BLD QL SMEAR: ABNORMAL
GLUCOSE BLD MANUAL STRIP-MCNC: 114 MG/DL (ref 74–99)
GLUCOSE BLDV-MCNC: 102 MG/DL (ref 74–99)
GLUCOSE SERPL-MCNC: 116 MG/DL (ref 74–99)
GLUCOSE UR STRIP.AUTO-MCNC: NEGATIVE MG/DL
HCO3 BLDV-SCNC: 19.9 MMOL/L (ref 22–26)
HCT VFR BLD AUTO: 29.1 % (ref 41–52)
HCT VFR BLD AUTO: 34.1 % (ref 41–52)
HCT VFR BLD AUTO: 35 % (ref 41–52)
HCT VFR BLD EST: 39 % (ref 41–52)
HGB BLD-MCNC: 10.8 G/DL (ref 13.5–17.5)
HGB BLD-MCNC: 11.4 G/DL (ref 13.5–17.5)
HGB BLD-MCNC: 9.7 G/DL (ref 13.5–17.5)
HGB BLDV-MCNC: 13 G/DL (ref 13.5–17.5)
HOLD SPECIMEN: NORMAL
IMM GRANULOCYTES # BLD AUTO: 0 X10*3/UL (ref 0–0.5)
IMM GRANULOCYTES # BLD AUTO: 0.01 X10*3/UL (ref 0–0.5)
IMM GRANULOCYTES NFR BLD AUTO: 0 % (ref 0–0.9)
IMM GRANULOCYTES NFR BLD AUTO: 0.3 % (ref 0–0.9)
INHALED O2 CONCENTRATION: 28 %
INR PPP: 1.6 (ref 0.9–1.1)
KETONES UR STRIP.AUTO-MCNC: NEGATIVE MG/DL
LACTATE BLDV-SCNC: 3.8 MMOL/L (ref 0.4–2)
LACTATE SERPL-SCNC: 1.8 MMOL/L (ref 0.4–2)
LACTATE SERPL-SCNC: 2.4 MMOL/L (ref 0.4–2)
LACTATE SERPL-SCNC: 2.4 MMOL/L (ref 0.4–2)
LACTATE SERPL-SCNC: 3.3 MMOL/L (ref 0.4–2)
LEUKOCYTE ESTERASE UR QL STRIP.AUTO: ABNORMAL
LIPASE SERPL-CCNC: 5 U/L (ref 9–82)
LYMPHOCYTES # BLD MANUAL: 0.07 X10*3/UL (ref 0.8–3)
LYMPHOCYTES # BLD MANUAL: 0.12 X10*3/UL (ref 0.8–3)
LYMPHOCYTES NFR BLD MANUAL: 17 %
LYMPHOCYTES NFR BLD MANUAL: 2 %
MCH RBC QN AUTO: 30.7 PG (ref 26–34)
MCH RBC QN AUTO: 30.7 PG (ref 26–34)
MCH RBC QN AUTO: 31.1 PG (ref 26–34)
MCHC RBC AUTO-ENTMCNC: 31.7 G/DL (ref 32–36)
MCHC RBC AUTO-ENTMCNC: 32.6 G/DL (ref 32–36)
MCHC RBC AUTO-ENTMCNC: 33.3 G/DL (ref 32–36)
MCV RBC AUTO: 93 FL (ref 80–100)
MCV RBC AUTO: 94 FL (ref 80–100)
MCV RBC AUTO: 97 FL (ref 80–100)
METAMYELOCYTES # BLD MANUAL: 0.01 X10*3/UL
METAMYELOCYTES # BLD MANUAL: 0.11 X10*3/UL
METAMYELOCYTES NFR BLD MANUAL: 1 %
METAMYELOCYTES NFR BLD MANUAL: 3 %
MONOCYTES # BLD MANUAL: 0.01 X10*3/UL (ref 0.05–0.8)
MONOCYTES # BLD MANUAL: 0.14 X10*3/UL (ref 0.05–0.8)
MONOCYTES NFR BLD MANUAL: 1 %
MONOCYTES NFR BLD MANUAL: 4 %
MRSA DNA SPEC QL NAA+PROBE: NOT DETECTED
NEUTROPHILS # BLD MANUAL: 0.57 X10*3/UL (ref 1.6–5.5)
NEUTROPHILS # BLD MANUAL: 3.27 X10*3/UL (ref 1.6–5.5)
NEUTS BAND # BLD MANUAL: 0.19 X10*3/UL (ref 0–0.5)
NEUTS BAND # BLD MANUAL: 1.69 X10*3/UL (ref 0–0.5)
NEUTS BAND NFR BLD MANUAL: 27 %
NEUTS BAND NFR BLD MANUAL: 47 %
NEUTS SEG # BLD MANUAL: 0.38 X10*3/UL (ref 1.6–5)
NEUTS SEG # BLD MANUAL: 1.58 X10*3/UL (ref 1.6–5)
NEUTS SEG NFR BLD MANUAL: 44 %
NEUTS SEG NFR BLD MANUAL: 54 %
NEUTS VAC BLD QL SMEAR: PRESENT
NITRITE UR QL STRIP.AUTO: NEGATIVE
NRBC BLD-RTO: 0 /100 WBCS (ref 0–0)
OVALOCYTES BLD QL SMEAR: ABNORMAL
OVALOCYTES BLD QL SMEAR: ABNORMAL
OXYHGB MFR BLDV: 85.8 % (ref 45–75)
PCO2 BLDV: 28 MM HG (ref 41–51)
PH BLDV: 7.46 PH (ref 7.33–7.43)
PH UR STRIP.AUTO: 6 [PH]
PLATELET # BLD AUTO: 106 X10*3/UL (ref 150–450)
PLATELET # BLD AUTO: 108 X10*3/UL (ref 150–450)
PLATELET # BLD AUTO: 113 X10*3/UL (ref 150–450)
PLATELET CLUMP BLD QL SMEAR: PRESENT
PO2 BLDV: 55 MM HG (ref 35–45)
POLYCHROMASIA BLD QL SMEAR: ABNORMAL
POLYCHROMASIA BLD QL SMEAR: ABNORMAL
POTASSIUM BLDV-SCNC: 4 MMOL/L (ref 3.5–5.3)
POTASSIUM SERPL-SCNC: 3.8 MMOL/L (ref 3.5–5.3)
PRODUCT BLOOD TYPE: 5100
PRODUCT BLOOD TYPE: 5100
PRODUCT CODE: NORMAL
PRODUCT CODE: NORMAL
PROT SERPL-MCNC: 6.3 G/DL (ref 6.4–8.2)
PROT UR STRIP.AUTO-MCNC: ABNORMAL MG/DL
PROTHROMBIN TIME: 17.3 SECONDS (ref 9.8–12.4)
RBC # BLD AUTO: 3.12 X10*6/UL (ref 4.5–5.9)
RBC # BLD AUTO: 3.52 X10*6/UL (ref 4.5–5.9)
RBC # BLD AUTO: 3.71 X10*6/UL (ref 4.5–5.9)
RBC # UR STRIP.AUTO: ABNORMAL MG/DL
RBC #/AREA URNS AUTO: >20 /HPF
RBC MORPH BLD: ABNORMAL
RBC MORPH BLD: ABNORMAL
RH FACTOR (ANTIGEN D): NORMAL
SAO2 % BLDV: 88 % (ref 45–75)
SARS-COV-2 RNA RESP QL NAA+PROBE: NOT DETECTED
SCHISTOCYTES BLD QL SMEAR: ABNORMAL
SODIUM BLDV-SCNC: 134 MMOL/L (ref 136–145)
SODIUM SERPL-SCNC: 136 MMOL/L (ref 136–145)
SP GR UR STRIP.AUTO: 1.02
TOTAL CELLS COUNTED BLD: 100
TOTAL CELLS COUNTED BLD: 100
UNIT ABO: NORMAL
UNIT ABO: NORMAL
UNIT NUMBER: NORMAL
UNIT NUMBER: NORMAL
UNIT RH: NORMAL
UNIT RH: NORMAL
UNIT VOLUME: 216
UNIT VOLUME: 284
UROBILINOGEN UR STRIP.AUTO-MCNC: ABNORMAL MG/DL
WBC # BLD AUTO: 0.7 X10*3/UL (ref 4.4–11.3)
WBC # BLD AUTO: 14.8 X10*3/UL (ref 4.4–11.3)
WBC # BLD AUTO: 3.6 X10*3/UL (ref 4.4–11.3)
WBC #/AREA URNS AUTO: >50 /HPF
WBC CLUMPS #/AREA URNS AUTO: ABNORMAL /HPF
XM INTEP: NORMAL

## 2025-03-15 PROCEDURE — 83605 ASSAY OF LACTIC ACID: CPT

## 2025-03-15 PROCEDURE — 86923 COMPATIBILITY TEST ELECTRIC: CPT

## 2025-03-15 PROCEDURE — 96365 THER/PROPH/DIAG IV INF INIT: CPT

## 2025-03-15 PROCEDURE — 2500000004 HC RX 250 GENERAL PHARMACY W/ HCPCS (ALT 636 FOR OP/ED)

## 2025-03-15 PROCEDURE — 93005 ELECTROCARDIOGRAM TRACING: CPT

## 2025-03-15 PROCEDURE — 80053 COMPREHEN METABOLIC PANEL: CPT | Performed by: NURSE PRACTITIONER

## 2025-03-15 PROCEDURE — 85730 THROMBOPLASTIN TIME PARTIAL: CPT | Performed by: NURSE PRACTITIONER

## 2025-03-15 PROCEDURE — 82947 ASSAY GLUCOSE BLOOD QUANT: CPT

## 2025-03-15 PROCEDURE — 83880 ASSAY OF NATRIURETIC PEPTIDE: CPT | Performed by: NURSE PRACTITIONER

## 2025-03-15 PROCEDURE — 85610 PROTHROMBIN TIME: CPT | Performed by: NURSE PRACTITIONER

## 2025-03-15 PROCEDURE — 2500000002 HC RX 250 W HCPCS SELF ADMINISTERED DRUGS (ALT 637 FOR MEDICARE OP, ALT 636 FOR OP/ED)

## 2025-03-15 PROCEDURE — 85027 COMPLETE CBC AUTOMATED: CPT

## 2025-03-15 PROCEDURE — 71045 X-RAY EXAM CHEST 1 VIEW: CPT

## 2025-03-15 PROCEDURE — 86901 BLOOD TYPING SEROLOGIC RH(D): CPT | Performed by: NURSE PRACTITIONER

## 2025-03-15 PROCEDURE — 81003 URINALYSIS AUTO W/O SCOPE: CPT | Performed by: NURSE PRACTITIONER

## 2025-03-15 PROCEDURE — 70450 CT HEAD/BRAIN W/O DYE: CPT

## 2025-03-15 PROCEDURE — 96375 TX/PRO/DX INJ NEW DRUG ADDON: CPT

## 2025-03-15 PROCEDURE — 87077 CULTURE AEROBIC IDENTIFY: CPT | Mod: PARLAB | Performed by: NURSE PRACTITIONER

## 2025-03-15 PROCEDURE — 2500000001 HC RX 250 WO HCPCS SELF ADMINISTERED DRUGS (ALT 637 FOR MEDICARE OP)

## 2025-03-15 PROCEDURE — 70450 CT HEAD/BRAIN W/O DYE: CPT | Performed by: RADIOLOGY

## 2025-03-15 PROCEDURE — 85018 HEMOGLOBIN: CPT | Performed by: NURSE PRACTITIONER

## 2025-03-15 PROCEDURE — 3E033XZ INTRODUCTION OF VASOPRESSOR INTO PERIPHERAL VEIN, PERCUTANEOUS APPROACH: ICD-10-PCS | Performed by: NURSE PRACTITIONER

## 2025-03-15 PROCEDURE — 84484 ASSAY OF TROPONIN QUANT: CPT | Performed by: NURSE PRACTITIONER

## 2025-03-15 PROCEDURE — 71250 CT THORAX DX C-: CPT | Performed by: RADIOLOGY

## 2025-03-15 PROCEDURE — 83605 ASSAY OF LACTIC ACID: CPT | Performed by: NURSE PRACTITIONER

## 2025-03-15 PROCEDURE — 2500000004 HC RX 250 GENERAL PHARMACY W/ HCPCS (ALT 636 FOR OP/ED): Performed by: INTERNAL MEDICINE

## 2025-03-15 PROCEDURE — 71250 CT THORAX DX C-: CPT

## 2025-03-15 PROCEDURE — 2500000005 HC RX 250 GENERAL PHARMACY W/O HCPCS

## 2025-03-15 PROCEDURE — 99222 1ST HOSP IP/OBS MODERATE 55: CPT

## 2025-03-15 PROCEDURE — P9016 RBC LEUKOCYTES REDUCED: HCPCS

## 2025-03-15 PROCEDURE — 99291 CRITICAL CARE FIRST HOUR: CPT

## 2025-03-15 PROCEDURE — 71045 X-RAY EXAM CHEST 1 VIEW: CPT | Performed by: RADIOLOGY

## 2025-03-15 PROCEDURE — 2020000001 HC ICU ROOM DAILY

## 2025-03-15 PROCEDURE — 74176 CT ABD & PELVIS W/O CONTRAST: CPT | Performed by: RADIOLOGY

## 2025-03-15 PROCEDURE — 83690 ASSAY OF LIPASE: CPT | Performed by: NURSE PRACTITIONER

## 2025-03-15 PROCEDURE — 36430 TRANSFUSION BLD/BLD COMPNT: CPT

## 2025-03-15 PROCEDURE — 99291 CRITICAL CARE FIRST HOUR: CPT | Mod: 25 | Performed by: NURSE PRACTITIONER

## 2025-03-15 PROCEDURE — 87086 URINE CULTURE/COLONY COUNT: CPT | Mod: PARLAB | Performed by: NURSE PRACTITIONER

## 2025-03-15 PROCEDURE — 2500000004 HC RX 250 GENERAL PHARMACY W/ HCPCS (ALT 636 FOR OP/ED): Performed by: NURSE PRACTITIONER

## 2025-03-15 PROCEDURE — 85027 COMPLETE CBC AUTOMATED: CPT | Performed by: NURSE PRACTITIONER

## 2025-03-15 PROCEDURE — 36415 COLL VENOUS BLD VENIPUNCTURE: CPT | Performed by: NURSE PRACTITIONER

## 2025-03-15 PROCEDURE — 87636 SARSCOV2 & INF A&B AMP PRB: CPT | Performed by: NURSE PRACTITIONER

## 2025-03-15 PROCEDURE — 87641 MR-STAPH DNA AMP PROBE: CPT | Performed by: NURSE PRACTITIONER

## 2025-03-15 PROCEDURE — 85007 BL SMEAR W/DIFF WBC COUNT: CPT | Performed by: NURSE PRACTITIONER

## 2025-03-15 RX ORDER — DIVALPROEX SODIUM 250 MG/1
250 TABLET, DELAYED RELEASE ORAL EVERY 12 HOURS SCHEDULED
Status: DISCONTINUED | OUTPATIENT
Start: 2025-03-15 | End: 2025-03-19 | Stop reason: HOSPADM

## 2025-03-15 RX ORDER — ACETAMINOPHEN 325 MG/1
650 TABLET ORAL EVERY 6 HOURS
Status: DISCONTINUED | OUTPATIENT
Start: 2025-03-15 | End: 2025-03-19 | Stop reason: HOSPADM

## 2025-03-15 RX ORDER — VANCOMYCIN/0.9 % SOD CHLORIDE 1.5G/250ML
1.5 PLASTIC BAG, INJECTION (ML) INTRAVENOUS ONCE
Status: COMPLETED | OUTPATIENT
Start: 2025-03-15 | End: 2025-03-15

## 2025-03-15 RX ORDER — ATORVASTATIN CALCIUM 40 MG/1
40 TABLET, FILM COATED ORAL DAILY
Status: DISCONTINUED | OUTPATIENT
Start: 2025-03-15 | End: 2025-03-19 | Stop reason: HOSPADM

## 2025-03-15 RX ORDER — NOREPINEPHRINE BITARTRATE 0.03 MG/ML
0-.2 INJECTION, SOLUTION INTRAVENOUS CONTINUOUS
Status: DISCONTINUED | OUTPATIENT
Start: 2025-03-15 | End: 2025-03-17

## 2025-03-15 RX ORDER — AMIODARONE HYDROCHLORIDE 200 MG/1
200 TABLET ORAL DAILY
Status: DISCONTINUED | OUTPATIENT
Start: 2025-03-15 | End: 2025-03-19 | Stop reason: HOSPADM

## 2025-03-15 RX ORDER — RIVASTIGMINE 4.6 MG/24H
1 PATCH, EXTENDED RELEASE TRANSDERMAL DAILY
Status: DISCONTINUED | OUTPATIENT
Start: 2025-03-15 | End: 2025-03-19 | Stop reason: HOSPADM

## 2025-03-15 RX ORDER — METOPROLOL SUCCINATE 25 MG/1
25 TABLET, EXTENDED RELEASE ORAL DAILY
Status: DISCONTINUED | OUTPATIENT
Start: 2025-03-15 | End: 2025-03-19 | Stop reason: HOSPADM

## 2025-03-15 RX ORDER — ASPIRIN 81 MG/1
81 TABLET ORAL DAILY
Status: DISCONTINUED | OUTPATIENT
Start: 2025-03-15 | End: 2025-03-19 | Stop reason: HOSPADM

## 2025-03-15 RX ORDER — TAMSULOSIN HYDROCHLORIDE 0.4 MG/1
0.4 CAPSULE ORAL NIGHTLY
Status: DISCONTINUED | OUTPATIENT
Start: 2025-03-15 | End: 2025-03-19 | Stop reason: HOSPADM

## 2025-03-15 RX ORDER — FINASTERIDE 5 MG/1
5 TABLET, FILM COATED ORAL DAILY
Status: DISCONTINUED | OUTPATIENT
Start: 2025-03-15 | End: 2025-03-19 | Stop reason: HOSPADM

## 2025-03-15 RX ORDER — NOREPINEPHRINE BITARTRATE 0.03 MG/ML
INJECTION, SOLUTION INTRAVENOUS
Status: COMPLETED
Start: 2025-03-15 | End: 2025-03-15

## 2025-03-15 RX ADMIN — FINASTERIDE 5 MG: 5 TABLET, FILM COATED ORAL at 14:15

## 2025-03-15 RX ADMIN — PIPERACILLIN SODIUM AND TAZOBACTAM SODIUM 2.25 G: 2; .25 INJECTION, SOLUTION INTRAVENOUS at 21:00

## 2025-03-15 RX ADMIN — AMIODARONE HYDROCHLORIDE 200 MG: 200 TABLET ORAL at 14:15

## 2025-03-15 RX ADMIN — NOREPINEPHRINE BITARTRATE 0.03 MCG/KG/MIN: 0.03 INJECTION, SOLUTION INTRAVENOUS at 09:50

## 2025-03-15 RX ADMIN — Medication 1.5 G: at 11:51

## 2025-03-15 RX ADMIN — NOREPINEPHRINE BITARTRATE 0.03 MCG/KG/MIN: 32 SOLUTION INTRAVENOUS at 09:50

## 2025-03-15 RX ADMIN — PIPERACILLIN SODIUM AND TAZOBACTAM SODIUM 2.25 G: 2; .25 INJECTION, SOLUTION INTRAVENOUS at 14:51

## 2025-03-15 RX ADMIN — SODIUM CHLORIDE 1000 ML: 9 INJECTION, SOLUTION INTRAVENOUS at 08:27

## 2025-03-15 RX ADMIN — ACETAMINOPHEN 650 MG: 325 TABLET, FILM COATED ORAL at 23:46

## 2025-03-15 RX ADMIN — DIVALPROEX SODIUM 250 MG: 250 TABLET, DELAYED RELEASE ORAL at 16:34

## 2025-03-15 RX ADMIN — ATORVASTATIN CALCIUM 40 MG: 40 TABLET, FILM COATED ORAL at 14:15

## 2025-03-15 RX ADMIN — SODIUM CHLORIDE 500 ML: 9 INJECTION, SOLUTION INTRAVENOUS at 14:15

## 2025-03-15 RX ADMIN — PIPERACILLIN SODIUM AND TAZOBACTAM SODIUM 3.38 G: 3; .375 INJECTION, SOLUTION INTRAVENOUS at 08:27

## 2025-03-15 RX ADMIN — RIVASTIGMINE 1 PATCH: 4.6 PATCH, EXTENDED RELEASE TRANSDERMAL at 16:34

## 2025-03-15 SDOH — SOCIAL STABILITY: SOCIAL INSECURITY: HAS ANYONE EVER THREATENED TO HURT YOUR FAMILY OR YOUR PETS?: UNABLE TO ASSESS

## 2025-03-15 SDOH — SOCIAL STABILITY: SOCIAL INSECURITY: ABUSE: ADULT

## 2025-03-15 SDOH — SOCIAL STABILITY: SOCIAL INSECURITY: DO YOU FEEL UNSAFE GOING BACK TO THE PLACE WHERE YOU ARE LIVING?: UNABLE TO ASSESS

## 2025-03-15 SDOH — SOCIAL STABILITY: SOCIAL INSECURITY: HAVE YOU HAD ANY THOUGHTS OF HARMING ANYONE ELSE?: UNABLE TO ASSESS

## 2025-03-15 SDOH — SOCIAL STABILITY: SOCIAL INSECURITY: DOES ANYONE TRY TO KEEP YOU FROM HAVING/CONTACTING OTHER FRIENDS OR DOING THINGS OUTSIDE YOUR HOME?: UNABLE TO ASSESS

## 2025-03-15 SDOH — SOCIAL STABILITY: SOCIAL INSECURITY: HAVE YOU HAD THOUGHTS OF HARMING ANYONE ELSE?: UNABLE TO ASSESS

## 2025-03-15 SDOH — SOCIAL STABILITY: SOCIAL INSECURITY: DO YOU FEEL ANYONE HAS EXPLOITED OR TAKEN ADVANTAGE OF YOU FINANCIALLY OR OF YOUR PERSONAL PROPERTY?: UNABLE TO ASSESS

## 2025-03-15 SDOH — SOCIAL STABILITY: SOCIAL INSECURITY: ARE YOU OR HAVE YOU BEEN THREATENED OR ABUSED PHYSICALLY, EMOTIONALLY, OR SEXUALLY BY ANYONE?: UNABLE TO ASSESS

## 2025-03-15 ASSESSMENT — COGNITIVE AND FUNCTIONAL STATUS - GENERAL
CLIMB 3 TO 5 STEPS WITH RAILING: TOTAL
HELP NEEDED FOR BATHING: TOTAL
WALKING IN HOSPITAL ROOM: TOTAL
MOVING TO AND FROM BED TO CHAIR: TOTAL
TOILETING: TOTAL
DRESSING REGULAR LOWER BODY CLOTHING: TOTAL
DRESSING REGULAR UPPER BODY CLOTHING: TOTAL
MOVING FROM LYING ON BACK TO SITTING ON SIDE OF FLAT BED WITH BEDRAILS: A LOT
PERSONAL GROOMING: TOTAL
TURNING FROM BACK TO SIDE WHILE IN FLAT BAD: A LOT
DAILY ACTIVITIY SCORE: 6
MOBILITY SCORE: 8
EATING MEALS: TOTAL
PATIENT BASELINE BEDBOUND: UNABLE TO ASSESS AT THIS TIME
STANDING UP FROM CHAIR USING ARMS: TOTAL

## 2025-03-15 ASSESSMENT — ACTIVITIES OF DAILY LIVING (ADL)
FEEDING YOURSELF: UNABLE TO ASSESS
PATIENT'S MEMORY ADEQUATE TO SAFELY COMPLETE DAILY ACTIVITIES?: UNABLE TO ASSESS
DRESSING YOURSELF: UNABLE TO ASSESS
TOILETING: UNABLE TO ASSESS
HEARING - LEFT EAR: UNABLE TO ASSESS
LACK_OF_TRANSPORTATION: PATIENT UNABLE TO ANSWER
JUDGMENT_ADEQUATE_SAFELY_COMPLETE_DAILY_ACTIVITIES: UNABLE TO ASSESS
WALKS IN HOME: UNABLE TO ASSESS
HEARING - RIGHT EAR: UNABLE TO ASSESS
BATHING: UNABLE TO ASSESS
ADEQUATE_TO_COMPLETE_ADL: UNABLE TO ASSESS
GROOMING: UNABLE TO ASSESS

## 2025-03-15 ASSESSMENT — PAIN SCALES - GENERAL
PAINLEVEL_OUTOF10: 0 - NO PAIN

## 2025-03-15 ASSESSMENT — LIFESTYLE VARIABLES
HOW MANY STANDARD DRINKS CONTAINING ALCOHOL DO YOU HAVE ON A TYPICAL DAY: PATIENT UNABLE TO ANSWER
HOW OFTEN DO YOU HAVE 6 OR MORE DRINKS ON ONE OCCASION: PATIENT UNABLE TO ANSWER
AUDIT-C TOTAL SCORE: -1
AUDIT-C TOTAL SCORE: -1
SKIP TO QUESTIONS 9-10: 0
HOW OFTEN DO YOU HAVE A DRINK CONTAINING ALCOHOL: PATIENT UNABLE TO ANSWER

## 2025-03-15 ASSESSMENT — PATIENT HEALTH QUESTIONNAIRE - PHQ9
1. LITTLE INTEREST OR PLEASURE IN DOING THINGS: NOT AT ALL
SUM OF ALL RESPONSES TO PHQ9 QUESTIONS 1 & 2: 0
2. FEELING DOWN, DEPRESSED OR HOPELESS: NOT AT ALL

## 2025-03-15 ASSESSMENT — PAIN - FUNCTIONAL ASSESSMENT: PAIN_FUNCTIONAL_ASSESSMENT: 0-10

## 2025-03-15 NOTE — PROGRESS NOTES
Medication Adjustment    The following medication(s) was/were adjusted for Cheko Jin per protocol/policy due to altered renal function.    Medication(s) adjusted:   Zosyn 4.5 gram changed to 3.375 gram per CrCl of 28 ml/min (between 20 to 39 ml/min).     Please contact pharmacy with any questions.    Rebeca High, PharmD

## 2025-03-15 NOTE — CONSULTS
Reason For Consult  hematuria    History Of Present Illness  Cheko Jin is a 79 y.o. male with history of HTN, A-fib (on Eliquis), BPH, HLD, bladder cancer (followed at [facility]) who presents to [facility] on 3/15 for left-sided weakness and shaking.  Patient is at baseline of ANO x 1 so history comes from chart review.  A stroke alert was initiated given patient presentation, and during evaluation hematuria was noted in Iqbal catheter.  It is unclear how long patient has had catheter in place/how long hematuria has been present.  Patient denies any pain at this current time.  Unable to give significant ROS at this time.    Patient tachycardic tachypneic and hypoxic upon arrival to ED.  Stroke alert activated.  Patient with elevated creatinine at 3.52 (baseline around 1.9), elevated troponin, leukopenia.  CT chest/abdomen/pelvis with considerable circumferential bladder thickening with infiltration into adjacent fat, improved B/L hydroureteronephrosis.  Iqbal catheter in place upon arrival to ED and noted to contain ricky blood.  Catheter exchanged in ED and return of cherry red urine.  Patient admitted to the intensivist with consult to urology for hematuria     Past Medical History  He has a past medical history of Alzheimer's dementia (Multi), Atrial flutter (Multi), CAD (coronary artery disease), CKD (chronic kidney disease), Dilated cardiomyopathy (Multi), Gout, HFrEF (heart failure with reduced ejection fraction) (Multi), Hypertension, Malignant neoplasm of bladder, unspecified (Multi), NSVT (nonsustained ventricular tachycardia) (Multi), Sleep apnea, and Stroke (cerebrum) (Multi).    Surgical History  He has a past surgical history that includes Hernia repair (Bilateral) and Transurethral resection of prostate.     Social History  He reports that he has never smoked. He has never been exposed to tobacco smoke. He has never used smokeless tobacco. He reports that he does not currently use alcohol. Drug use  "questions deferred to the physician.    Family History  Family History   Problem Relation Name Age of Onset    No Known Problems Mother      No Known Problems Father          Allergies  Trazodone, Cetirizine, Isosorbide, Levofloxacin, Lisinopril, Loratadine, and Statins-hmg-coa reductase inhibitors    Review of Systems  10 point review of systems negative with pertinent positives noted in HPI.       Physical Exam  Physical Exam  Vitals reviewed.   Constitutional:       General: He is not in acute distress.     Appearance: He is ill-appearing.   HENT:      Head:      Comments: Sutured laceration to left forehead, dried blood surrounding wound.   Abdominal:      Palpations: Abdomen is soft.      Tenderness: There is no abdominal tenderness.   Genitourinary:     Comments: Iqbal in place and draining cherry red urine/blood  Skin:     General: Skin is warm and dry.      Coloration: Skin is pale.   Neurological:      Mental Status: Mental status is at baseline.      Comments: Oriented to self only.    Psychiatric:         Behavior: Behavior is cooperative.            Last Recorded Vitals  Blood pressure 100/55, pulse 98, temperature 37.2 °C (99 °F), temperature source Temporal, resp. rate (!) 37, height 1.778 m (5' 10\"), weight 73 kg (160 lb 15 oz), SpO2 95%.    Relevant Results  Results for orders placed or performed during the hospital encounter of 03/15/25 (from the past 24 hours)   POCT GLUCOSE   Result Value Ref Range    POCT Glucose 114 (H) 74 - 99 mg/dL   CBC and Auto Differential   Result Value Ref Range    WBC 0.7 (LL) 4.4 - 11.3 x10*3/uL    nRBC 0.0 0.0 - 0.0 /100 WBCs    RBC 3.71 (L) 4.50 - 5.90 x10*6/uL    Hemoglobin 11.4 (L) 13.5 - 17.5 g/dL    Hematocrit 35.0 (L) 41.0 - 52.0 %    MCV 94 80 - 100 fL    MCH 30.7 26.0 - 34.0 pg    MCHC 32.6 32.0 - 36.0 g/dL    RDW 14.2 11.5 - 14.5 %    Platelets 106 (L) 150 - 450 x10*3/uL    Immature Granulocytes %, Automated 0.0 0.0 - 0.9 %    Immature Granulocytes Absolute, " Automated 0.00 0.00 - 0.50 x10*3/uL   Comprehensive metabolic panel   Result Value Ref Range    Glucose 116 (H) 74 - 99 mg/dL    Sodium 136 136 - 145 mmol/L    Potassium 3.8 3.5 - 5.3 mmol/L    Chloride 103 98 - 107 mmol/L    Bicarbonate 17 (L) 21 - 32 mmol/L    Anion Gap 20 10 - 20 mmol/L    Urea Nitrogen 50 (H) 6 - 23 mg/dL    Creatinine 3.52 (H) 0.50 - 1.30 mg/dL    eGFR 17 (L) >60 mL/min/1.73m*2    Calcium 8.9 8.6 - 10.3 mg/dL    Albumin 3.5 3.4 - 5.0 g/dL    Alkaline Phosphatase 54 33 - 136 U/L    Total Protein 6.3 (L) 6.4 - 8.2 g/dL    AST 19 9 - 39 U/L    Bilirubin, Total 1.3 (H) 0.0 - 1.2 mg/dL    ALT 10 10 - 52 U/L   Troponin I, High Sensitivity   Result Value Ref Range    Troponin I, High Sensitivity 57 (HH) 0 - 20 ng/L   Protime-INR   Result Value Ref Range    Protime 17.3 (H) 9.8 - 12.4 seconds    INR 1.6 (H) 0.9 - 1.1   APTT   Result Value Ref Range    aPTT 28 26 - 36 seconds   Lipase   Result Value Ref Range    Lipase 5 (L) 9 - 82 U/L   Lactate   Result Value Ref Range    Lactate 3.3 (H) 0.4 - 2.0 mmol/L   Blood Gas Venous Full Panel   Result Value Ref Range    POCT pH, Venous 7.46 (H) 7.33 - 7.43 pH    POCT pCO2, Venous 28 (L) 41 - 51 mm Hg    POCT pO2, Venous 55 (H) 35 - 45 mm Hg    POCT SO2, Venous 88 (H) 45 - 75 %    POCT Oxy Hemoglobin, Venous 85.8 (H) 45.0 - 75.0 %    POCT Hematocrit Calculated, Venous 39.0 (L) 41.0 - 52.0 %    POCT Sodium, Venous 134 (L) 136 - 145 mmol/L    POCT Potassium, Venous 4.0 3.5 - 5.3 mmol/L    POCT Chloride, Venous 104 98 - 107 mmol/L    POCT Ionized Calicum, Venous 1.20 1.10 - 1.33 mmol/L    POCT Glucose, Venous 102 (H) 74 - 99 mg/dL    POCT Lactate, Venous 3.8 (H) 0.4 - 2.0 mmol/L    POCT Base Excess, Venous -2.7 (L) -2.0 - 3.0 mmol/L    POCT HCO3 Calculated, Venous 19.9 (L) 22.0 - 26.0 mmol/L    POCT Hemoglobin, Venous 13.0 (L) 13.5 - 17.5 g/dL    POCT Anion Gap, Venous 14.0 10.0 - 25.0 mmol/L    Patient Temperature 37.0 degrees Celsius    FiO2 28 %   Influenza A,  and B PCR   Result Value Ref Range    Flu A Result Not Detected Not Detected    Flu B Result Not Detected Not Detected   Sars-CoV-2 PCR   Result Value Ref Range    Coronavirus 2019, PCR Not Detected Not Detected   Manual Differential   Result Value Ref Range    Neutrophils %, Manual 54.0 40.0 - 80.0 %    Bands %, Manual 27.0 0.0 - 5.0 %    Lymphocytes %, Manual 17.0 13.0 - 44.0 %    Monocytes %, Manual 1.0 2.0 - 10.0 %    Eosinophils %, Manual 0.0 0.0 - 6.0 %    Basophils %, Manual 0.0 0.0 - 2.0 %    Metamyelocytes %, Manual 1.0 0.0 - 0.0 %    Seg Neutrophils Absolute, Manual 0.38 (L) 1.60 - 5.00 x10*3/uL    Bands Absolute, Manual 0.19 0.00 - 0.50 x10*3/uL    Lymphocytes Absolute, Manual 0.12 (L) 0.80 - 3.00 x10*3/uL    Monocytes Absolute, Manual 0.01 (L) 0.05 - 0.80 x10*3/uL    Eosinophils Absolute, Manual 0.00 0.00 - 0.40 x10*3/uL    Basophils Absolute, Manual 0.00 0.00 - 0.10 x10*3/uL    Metamyelocytes Absolute, Manual 0.01 0.00 - 0.00 x10*3/uL    Total Cells Counted 100     Neutrophils Absolute, Manual 0.57 (L) 1.60 - 5.50 x10*3/uL    RBC Morphology See Below     Polychromasia Mild     RBC Fragments Few     Ovalocytes Few     Teardrop Cells Few     Acanthocytes Few     Giant Platelets Few    Urinalysis with Reflex Culture and Microscopic   Result Value Ref Range    Color, Urine Red (N) Straw, Yellow    Appearance, Urine Turbid (N) Clear    Specific Gravity, Urine 1.025 1.005 - 1.035    pH, Urine 6.0 5.0, 5.5, 6.0, 6.5, 7.0, 7.5, 8.0    Protein, Urine 300 (3+) (A) NEGATIVE, 10 (TRACE) mg/dL    Glucose, Urine NEGATIVE NEGATIVE mg/dL    Blood, Urine 1.0 (3+) (A) NEGATIVE mg/dL    Ketones, Urine NEGATIVE NEGATIVE mg/dL    Bilirubin, Urine NEGATIVE NEGATIVE mg/dL    Urobilinogen, Urine NORM NORM mg/dL    Nitrite, Urine NEGATIVE NEGATIVE    Leukocyte Esterase, Urine 500 Genny/uL (A) NEGATIVE   Type And Screen   Result Value Ref Range    ABO TYPE O     Rh TYPE POS     ANTIBODY SCREEN NEG    Microscopic Only, Urine    Result Value Ref Range    WBC, Urine >50 (A) 1-5, NONE /HPF    WBC Clumps, Urine FEW Reference range not established. /HPF    RBC, Urine >20 (A) NONE, 1-2, 3-5 /HPF    Bacteria, Urine 4+ (A) NONE SEEN /HPF   MRSA Surveillance for Vancomycin De-escalation, PCR    Specimen: Anterior Nares; Swab   Result Value Ref Range    MRSA PCR Not Detected Not Detected   B-Type Natriuretic Peptide   Result Value Ref Range     (H) 0 - 99 pg/mL   CBC and Auto Differential   Result Value Ref Range    WBC 3.6 (L) 4.4 - 11.3 x10*3/uL    nRBC 0.0 0.0 - 0.0 /100 WBCs    RBC 3.12 (L) 4.50 - 5.90 x10*6/uL    Hemoglobin 9.7 (L) 13.5 - 17.5 g/dL    Hematocrit 29.1 (L) 41.0 - 52.0 %    MCV 93 80 - 100 fL    MCH 31.1 26.0 - 34.0 pg    MCHC 33.3 32.0 - 36.0 g/dL    RDW 14.3 11.5 - 14.5 %    Platelets 113 (L) 150 - 450 x10*3/uL    Immature Granulocytes %, Automated 0.3 0.0 - 0.9 %    Immature Granulocytes Absolute, Automated 0.01 0.00 - 0.50 x10*3/uL   Manual Differential   Result Value Ref Range    Neutrophils %, Manual 44.0 40.0 - 80.0 %    Bands %, Manual 47.0 0.0 - 5.0 %    Lymphocytes %, Manual 2.0 13.0 - 44.0 %    Monocytes %, Manual 4.0 2.0 - 10.0 %    Eosinophils %, Manual 0.0 0.0 - 6.0 %    Basophils %, Manual 0.0 0.0 - 2.0 %    Metamyelocytes %, Manual 3.0 0.0 - 0.0 %    Seg Neutrophils Absolute, Manual 1.58 (L) 1.60 - 5.00 x10*3/uL    Bands Absolute, Manual 1.69 (H) 0.00 - 0.50 x10*3/uL    Lymphocytes Absolute, Manual 0.07 (L) 0.80 - 3.00 x10*3/uL    Monocytes Absolute, Manual 0.14 0.05 - 0.80 x10*3/uL    Eosinophils Absolute, Manual 0.00 0.00 - 0.40 x10*3/uL    Basophils Absolute, Manual 0.00 0.00 - 0.10 x10*3/uL    Metamyelocytes Absolute, Manual 0.11 0.00 - 0.00 x10*3/uL    Total Cells Counted 100     Neutrophils Absolute, Manual 3.27 1.60 - 5.50 x10*3/uL    RBC Morphology See Below     Polychromasia Mild     Ovalocytes Few     Teardrop Cells Few     Vacuolated Neutrophils Present     Clumped Platelets Present    Lactate    Result Value Ref Range    Lactate 2.4 (H) 0.4 - 2.0 mmol/L   Troponin I, High Sensitivity   Result Value Ref Range    Troponin I, High Sensitivity 71 (HH) 0 - 20 ng/L   Prepare RBC: 1 Units   Result Value Ref Range    PRODUCT CODE N0305C71     Unit Number [id number]     Unit ABO O     Unit RH POS     XM INTEP COMP     Dispense Status TR     Blood Expiration Date 4/17/2025 11:59:00 PM EDT     PRODUCT BLOOD TYPE 5100     UNIT VOLUME 284    Prepare Plasma: 1 Units   Result Value Ref Range    PRODUCT CODE P4036H47     Unit Number [id number]     Unit ABO O     Unit RH POS     Dispense Status XM     Blood Expiration Date 3/20/2025 12:14:00 PM EDT     PRODUCT BLOOD TYPE 5100     UNIT VOLUME 216      CT chest abdomen pelvis wo IV contrast    Result Date: 3/15/2025  Interpreted By:  Schoenberger, Joseph, STUDY: CT CHEST ABDOMEN PELVIS WO CONTRAST;  3/15/2025 9:16 am   INDICATION: Signs/Symptoms:septic.     COMPARISON: 03/13/2025   ACCESSION NUMBER(S): [id number]   ORDERING CLINICIAN: YOVANI VERMA   TECHNIQUE: CT of the chest, abdomen and pelvis was performed. Contiguous axial images were obtained at 3 mm slice thickness through the chest, abdomen and pelvis. Coronal and sagittal reconstructions at 3 mm slice thickness were performed.  No intravenous contrast was administered; positive oral contrast was given.   FINDINGS: Please note that the study is limited without intravenous contrast.   CHEST:     LUNG/PLEURA/LARGE AIRWAYS: Evaluation of the lung parenchyma is compromised by motion. The large airways appear intact. There is no pleural effusion or pneumothorax. No definite focal lung opacity new from prior lymph within limits of this exam. There is some dependent atelectasis in both posterior costophrenic angles which is more prominent than prior.   VESSELS: Aorta and pulmonary arteries are normal caliber. Mild atherosclerotic changes are noted of the aorta and branching vessels.  Severe coronary artery  calcifications are present.   HEART: Normal size.  No pericardial effusion   MEDIASTINUM AND EVERARDO: No mediastinal, hilar or axillary lymph nodes are present.  The esophagus appears normal.   CHEST WALL AND LOWER NECK: The soft tissues of the chest wall appear normal. The thyroid appears normal. No supraclavicular adenopathy.   ABDOMEN:   LIVER: Liver: Within normal limits.   BILE DUCTS: Bile ducts: Normal caliber.   GALLBLADDER: Dependent layering calcified gallstones   PANCREAS: The pancreas appears unremarkable.   SPLEEN: Within normal limits.   ADRENAL GLANDS: Within normal limits.   KIDNEYS AND URETERS: No renal parenchymal lesions. Slight perinephric infiltration all somewhat less than prior. There is very mild bilateral hydroureteronephrosis. This is improved from the prior.   PELVIS:   BLADDER: Decompressed by Iqbal catheter. Likely significant circumferential wall thickening. This is similar to the prior exam. Infiltration of the adjacent fat persists but is improved.   REPRODUCTIVE ORGANS: Unremarkable   BOWEL: CT appearance of the stomach is unremarkable. Small and large bowel loops are normal in caliber without mural thickening. There is considerable colonic diverticulosis mostly in the sigmoid colon without acute diverticulitis. Normal appendix.   VESSELS: The aorta and IVC are within normal limits.   PERITONEUM/RETROPERITONEUM/LYMPH NODES: No ascites or free air, no fluid collection.  The retroperitoneum appears unremarkable.  No abdominopelvic lymphadenopathy is present.   ABDOMINAL WALL: Intact   BONES: No suspicious osseous lesions are identified.       Chest 1.  No definite acute thoracic findings within limits of this exam.   Abdomen-Pelvis 1.  Considerable circumferential bladder wall thickening with infiltration of the adjacent fat this is less pronounced than the prior exam. There is associated bilateral hydro ureteral nephrosis. However this is very mild and is notably improved from the prior  exam.     MACRO: None   Signed by: Joseph Schoenberger 3/15/2025 9:46 AM Dictation workstation:   [id number]    XR chest 1 view    Result Date: 3/15/2025  Interpreted By:  Schoenberger, Joseph, STUDY: XR CHEST 1 VIEW;  3/15/2025 7:30 am   INDICATION: Signs/Symptoms:SOB.     COMPARISON: 03/13/2025   ACCESSION NUMBER(S): [id number]   ORDERING CLINICIAN: YOVANI VERMA   FINDINGS:         CARDIOMEDIASTINAL SILHOUETTE: Cardiomediastinal silhouette is normal in size and configuration.   LUNGS: Lungs are clear.   ABDOMEN: No remarkable upper abdominal findings.   BONES: No acute osseous changes.       1.  No evidence of acute cardiopulmonary process.       MACRO: None   Signed by: Joseph Schoenberger 3/15/2025 7:46 AM Dictation workstation:   [id number]    CT brain attack head wo IV contrast    Result Date: 3/15/2025  Interpreted By:  Schoenberger, Joseph, STUDY: CT BRAIN ATTACK HEAD WO IV CONTRAST;  3/15/2025 7:05 am   INDICATION: Signs/Symptoms:Stroke Evaluation.     COMPARISON: None.   ACCESSION NUMBER(S): [id number]   ORDERING CLINICIAN: YOVANI VERMA   TECHNIQUE: Noncontrast axial CT scan of head was performed. Angled reformats in brain and bone windows were generated. The images were reviewed in bone, brain, blood and soft tissue windows.   FINDINGS: CSF Spaces: Enlarged due to parenchymal volume loss. Normal configuration with the tech basal cisterns. There is no extraaxial fluid collection.   Parenchyma:  Mild periventricular deep white matter hypoattenuation. Gray-white junction intact. No intracranial hemorrhage.   Calvarium: The calvarium is unremarkable.   Paranasal sinuses and mastoids: Visualized paranasal sinuses and mastoids are clear.       No evidence of acute cortical infarct or intracranial hemorrhage.   No evidence of intracranial hemorrhage or displaced skull fracture.   MACRO: Joseph Schoenberger discussed the significance and urgency of this critical finding by telephone with   YOVANI COURTNEYMARRY on 3/15/2025 at 7:23 am.  (**-RCF-**) Findings:  BAT.     Signed by: Joseph Schoenberger 3/15/2025 7:23 AM Dictation workstation:   [id number]    ECG 12 lead    Result Date: 3/14/2025  Normal sinus rhythm Nonspecific ST and T wave abnormality Abnormal ECG When compared with ECG of 25-FEB-2025 12:15, PREVIOUS ECG IS PRESENT See ED provider note for full interpretation and clinical correlation Confirmed by Tomasz Foley ([id number]) on 3/14/2025 3:36:32 PM    CT cervical spine wo IV contrast    Result Date: 3/14/2025  Interpreted By:  Coral Hill, STUDY: CT CERVICAL SPINE WO IV CONTRAST; ;  3/14/2025 2:40 pm   INDICATION: Signs/Symptoms:hia.     COMPARISON: 03/13/2025   ACCESSION NUMBER(S): [id number]   ORDERING CLINICIAN: SHARON KHAN   TECHNIQUE: Serial axial CT images obtained of the cervical spine. Images reformatted in the coronal and sagittal projection   All CT examinations are performed with 1 or more of the following dose reduction techniques: Automated exposure control, adjustment of mA and/or kv according to patient's size, or use of iterative reconstruction techniques.   FINDINGS: Alignment of the cervical spine demonstrates mild retrolisthesis of C4 on C5 measuring 2 mm. Also, there is a anterolisthesis of C7 on T1 measuring 4 mm as seen on prior imaging. Multilevel endplate degenerative changes and sclerosis with severe loss disc space height in particular C4/5 through C6/7 with multilevel anterior and posterior disc osteophyte complex. There is unremarkable skull base and occipital condyles. Ring of C1 is intact. Dens and remainder of C2 is unremarkable. There is no acute osseous abnormality in the cervical spine   Evaluation of spinal levels are as follows:   C2/3 demonstrates mild posterior disc osteophyte complex. There is moderate bilateral foraminal narrowing relation to facet and uncovertebral joint hypertrophy   C3/4 demonstrates mild posterior disc osteophyte complex  with mild canal stenosis. There is mild bilateral foraminal narrowing.   C4/5 demonstrates posterior disc osteophyte complex and a least moderate canal stenosis with severe bilateral foraminal narrowing.   C5/6 demonstrates posterior disc osteophyte complex and mild canal stenosis. There is moderate bilateral foraminal narrowing.   C6/7 demonstrates posterior disc osteophyte complex and uncovertebral joint hypertrophy in particular right-sided with mild canal stenosis. There is moderate right and mild left foraminal narrowing   Included lung apices are unremarkable. Visualized portions soft tissue neck are unremarkable.               1. No acute osseous abnormality cervical spine.   2. Multilevel degenerative discogenic changes as described above with moderate canal stenosis C4/5 and mild canal stenosis C3/4, C5/6 and C6/7   3. Multilevel foraminal narrowing as described above.     MACRO: None   Signed by: Coral Hill 3/14/2025 2:54 PM Dictation workstation:   [id number]    CT head wo IV contrast    Result Date: 3/14/2025  Interpreted By:  Coral Hill, STUDY: CT HEAD WO IV CONTRAST; ;  3/14/2025 2:40 pm   INDICATION: Signs/Symptoms:HIA.     COMPARISON: 03/13/2025   ACCESSION NUMBER(S): [id number]   ORDERING CLINICIAN: SHARON KHAN   TECHNIQUE: Serial axial unenhanced CT images obtained of the head. Images reformatted in the coronal and sagittal projection.   FINDINGS: Moderate parenchymal volume loss with prominence of the ventricles, sulci, and cisterns. Gray-white matter differentiation maintained. There is mild periventricular and subcortical white matter change nonspecific and likely related to chronic small-vessel ischemic change. No intra-axial or extra-axial blood or fluid collections are identified. There is a small remote right frontal cortical infarct about the sylvian fissure with focal loss of gray-white differentiation.   Visualized osseous structures demonstrate unremarkable paranasal  sinuses and mastoid air cells.       1. No acute intracranial abnormality.   2. Small remote cortical infarct in the right frontal lobe about the sylvian fissure.   3. Periventricular and subcortical white matter change nonspecific and likely related to chronic small-vessel ischemic change.     MACRO: None   Signed by: Coral Hill 3/14/2025 2:47 PM Dictation workstation:   [id number]    XR hips bilateral 3 or 4 VW w pelvis when performed    Result Date: 3/13/2025  Interpreted By:  Mando Rodriguez, STUDY: XR HIPS BILATERAL 3 OR 4 VW WITH PELVIS WHEN PERFORMED; ;  3/13/2025 7:30 pm   INDICATION: Signs/Symptoms:fall.     COMPARISON: None.   ACCESSION NUMBER(S): [id number]   ORDERING CLINICIAN: TRACIE VILLARREAL   FINDINGS: Bilateral hips, five views   Right total hip arthroplasty in place. There is no periprosthetic fracture or lucency. There is no dislocation. The left hip is unremarkable.       No hardware failure about the right total hip arthroplasty. No acute abnormality seen in the pelvis     MACRO: None   Signed by: Mando Rodriguez 3/13/2025 7:41 PM Dictation workstation:   [id number]    XR chest 1 view    Result Date: 3/13/2025  Interpreted By:  Mando Rodriguez, STUDY: XR CHEST 1 VIEW;  3/13/2025 7:30 pm   INDICATION: Signs/Symptoms:fall.     COMPARISON: 02/25/2025   ACCESSION NUMBER(S): [id number]   ORDERING CLINICIAN: TRACIE VILLARREAL   FINDINGS:         CARDIOMEDIASTINAL SILHOUETTE: Cardiomediastinal silhouette is normal in size and configuration.   LUNGS: Lungs are clear.   ABDOMEN: No remarkable upper abdominal findings.   BONES: No acute osseous changes.       1.  No evidence of acute cardiopulmonary process.       MACRO: None   Signed by: Mando Rodriguez 3/13/2025 7:41 PM Dictation workstation:   [id number]    CT chest abdomen pelvis wo IV contrast    Result Date: 3/13/2025  Interpreted By:  Lacey Deleon, STUDY: CT CHEST ABDOMEN PELVIS WO CONTRAST;  3/13/2025 7:03 pm   INDICATION:  Signs/Symptoms:fall     COMPARISON: 06/01/2024   ACCESSION NUMBER(S): [id number]   ORDERING CLINICIAN: TRACIE VILLARREAL   TECHNIQUE: CT of the chest, abdomen, and pelvis was performed. Contiguous axial images were obtained at  5 mm slice thickness through the chest, and at  3 mm through the abdomen and pelvis. Coronal and sagittal reconstructions at  3 mm slice thickness were performed.   FINDINGS: CHEST:   LUNG/PLEURA/LARGE AIRWAYS: No air space opacity, focal consolidation, pleural effusion/hemothorax, or pneumothorax are appreciated.  There are no discrete pulmonary nodules.   VESSELS: Wall calcification of the aorta similar to prior. Tortuous aorta but normal in caliber. Main pulmonary artery and its branches are normal in caliber.  Severe coronary artery calcifications are seen. The study is not optimized for evaluation of coronary arteries. Please note that evaluation of mediastinum and vascular structures is limited without IV contrast.   HEART: The heart is normal in size.   There is no pericardial effusion.   MEDIASTINUM AND EVERARDO: No pneumomediastinum, abnormal mediastinal fluid collection or mediastinal hematoma are appreciated.  No mediastinal, hilar or biaxillary adenopathy is present.  The esophagus is normal in course and caliber.   CHEST WALL AND LOWER NECK: Healing fractures of the left posterior 7th, 9th and 10th ribs. No suspicious osseous lesions are identified.  Advanced degenerative changes of bilateral shoulder joints. Findings include joint space narrowing, subchondral sclerosis and heterotopic bone formation/spurring. Findings similar to prior study.   ABDOMEN:   LIVER: No focal perfusion abnormality of the liver is appreciated to suggest contusion or laceration. There is no subcapsular hematoma, no perihepatic fluid collection.   GALLBLADDER: Multiple small gallbladder calculi. No pericholecystic free fluid.   BILE DUCTS: The intahepatic and extrahepatic bile ducts are not dilated.    PANCREAS: The pancreas appears unremarkable.   SPLEEN: No parenchymal perfusion deficit of the spleen is appreciated to suggest contusion or laceration. There is no subcapsular hematoma, no perisplenic fluid collection.   ADRENAL GLANDS: Within normal limits.   KIDNEYS AND URETERS: Moderate bilateral hydronephrosis and perinephric stranding, however, no obstructing calculi seen bilaterally. Evaluation of distal ureters however limited to streaking artifacts in the pelvis from right hip hardware replacement.  No radiodense urinary tract stones are seen.   PELVIS:   BLADDER: Marked diffuse irregular wall thickening of the bladder with surrounding fat stranding concerning for cystitis or malignancy. No calculi are seen within limitation of this exam. Hyperdensity of bladder mucosa which could be related to hemorrhagic cystitis in the appropriate clinical settings. These findings were not seen in the previous exam.   REPRODUCTIVE ORGANS: Fat stranding of the pelvis but no fluid collection or masses are seen. No pelvic lymphadenopathy within limitation of this exam due to streaking artifacts and lack of IV contrast.   BOWEL: Diffuse colonic diverticulosis but no CT evidence for acute diverticulitis. Findings most prominent in the area of sigmoid. Surgical clips are seen within the left upper quadrant. Correlation with surgical history recommended. Small and large bowel are nondilated. Presumed appendix normal. No mesenteric edema or lymphadenopathy.   VESSELS: Diffuse wall calcification of the aorta and its main branches. No aneurysm. Evaluation limited without IV contrast.   PERITONEUM/RETROPERITONEUM/LYMPH NODES: There is no evidence of intra- or retroperitoneal hematoma.  There is no free or loculated fluid collection, no free intraperitoneal air. No abdominopelvic lymphadenopathy is present.   BONES AND ABDOMINAL WALL: No evidence of acute fracture or dislocation of the included osseous structures.  No suspicious  osseous lesions are identified.  Advanced multilevel degenerative changes throughout the spine including disc space narrowing, endplate sclerosis, spurring and vacuum disc phenomena. Facet joint sclerosis and hypertrophy bilaterally throughout the spine.       CHEST 1.  Healing fractures of the left lower ribs as above including posterior aspect of the left 7th, 9th and 10th ribs. No pneumothorax. No CT evidence for acute intrathoracic injury is suspected. 2. Heavy diffuse coronary artery disease. 3. Additional detailed findings as above.   ABDOMEN - PELVIS 1.  Irregular hyperdense moderate wall thickening of the urinary bladder, nonspecific in etiology may represent inflammatory, infectious or malignant etiology. Hemorrhagic cystitis should be considered. 2. Moderate bilateral hydronephrosis and hydroureter with perinephric stranding but no obstructing calculus seen bilaterally. Findings could be related to bladder pathology. Clinical correlation and with urinalysis recommended. Urology consult also recommended. 3. Colonic diverticulosis but no CT evidence for acute diverticulitis. 4. Diffuse degenerative changes throughout the thoracic and lumbar spine. No destructive bone lesions. No acute fractures. 5. Cholelithiasis but no secondary signs of acute cholecystitis. 6. Additional detailed findings as above.     MACRO: None   Signed by: Lacey Deleon 3/13/2025 7:37 PM Dictation workstation:   [id number]    CT cervical spine wo IV contrast    Result Date: 3/13/2025  Interpreted By:  Lacey Deleon, STUDY: CT CERVICAL SPINE WO IV CONTRAST;  3/13/2025 7:03 pm   INDICATION: Signs/Symptoms:fall.   COMPARISON: None.   ACCESSION NUMBER(S): [id number]   ORDERING CLINICIAN: TRACIE VILLARREAL   TECHNIQUE: Axial CT images of the cervical spine are obtained. Axial, coronal and sagittal reconstructions are provided for review.   FINDINGS:     Fractures: There is no evidence for an acute fracture of the cervical spine.   Vertebral  Alignment: Within normal limits.   Craniocervical Junction: The odontoid process and craniocervical junction are intact.   Vertebrae/Disc Spaces:  Advanced multilevel discogenic degenerative changes including disc space narrowing, endplate sclerosis, spurring and posterior disc osteophyte complex similar to previous exam, worse from C3-C7. Findings unchanged since previous exam. 2 mm anterolisthesis of C3 on C4, 2 mm retrolisthesis of C4 on C5 and 3 mm anterolisthesis of C7 on T1, stable. Facet joint sclerosis and hypertrophy bilateral multiple levels.   Prevertebral/Paraspinal Soft Tissues: No prevertebral soft tissue swelling.   Heavy wall calcification of bilateral carotid bulbs, worse on the left. Findings similar to prior.       1. Advanced multilevel degenerative changes similar to previous exam, stable. No evidence for an acute fracture or subluxation of the cervical spine. No significant interval changes since prior with additional findings as described.   MACRO: None   Signed by: Lacey Deleon 3/13/2025 7:25 PM Dictation workstation:   [id number]    CT head wo IV contrast    Result Date: 3/13/2025  Interpreted By:  Lacey Deleon, STUDY: CT HEAD WO IV CONTRAST;  3/13/2025 7:03 pm   INDICATION: Signs/Symptoms:fall.   COMPARISON: 02/25/2025   ACCESSION NUMBER(S): [id number]   ORDERING CLINICIAN: TRACIE VILLARREAL   TECHNIQUE: Noncontrast axial CT scan of head was performed. Angled reformats in brain and bone windows were generated. The images were reviewed in bone, brain, blood and soft tissue windows.   FINDINGS: Limited study to patient's motion artifacts. CSF Spaces: Marked brain atrophy similar to prior evidence by prominence of ventricles, sulci and cisterns. There is no extraaxial fluid collection.   Parenchyma: Confluent areas of subcortical and periventricular white matter changes which given patient's age are suggestive of chronic small vessel ischemic disease. The grey-white differentiation is intact.  There is no mass effect or midline shift.  There is no intracranial hemorrhage.   Calvarium: The calvarium is unremarkable.   Paranasal sinuses and mastoids: Visualized paranasal sinuses and mastoids are clear.       Stable marked brain atrophy and associated findings suggestive of chronic small vessel ischemic disease. No evidence of acute cortical infarct or intracranial hemorrhage. Please note that study is limited to patient's motion artifacts.   MACRO: None   Signed by: Lacey Deleon 3/13/2025 7:23 PM Dictation workstation:   [id number]        Assessment/Plan   Cheko Jin is a 79 y.o. male with history of HTN, A-fib (on Eliquis), BPH, HLD, bladder cancer (followed at [facility]) who presents to [facility] on 3/15 for left-sided weakness and shaking. Urology service consulted for hematuria.     Impression  - hematuria   - STUART  - Bladder wall thickening  - B/L hydroureteronephrosis; Improved from 3/13  - Leukopenia  - Anemia     Recommendations  - Agree with holding Eliquis in setting of hematuria  - Maintain zamorano catheter    > may need manual irrigation if decreased urine return  - Trend BMP for elevated creatinine  - Remainder of care per primary team     Patient discussed with attending surgeon, Dr. Reveles.    Emma Byrd PA-C

## 2025-03-15 NOTE — ED PROVIDER NOTES
EMERGENCY DEPARTMENT ENCOUNTER      Pt Name: Cheko Jin  MRN: [id number]  Birthdate 1945  Date of evaluation: 3/15/2025  Provider: PATIENCE Navarro-CNP    CHIEF COMPLAINT       Chief Complaint   Patient presents with    Weakness, Gen       HISTORY OF PRESENT ILLNESS    Cheko Jin is a 79 y.o. male who presents to the emergency department by EMS as a stroke alert activation from [facility] for evaluation of shaking and altered mental status.  Per report patient did fall 2 days ago, suffering a laceration to the left forehead is on Eliquis.  Today they state that they found the patient shaking in his bed, normally ANO x 1 but was not responding appropriately.  Blood glucose was normal.  Upon ED arrival EMS who was the same squad who saw him 2 days ago and brought to our emergency department state that he is back to his baseline.  He follows commands and states his name appropriately which is baseline.  They deny any additional concerns noted with the patient at this time.  Patient has no history of seizures and EMS states that they witnessed the shaking episode but patient was speaking through it and they do not believe it was a seizure.    Nursing Notes were reviewed.    History provided by EMS/nursing home.  No  used.    REVIEW OF SYSTEMS     ROS is otherwise limited due to the patient's dementia at baseline.    PAST MEDICAL HISTORY     Past Medical History:   Diagnosis Date    Alzheimer's dementia (Multi)     Atrial flutter (Multi)     CAD (coronary artery disease)     nonobstructive    CKD (chronic kidney disease)     Dilated cardiomyopathy (Multi)     Gout     HFrEF (heart failure with reduced ejection fraction) (Multi)     Hypertension     Malignant neoplasm of bladder, unspecified (Multi)     NSVT (nonsustained ventricular tachycardia) (Multi)     Lifevest placed    Sleep apnea     Stroke (cerebrum) (Multi)     3 strokes R frontal, parietal lobes (July,  2010, September, 2010 and October, 2010), etiology embolic due to 40-50% ulcerated R ICA plaque s/p R CEA 10/12/10       SURGICAL HISTORY       Past Surgical History:   Procedure Laterality Date    HERNIA REPAIR Bilateral     TRANSURETHRAL RESECTION OF PROSTATE         ALLERGIES     Trazodone, Cetirizine, Isosorbide, Levofloxacin, Lisinopril, Loratadine, and Statins-hmg-coa reductase inhibitors    FAMILY HISTORY       Family History   Problem Relation Name Age of Onset    No Known Problems Mother      No Known Problems Father          SOCIAL HISTORY       Social History     Socioeconomic History    Marital status: Single   Tobacco Use    Smoking status: Never     Passive exposure: Never    Smokeless tobacco: Never   Vaping Use    Vaping status: Never Used   Substance and Sexual Activity    Alcohol use: Not Currently    Drug use: Defer    Sexual activity: Defer     Social Drivers of Health     Financial Resource Strain: Patient Unable To Answer (3/15/2025)    Overall Financial Resource Strain (CARDIA)     Difficulty of Paying Living Expenses: Patient unable to answer   Food Insecurity: Unknown (12/7/2024)    Received from [employer]    Hunger Vital Sign     Worried About Running Out of Food in the Last Year: Never true   Transportation Needs: Patient Unable To Answer (3/15/2025)    PRAPARE - Transportation     Lack of Transportation (Medical): Patient unable to answer     Lack of Transportation (Non-Medical): Patient unable to answer   Physical Activity: Patient Unable To Answer (6/1/2024)    Exercise Vital Sign     Days of Exercise per Week: Patient unable to answer     Minutes of Exercise per Session: Patient unable to answer   Stress: Patient Unable To Answer (6/1/2024)    [employer] - Occupational Stress Questionnaire     Feeling of Stress : Patient unable to answer   Social Connections: Patient Unable To Answer (6/1/2024)    Social Connection and Isolation Panel [NHANES]      Frequency of Communication with Friends and Family: Patient unable to answer     Frequency of Social Gatherings with Friends and Family: Patient unable to answer     Attends Jehovah's witness Services: Patient unable to answer     Active Member of Clubs or Organizations: Patient unable to answer     Attends Club or Organization Meetings: Patient unable to answer     Marital Status: Patient unable to answer   Intimate Partner Violence: Unknown (12/7/2024)    Received from [facility]    Humiliation, Afraid, Rape, and Kick questionnaire     Emotionally Abused: No   Housing Stability: Patient Unable To Answer (3/15/2025)    Housing Stability Vital Sign     Unable to Pay for Housing in the Last Year: Patient unable to answer     Homeless in the Last Year: Patient unable to answer       PHYSICAL EXAM   VS: As documented in the triage note and EMR flowsheet from this visit were reviewed.    GEN: NAD, nontoxic, chronically ill-appearing, resting comfortably in ED cart without difficulty or dyspnea  EYES:  EOMs grossly intact, anicteric sclera, no nystagmus noted, clear and equal bilaterally, no foreign body noted  HEENT: Airway patent  CARD: Tachycardic rate, regular rhythm, nontender chest, no crepitus deformities, no JVD, no murmurs rubs or gallops ; No edema noted.  Positive pulses bilaterally throughout.  Capillary refill less than 3 seconds.  No abnormal redness, warmth, tenderness or swelling noted to bilateral lower extremities.  PULMONARY: Clear all lung fields. Moving air well, Nonlabored, no accessory muscle use, tachypnea noted  ABDOMEN: Abdomen soft, non-distended, no rebound, no guarding. Bowel sounds normal in all 4 quadrants. No grimace to palpation.  No CVA tenderness.  No masses or organomegaly noted.  No evidence of peritonitis. Negative Judge's and McBurney's point tenderness.    : Iqbal catheter in place with gross bloody drainage.  MUSK: Spine appears normal, range of motion is not limited, no muscle or  joint tenderness. Strength 4 out of 5 equal bilaterally throughout.  Generalized weakness noted.  No step-offs, deformities or additional signs of trauma noted.  No spinal/midline tenderness to palpation  SKIN: Skin normal color for race, warm, dry and intact. No evidence of trauma. No rash noted.  NEURO: Drowsy and oriented x 1 at baseline, mild dysarthria noted, no obvious deficits noted. No facial droop noted.  GCS 14, NIH 8 but unreliable  LYMPH: No adenopathy or splenomegaly. No cervical, supraclavicular or inguinal lymphadenopathy.    DIAGNOSTIC RESULTS   RADIOLOGY:   Non-plain film images such as CT, Ultrasound and MRI are read by the radiologist. Plain radiographic images are visualized and preliminarily interpreted by myself with the below findings: CT head which revealed no acute intracranial process.  Chest x-ray with no acute cardiopulmonary process      Interpretation per the Radiologist below, if available at the time of this note:    CT chest abdomen pelvis wo IV contrast   Final Result   Chest   1.  No definite acute thoracic findings within limits of this exam.        Abdomen-Pelvis   1.  Considerable circumferential bladder wall thickening with   infiltration of the adjacent fat this is less pronounced than the   prior exam. There is associated bilateral hydro ureteral nephrosis.   However this is very mild and is notably improved from the prior exam.             MACRO:   None        Signed by: Joseph Schoenberger 3/15/2025 9:46 AM   Dictation workstation:   [id number]      XR chest 1 view   Final Result   1.  No evidence of acute cardiopulmonary process.                  MACRO:   None        Signed by: Joseph Schoenberger 3/15/2025 7:46 AM   Dictation workstation:   [id number]      CT brain attack head wo IV contrast   Final Result   No evidence of acute cortical infarct or intracranial hemorrhage.        No evidence of intracranial hemorrhage or displaced skull fracture.        MACRO:    Joseph Schoenberger discussed the significance and urgency of this   critical finding by telephone with  YOVANI COURTNEYSHAJIBENJA on 3/15/2025 at   7:23 am.  (**-RCF-**) Findings:  BAT.             Signed by: Joseph Schoenberger 3/15/2025 7:23 AM   Dictation workstation:   [id number]            ED BEDSIDE ULTRASOUND:   Performed by myself - none    LABS:  Labs Reviewed   CBC WITH AUTO DIFFERENTIAL - Abnormal       Result Value    WBC 0.7 (*)     nRBC 0.0      RBC 3.71 (*)     Hemoglobin 11.4 (*)     Hematocrit 35.0 (*)     MCV 94      MCH 30.7      MCHC 32.6      RDW 14.2      Platelets 106 (*)     Immature Granulocytes %, Automated 0.0      Immature Granulocytes Absolute, Automated 0.00      Narrative:     The previously reported component Neutrophils % is no longer being reported.  The previously reported component Lymphocytes % is no longer being reported.  The previously reported component Monocytes % is no longer being reported.  The previously   reported component Eosinophils % is no longer being reported.  The previously reported component Basophils % is no longer being reported.  The previously reported component Absolute Neutrophils is no longer being reported.  The previously reported   component Absolute Lymphocytes is no longer being reported.  The previously reported component Absolute Monocytes is no longer being reported.  The previously reported component Absolute Eosinophils is no longer being reported.  The previously reported   component Absolute Basophils is no longer being reported.   COMPREHENSIVE METABOLIC PANEL - Abnormal    Glucose 116 (*)     Sodium 136      Potassium 3.8      Chloride 103      Bicarbonate 17 (*)     Anion Gap 20      Urea Nitrogen 50 (*)     Creatinine 3.52 (*)     eGFR 17 (*)     Calcium 8.9      Albumin 3.5      Alkaline Phosphatase 54      Total Protein 6.3 (*)     AST 19      Bilirubin, Total 1.3 (*)     ALT 10     TROPONIN I, HIGH SENSITIVITY - Abnormal    Troponin I,  High Sensitivity 57 (*)     Narrative:     Less than 99th percentile of normal range cutoff-  Female and children under 18 years old <14 ng/L; Male <21 ng/L: Negative  Repeat testing should be performed if clinically indicated.     Female and children under 18 years old 14-50 ng/L; Male 21-50 ng/L:  Consistent with possible cardiac damage and possible increased clinical   risk. Serial measurements may help to assess extent of myocardial damage.     >50 ng/L: Consistent with cardiac damage, increased clinical risk and  myocardial infarction. Serial measurements may help assess extent of   myocardial damage.      NOTE: Children less than 1 year old may have higher baseline troponin   levels and results should be interpreted in conjunction with the overall   clinical context.     NOTE: Troponin I testing is performed using a different   testing methodology at [facility] than at other   [facility]. Direct result comparisons should only   be made within the same method.   PROTIME-INR - Abnormal    Protime 17.3 (*)     INR 1.6 (*)    LIPASE - Abnormal    Lipase 5 (*)     Narrative:     Venipuncture immediately after or during the administration of Metamizole may lead to falsely low results. Testing should be performed immediately prior to Metamizole dosing.   LACTATE - Abnormal    Lactate 3.3 (*)     Narrative:     Venipuncture immediately after or during the administration of Metamizole may lead to falsely low results. Testing should be performed immediately prior to Metamizole dosing.   BLOOD GAS VENOUS FULL PANEL - Abnormal    POCT pH, Venous 7.46 (*)     POCT pCO2, Venous 28 (*)     POCT pO2, Venous 55 (*)     POCT SO2, Venous 88 (*)     POCT Oxy Hemoglobin, Venous 85.8 (*)     POCT Hematocrit Calculated, Venous 39.0 (*)     POCT Sodium, Venous 134 (*)     POCT Potassium, Venous 4.0      POCT Chloride, Venous 104      POCT Ionized Calicum, Venous 1.20      POCT Glucose, Venous 102 (*)     POCT  Lactate, Venous 3.8 (*)     POCT Base Excess, Venous -2.7 (*)     POCT HCO3 Calculated, Venous 19.9 (*)     POCT Hemoglobin, Venous 13.0 (*)     POCT Anion Gap, Venous 14.0      Patient Temperature 37.0      FiO2 28     URINALYSIS WITH REFLEX CULTURE AND MICROSCOPIC - Abnormal    Color, Urine Red (*)     Appearance, Urine Turbid (*)     Specific Gravity, Urine 1.025      pH, Urine 6.0      Protein, Urine 300 (3+) (*)     Glucose, Urine NEGATIVE      Blood, Urine 1.0 (3+) (*)     Ketones, Urine NEGATIVE      Bilirubin, Urine NEGATIVE      Urobilinogen, Urine NORM      Nitrite, Urine NEGATIVE      Leukocyte Esterase, Urine 500 Genny/uL (*)    LACTATE - Abnormal    Lactate 2.4 (*)     Narrative:     Venipuncture immediately after or during the administration of Metamizole may lead to falsely low results. Testing should be performed immediately prior to Metamizole dosing.   B-TYPE NATRIURETIC PEPTIDE - Abnormal     (*)     Narrative:        <100 pg/mL - Heart failure unlikely  100-299 pg/mL - Intermediate probability of acute heart                  failure exacerbation. Correlate with clinical                  context and patient history.    >=300 pg/mL - Heart Failure likely. Correlate with clinical                  context and patient history.    BNP testing is performed using different testing methodology at [facility] than at other Health system hospitals. Direct result comparisons should only be made within the same method.      CBC WITH AUTO DIFFERENTIAL - Abnormal    WBC 3.6 (*)     nRBC 0.0      RBC 3.12 (*)     Hemoglobin 9.7 (*)     Hematocrit 29.1 (*)     MCV 93      MCH 31.1      MCHC 33.3      RDW 14.3      Platelets 113 (*)     Immature Granulocytes %, Automated 0.3      Immature Granulocytes Absolute, Automated 0.01      Narrative:     The previously reported component Neutrophils % is no longer being reported.  The previously reported component Lymphocytes % is no longer being reported.  The  previously reported component Monocytes % is no longer being reported.  The previously   reported component Eosinophils % is no longer being reported.  The previously reported component Basophils % is no longer being reported.  The previously reported component Absolute Neutrophils is no longer being reported.  The previously reported   component Absolute Lymphocytes is no longer being reported.  The previously reported component Absolute Monocytes is no longer being reported.  The previously reported component Absolute Eosinophils is no longer being reported.  The previously reported   component Absolute Basophils is no longer being reported.   MICROSCOPIC ONLY, URINE - Abnormal    WBC, Urine >50 (*)     WBC Clumps, Urine FEW      RBC, Urine >20 (*)     Bacteria, Urine 4+ (*)    TROPONIN I, HIGH SENSITIVITY - Abnormal    Troponin I, High Sensitivity 71 (*)     Narrative:     Less than 99th percentile of normal range cutoff-  Female and children under 18 years old <14 ng/L; Male <21 ng/L: Negative  Repeat testing should be performed if clinically indicated.     Female and children under 18 years old 14-50 ng/L; Male 21-50 ng/L:  Consistent with possible cardiac damage and possible increased clinical   risk. Serial measurements may help to assess extent of myocardial damage.     >50 ng/L: Consistent with cardiac damage, increased clinical risk and  myocardial infarction. Serial measurements may help assess extent of   myocardial damage.      NOTE: Children less than 1 year old may have higher baseline troponin   levels and results should be interpreted in conjunction with the overall   clinical context.     NOTE: Troponin I testing is performed using a different   testing methodology at [facility] than at other   [facility] hospitals. Direct result comparisons should only   be made within the same method.   POCT GLUCOSE - Abnormal    POCT Glucose 114 (*)    MANUAL DIFFERENTIAL - Abnormal    Neutrophils %,  Manual 54.0      Bands %, Manual 27.0      Lymphocytes %, Manual 17.0      Monocytes %, Manual 1.0      Eosinophils %, Manual 0.0      Basophils %, Manual 0.0      Metamyelocytes %, Manual 1.0      Seg Neutrophils Absolute, Manual 0.38 (*)     Bands Absolute, Manual 0.19      Lymphocytes Absolute, Manual 0.12 (*)     Monocytes Absolute, Manual 0.01 (*)     Eosinophils Absolute, Manual 0.00      Basophils Absolute, Manual 0.00      Metamyelocytes Absolute, Manual 0.01      Total Cells Counted 100      Neutrophils Absolute, Manual 0.57 (*)     RBC Morphology See Below      Polychromasia Mild      RBC Fragments Few      Ovalocytes Few      Teardrop Cells Few      Acanthocytes Few      Giant Platelets Few     MANUAL DIFFERENTIAL - Abnormal    Neutrophils %, Manual 44.0      Bands %, Manual 47.0      Lymphocytes %, Manual 2.0      Monocytes %, Manual 4.0      Eosinophils %, Manual 0.0      Basophils %, Manual 0.0      Metamyelocytes %, Manual 3.0      Seg Neutrophils Absolute, Manual 1.58 (*)     Bands Absolute, Manual 1.69 (*)     Lymphocytes Absolute, Manual 0.07 (*)     Monocytes Absolute, Manual 0.14      Eosinophils Absolute, Manual 0.00      Basophils Absolute, Manual 0.00      Metamyelocytes Absolute, Manual 0.11      Total Cells Counted 100      Neutrophils Absolute, Manual 3.27      RBC Morphology See Below      Polychromasia Mild      Ovalocytes Few      Teardrop Cells Few      Vacuolated Neutrophils Present      Clumped Platelets Present     MRSA SURVEILLANCE FOR VANCOMYCIN DE-ESCALATION, PCR - Normal    MRSA PCR Not Detected      Narrative:     This assay is an FDA-approved in vitro diagnostic nucleic acid amplification test for the detection of methicillin-resistant Staphylococcus aureus (MRSA) DNA directly from nasal swabs in patients at risk for nasal colonization. MRSA NxG is intended to aid in the prevention and control of MRSA infections in healthcare settings. This assay is NOT intended to diagnose,  guide, or monitor treatment for MRSA infections, or provide results of susceptibility to methicillin. A negative result does not preclude MRSA nasal colonization. Test performance has not been evaluated in patients less than two years of age.   APTT - Normal    aPTT 28      Narrative:     The APTT is no longer used for monitoring Unfractionated Heparin Therapy. For monitoring Heparin Therapy, use the Heparin Assay.   INFLUENZA A AND B PCR - Normal    Flu A Result Not Detected      Flu B Result Not Detected      Narrative:     This assay is an in vitro diagnostic multiplex nucleic acid amplification test for the detection and discrimination of Influenza A & B from nasopharyngeal specimens, and has been validated for use at [facility]. Negative results do not preclude Influenza A/B infections, and should not be used as the sole basis for diagnosis, treatment, or other management decisions. If Influenza A/B and RSV PCR results are negative, testing for Parainfluenza virus, Adenovirus and Metapneumovirus is routinely performed for [facility] pediatric oncology and intensive care inpatients, and is available on other patients by placing an add-on request.   SARS-COV-2 PCR - Normal    Coronavirus 2019, PCR Not Detected      Narrative:     This assay is an FDA-cleared, in vitro diagnostic nucleic acid amplification test for the qualitative detection and differentiation of SARS CoV-2 from nasopharyngeal specimens collected from individuals with signs and symptoms of respiratory tract infections, and has been validated for use at [facility]. Negative results do not preclude COVID-19 infections and should not be used as the sole basis for diagnosis, treatment, or other management decisions. Testing for SARS CoV-2 is recommended only for patients who meet current clinical and/or epidemiological criteria defined by federal, state, or local public health directives.   BLOOD CULTURE    BLOOD CULTURE   URINE CULTURE   TYPE AND SCREEN    ABO TYPE O      Rh TYPE POS      ANTIBODY SCREEN NEG     URINALYSIS WITH REFLEX CULTURE AND MICROSCOPIC    Narrative:     The following orders were created for panel order Urinalysis with Reflex Culture and Microscopic.  Procedure                               Abnormality         Status                     ---------                               -----------         ------                     Urinalysis with Reflex C...[[id number]]  Abnormal            Final result               Extra Urine Gray Tube[[id number]]                            In process                   Please view results for these tests on the individual orders.   EXTRA URINE GRAY TUBE   BLOOD GAS LACTIC ACID, VENOUS   PREPARE RBC    PRODUCT CODE T3427K56      Unit Number [id number]      Unit ABO O      Unit RH POS      XM INTEP COMP      Dispense Status TR      Blood Expiration Date 4/17/2025 11:59:00 PM EDT      PRODUCT BLOOD TYPE 5100      UNIT VOLUME 284     PREPARE PLASMA    PRODUCT CODE T4725D98      Unit Number [id number]      Unit ABO O      Unit RH POS      Dispense Status XM      Blood Expiration Date 3/20/2025 12:14:00 PM EDT      PRODUCT BLOOD TYPE 5100      UNIT VOLUME 216     POCT GLUCOSE METER       All other labs were within normal range or not returned as of this dictation.    EMERGENCY DEPARTMENT COURSE/MDM:   Vitals:    Vitals:    03/15/25 1205 03/15/25 1210 03/15/25 1219 03/15/25 1300   BP: 88/50 95/52 95/58 104/56   BP Location:       Patient Position:       Pulse: 94 92 93 95   Resp: (!) 31 (!) 31 (!) 32 (!) 31   Temp:   37.2 °C (99 °F)    TempSrc:   Temporal    SpO2: 97% 96% 97% 94%   Weight:       Height:           I reviewed the patient's triage vitals.    This is a 79-year-old male presents ED for evaluation of possible stroke/altered mental status/shaking prior to arrival at the nursing home.  He arrives without any focal neurodeficits and is at his baseline  neurological function, GCS 14, mildly drowsy but does awaken with verbal stimuli.  Airway is intact but he is tachypneic and on supplemental oxygenation of 2 L nasal cannula.  Unsure if he was hypoxic for the squad.  Mentating appropriately and is following commands.  NIH is unreliable due to his past medical history.  He is taken immediately over to CT scan received a call from radiology regarding results, no evidence of ischemic hemorrhagic stroke, just age-related changes.  I have low suspicion that patient is having a CVA and I believe this is more metabolic in nature.  Additional NIH but I believe this is unreliable due to his baseline dementia.  He has got no focal deficits.  He is initiated on sepsis protocol, only 1 L of IV fluid was ordered at this time due to patient's significant cardiac history/heart failure history with a significantly low EF per chart review.  Unsure of the exact numbers I cannot find this at this time.  Initial blood pressures were normal.    Once patient was back in the emergency department room and placed on continuous cardiac monitoring.  He was noted to be saturating at 92% on 2 L nasal cannula and I did increase him to 4 L nasal cannula with improvement to 94%.  No known history of COPD, unsure if he wears oxygen at baseline.  He does have a Iqbal catheter in place with gross bloody drainage.    Workup was reviewed.  Initial troponin 57, elevated to 71 on redraw which is likely a type II NSTEMI in the setting of sepsis, no indication for heparin drip at this time additionally patient is on Eliquis this noted to have gross hematuria.  Initial lactate was 3.3, down trended to 0.4 on recheck.  Additionally patient has an STUART.  Initial leukocytosis 0.7 which I redrew and was 3.6 as his one from 2 days ago was 14.1 and I had suspicion that this was false.  He does have a 2 point drop in his hemoglobin likely in setting of his hematuria.  Urinalysis consistent with UTI and he was  already initiated on broad-spectrum IV antibiotics for sepsis coverage upon arrival.    Imaging was consistent with circumstantial bladder wall thickening and associated bilateral hydroureteronephrosis which is improved from prior exam.    .I performed a sepsis reperfusion exam on Cheko Jin on 3/15/2025 @0950    A targeted fluid bolus of one liter IV fluid was given, if adult patient did not receive a 30cc/kg fluid bolus, the clinical rationale is concern for significant fluid overload.  Upon reevaluation after this liter patient remained with hypotension the 60s/70s systolic and tachycardic therefore due to concern for impending fluid overload I did initiate the patient on Levophed drip for additional support.  He was additionally ordered 1 unit PRBCs and FFP for hemorrhagic cystitis.  Did consider Kcentra at this time but will hold off and see if blood products improved patient's hemodynamics.  Patient did have proper IV access, no indication for central line placement at this time.    I did speak with urology regarding the patient's hemorrhagic cystitis who stated they will evaluate the patient in the ICU, likely needs a cystogram but cannot do this while he is unstable.  I also had the nursing staff change the patient's Iqbal catheter over to three-way with CBI for further management.    He had improved hemodynamics with PRBC administration and FFP and remained otherwise he medically stable throughout my ED course of care.  He did have improved mentation with of his blood pressures.  He remained mildly tachypneic but no longer hypoxic on his new oxygen requirement of 4 L nasal cannula.  There is no indication for intubation at this time.    He was ultimately admitted to the medical ICU for further management.    Henna Barbosa, APRN-CNP       Diagnoses as of 03/15/25 1315   STUART (acute kidney injury) (CMS-[facility])   Generalized weakness   Altered mental status, unspecified altered mental status type    Hypotension, unspecified hypotension type   Elevated troponin   Septic shock (Multi)   Hemorrhagic shock (Multi)   Urinary tract infection, bacterial   Gross hematuria   Acute respiratory failure with hypoxia (Multi)       Of note patient's I did speak with both the patient's POA's.  Initial POA Ophelia there was a concern regarding the patient's CODE STATUS, he does have DNR CCA signed paperwork at bedside but per POA discussion they state that he is a DNR CC patient would not want any extensive life-saving measures.  I educated him that his paperwork says CCA at this time, they resent another copy which confirmed CCA but at this time patient is already in the ICU and I directed them to speak with the ICU team regarding CODE STATUS going forward and if they would like to make the patient DNR CC.    ------------------------------------------------------------------  EKG Interpretation: Cardiac rate 119, , QRS 88, QTc 385 without evidence of STEMI or ischemia    Differential Diagnoses Considered: Ischemic stroke, hemorrhagic stroke, electrolyte abnormality, sepsis, dehydration, infection, anemia, ACS, NSTEMI, encephalopathy, hemorrhagic shock, septic shock, kidney dysfunction    Chronic Medical Conditions Significantly Affecting Care: Dementia    External Records Reviewed: I reviewed recent and relevant outside records including: PCP notes, prior discharge summary, previous radiologic studies, HIE, EMS runsheet    Escalation of Care:  Appropriate for admission to the medical ICU for further management, Dr. Cueto    Social Determinants of Health Significantly Affecting Care:  Lacking transportation    Prescription Drug Consideration: N/A    Diagnostic testing considered: no additional indicated at this time    Discussion of Management with Other Providers:   I discussed the patient/results with: Admitting team, urology      ------------------------------------------------------------------  ED Medications  administered this visit:    Medications   norepinephrine (Levophed) 8 mg in sodium chloride 0.9% 250 mL (0.032 mg/mL) infusion (premix) (0.05 mcg/kg/min × 73 kg intravenous Rate/Dose Change 3/15/25 1040)   sodium chloride 0.9 % bolus 500 mL (has no administration in time range)   piperacillin-tazobactam (Zosyn) 2.25 g in dextrose (iso) IV 50 mL (has no administration in time range)   amiodarone (Pacerone) tablet 200 mg (has no administration in time range)   apixaban (Eliquis) tablet 5 mg ( oral Dose Auto Held 3/19/25 2100)   atorvastatin (Lipitor) tablet 40 mg (has no administration in time range)   aspirin EC tablet 81 mg ( oral Dose Auto Held 3/19/25 0900)   divalproex (Depakote) delayed release tablet 250 mg (has no administration in time range)   finasteride (Proscar) tablet 5 mg (has no administration in time range)   metoprolol succinate XL (Toprol-XL) 24 hr tablet 25 mg ( oral Dose Auto Held 3/19/25 0900)   rivastigmine (Exelon) 4.6 mg/24 hour 1 patch (has no administration in time range)   tamsulosin (Flomax) 24 hr capsule 0.4 mg ( oral Dose Auto Held 3/19/25 2100)   sodium chloride 0.9 % bolus 1,000 mL (0 mL intravenous Stopped 3/15/25 0933)   piperacillin-tazobactam (Zosyn) 3.375 g in dextrose (iso) IV 50 mL (0 g intravenous Stopped 3/15/25 0920)   vancomycin 1.5 g in NS  mL (0 g intravenous Stopped 3/15/25 1242)       New Prescriptions from this visit:    Current Discharge Medication List          Follow-up:  No follow-up provider specified.      Final Impression:   1. STUART (acute kidney injury) (CMS-HCC)    2. Generalized weakness    3. Altered mental status, unspecified altered mental status type    4. Hypotension, unspecified hypotension type    5. Elevated troponin    6. Septic shock (Multi)    7. Hemorrhagic shock (Multi)    8. Urinary tract infection, bacterial    9. Gross hematuria    10. Acute respiratory failure with hypoxia (Multi)          Henna Barbosa, APRN-CNP    This patient was  staffed with ED Attending Dr. Tillman to review the plan of care during ED course.    (Please note that portions of this note were completed with a voice recognition program.  Efforts were made to edit the dictations but occasionally words are mis-transcribed.)     Henna Barbosa, PATIENCE-CNP  03/15/25 1642

## 2025-03-15 NOTE — ED PROCEDURE NOTE
Procedure  Critical Care    Performed by: KEVIN Navarro  Authorized by: Darrian Tillman DO    Critical care provider statement:     Critical care time (minutes):  60    Critical care time was exclusive of:  Separately billable procedures and treating other patients and teaching time    Critical care was necessary to treat or prevent imminent or life-threatening deterioration of the following conditions:  Respiratory failure, sepsis, circulatory failure, renal failure, CNS failure or compromise, dehydration, shock and metabolic crisis    Critical care was time spent personally by me on the following activities:  Blood draw for specimens, ordering and performing treatments and interventions, development of treatment plan with patient or surrogate, ordering and review of laboratory studies, discussions with consultants, ordering and review of radiographic studies, discussions with primary provider, pulse oximetry, evaluation of patient's response to treatment, re-evaluation of patient's condition, examination of patient, review of old charts and obtaining history from patient or surrogate    Care discussed with: admitting provider                 KEVIN Navarro  03/15/25 6252

## 2025-03-15 NOTE — H&P
SUBJECTIVE     HPI:  Cheko Jin is a 79 y.o. 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 History from patient was limited due to advanced dementia. Patient comes from [facility].  Patient was recently seen in the ED after a fall, workup was negative at that time.    In the ED: Patient was tachycardic and tachypneic.  Patient began to be hypotensive and was started on pressors.  He is currently satting at 95% on supplemental oxygen.  CT brain attack shows no acute process, CT chest abdomen pelvis significant for considerable circumferential bladder wall thickening with infiltration of adjacent fat, bilateral hydronephrosis, less pronounced than prior exam.  Patient was started on vancomycin and Zosyn, given 1 L NS bolus, Levophed 0.05 mcg, 1 unit platelet and PRBC transfusion.  Urology was consulted.    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    Past Medical History:  Past Medical History:   Diagnosis Date    Alzheimer's dementia (Multi)     Atrial flutter (Multi)     CAD (coronary artery disease)     nonobstructive    CKD (chronic kidney disease)     Dilated cardiomyopathy (Multi)     Gout     HFrEF (heart failure with reduced ejection fraction) (Multi)     Hypertension      Malignant neoplasm of bladder, unspecified (Multi)     NSVT (nonsustained ventricular tachycardia) (Multi)     Lifevest placed    Sleep apnea     Stroke (cerebrum) (Multi)     3 strokes R frontal, parietal lobes (July, 2010, September, 2010 and October, 2010), etiology embolic due to 40-50% ulcerated R ICA plaque s/p R CEA 10/12/10       Past Surgical History:  Past Surgical History:   Procedure Laterality Date    HERNIA REPAIR Bilateral     TRANSURETHRAL RESECTION OF PROSTATE          Family History:  Family History   Problem Relation Name Age of Onset    No Known Problems Mother      No Known Problems Father          Social History:   reports that he has never smoked. He has never been exposed to tobacco smoke. He has never used smokeless tobacco. He reports that he does not currently use alcohol. Drug use questions deferred to the physician.    OBJECTIVE     Vitals:    03/15/25 1005 03/15/25 1010 03/15/25 1033 03/15/25 1048   BP: 82/50 88/53 89/55 86/53   BP Location:       Patient Position:       Pulse: 96 97 98 97   Resp: (!) 32 (!) 31 (!) 34 (!) 32   Temp:   36.9 °C (98.4 °F) 37.2 °C (99 °F)   TempSrc:   Temporal Temporal   SpO2: 98% 98% 99% 98%   Weight:       Height:          Results from last 7 days   Lab Units 03/15/25  0839 03/15/25  0722 03/13/25  1832   WBC AUTO x10*3/uL 3.6*   < > 14.1*   HEMOGLOBIN g/dL 9.7*   < > 11.8*   HEMATOCRIT % 29.1*   < > 36.1*   PLATELETS AUTO x10*3/uL 113*   < > 175   NEUTROS PCT AUTO %  --   --  92.4   LYMPHO PCT MAN % 2.0   < >  --    LYMPHS PCT AUTO %  --   --  1.4   MONO PCT MAN % 4.0   < >  --    MONOS PCT AUTO %  --   --  5.5   EOSINO PCT MAN % 0.0   < >  --    EOS PCT AUTO %  --   --  0.0    < > = values in this interval not displayed.     Results from last 7 days   Lab Units 03/15/25  0722   SODIUM mmol/L 136   POTASSIUM mmol/L 3.8   CHLORIDE mmol/L 103   CO2 mmol/L 17*   BUN mg/dL 50*   CREATININE mg/dL 3.52*   CALCIUM mg/dL 8.9   PROTEIN TOTAL g/dL 6.3*   BILIRUBIN  TOTAL mg/dL 1.3*   ALK PHOS U/L 54   ALT U/L 10   AST U/L 19   GLUCOSE mg/dL 116*     24hr Min/Max:  Temp  Min: 36.9 °C (98.4 °F)  Max: 37.6 °C (99.7 °F)  Pulse  Min: 73  Max: 131  BP  Min: 63/39  Max: 183/99  Resp  Min: 15  Max: 57  SpO2  Min: 60 %  Max: 99 %  LDA:   Urethral Catheter Coude 14 Fr. (Active)   Placement Date/Time: 03/15/25 0856   Hand Hygiene Completed: Yes  Catheter Type: Coude  Tube Size (Fr.): 14 Fr.  Catheter Balloon Size: 10 mL  Urine Returned: Yes   Number of days: 0     Vent settings:     Hemodynamic parameters for last 24 hours:       No intake or output data in the 24 hours ending 03/15/25 1122  All other labs and Imaging have been personally reviewed.     Scheduled Medications  vancomycin, 1.5 g, intravenous, Once       Continuous Medications:   norepinephrine, 0-0.2 mcg/kg/min, Last Rate: 0.05 mcg/kg/min (03/15/25 1040)      Physical Exam:  General:  Pleasant and cooperative. No apparent distress.  HEENT:  Normocephalic, atraumatic  Chest:  Clear to auscultation bilaterally. No wheezes, rales, or rhonchi.  CV: Irregular rate and rhythm, no murmurs  Abdomen: Abdomen is soft, non-tender, non-distended. BS +   Extremities:  No lower extremity edema or cyanosis.   Neurological:  AAOx1. No focal deficits.  Skin:  Warm and dry.    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% 8/2024 echo  #History of carotid stenosis s/p right CEA  PLAN:  -No clinical signs of decompensated heart failure  -Patient currently on Levophed, 0.03 mcg  -Continue home dose amiodarone  -Hold home dose  metoprolol and Eliquis  -Trend lactate  -Maintain MAPs>65     Pulmonary  #Respiratory Alkalosis  #Tachypnea  #GOMEZ on CPAP  PLAN:  -Likely secondary to septic shock, will continue to treat underlying infection and monitor    GI  #Hyperlipidemia  PLAN:  -Continue home atorvastatin    Renal  #STUART on CKD stage III  #Gross Hematuria  #Complicated UTI  #Mild bilateral ureteral hydronephrosis  #History of bladder cancer  -UA significant for greater than 50 WBC, 4+ bacteria  PLAN:  -Likely prerenal cause of STUART in setting of sepsis, patient is s/p 1.5 L bolus, will continue to monitor  -Hold home dose Flomax  -Urology consulted    Endocrine  #No acute issues  PLAN:  -Will continue to monitor    Heme/Onc  #Acute leukopenia  #Acute blood loss anemia in setting of chronic anemia, low suspicion for hemorrhagic shock  PLAN:  -Patient has received 1 unit of platelets and 1 unit of PRBCs in ED  -Suspect leukopenia secondary to sepsis, patient does not have a history of leukopenia  -Hgb appears to be downtrending we will continue to monitor    ID  #Septic shock  PLAN:  -Vancomycin and Zosyn in the ED, will DC vancomycin and continue renally dosed Zosyn  -Pending blood cultures and urine cultures    Skin/MSK  #Gout  #Healing laceration on forehead from previous fall  PLAN:  -Site of laceration does not appear infected  -Hold home dose allopurinol in setting of STUART    ICU CHECK LIST  Antimicrobials: Zosyn  Oxygen: 2L NC  Feeding: N.p.o.,  Drips: Levo  Fluids: 1.5L NS bolus total  Analgesia: -   Sedation: -  VTE ppx: SCDs  GI ppx: Protonix daily  Glycemic control:-  Bowel care:-  Indwelling catheters: Urology following, plan to place three-way Iqbal  Lines: -  Code Status: DNR YESSY Alonso  PGY-1, Internal Medicine  Please SecureChat for any further questions  This is a preliminary note, please await attending attestation for final A/P

## 2025-03-15 NOTE — ED TRIAGE NOTES
Pt presents to department via EMS from [facility] for increased weakkness. Pt A&Ox1 at baseline. Stoke alert called upon arrival to department, immediately to CT. VSS

## 2025-03-15 NOTE — ED PROVIDER NOTES
HPI   Chief Complaint   Patient presents with    Fall     PT. BIBA FROM [facility] S/P UNWITNESSED FALL. PER EMS, PT. FELL TWICE TODAY. PT. SEEN IN ED LAST NIGHT FOR FALL ALSO. PT. ON ELIQUIS. PT. ARRIVED WITH 3-WAY LEDEZMA IN PLACE WITH NO PLUG TO ONE DRAINAGE TUBE. BLOODY URINE NOTED. PER EMS, STAFF STATES PT. WITH HEMATURIA OFF AND ON. PT. STATES DOES NOT REMEMBER FALLING, DENIES PAIN.        Patient is a 79-year-old male past medical history as below presents today for evaluation of injuries from a fall.  Patient had 2 unwitnessed falls today.  He is on a blood thinner.  Patient himself has no complaints.  Patient was seen yesterday for same thing had laceration repaired labs checked return to facility.              Patient History   Past Medical History:   Diagnosis Date    Alzheimer's dementia (Multi)     Atrial flutter (Multi)     CAD (coronary artery disease)     nonobstructive    CKD (chronic kidney disease)     Dilated cardiomyopathy (Multi)     Gout     HFrEF (heart failure with reduced ejection fraction) (Multi)     Hypertension     Malignant neoplasm of bladder, unspecified (Multi)     NSVT (nonsustained ventricular tachycardia) (Multi)     Lifevest placed    Sleep apnea     Stroke (cerebrum) (Multi)     3 strokes R frontal, parietal lobes (July, 2010, September, 2010 and October, 2010), etiology embolic due to 40-50% ulcerated R ICA plaque s/p R CEA 10/12/10     Past Surgical History:   Procedure Laterality Date    HERNIA REPAIR Bilateral     TRANSURETHRAL RESECTION OF PROSTATE       Family History   Problem Relation Name Age of Onset    No Known Problems Mother      No Known Problems Father       Social History     Tobacco Use    Smoking status: Never     Passive exposure: Never    Smokeless tobacco: Never   Vaping Use    Vaping status: Never Used   Substance Use Topics    Alcohol use: Not Currently    Drug use: Defer       Physical Exam   ED Triage Vitals   Temperature Heart Rate Respirations BP    03/14/25 1350 03/14/25 1348 03/14/25 1349 03/14/25 1347   37.2 °C (99 °F) 76 (!) 30 159/74      Pulse Ox Temp Source Heart Rate Source Patient Position   03/14/25 1348 03/14/25 1350 03/14/25 1350 03/14/25 1350   95 % Temporal Monitor Lying      BP Location FiO2 (%)     03/14/25 1350 --     Right arm        Physical Exam  Vitals and nursing note reviewed.   Constitutional:       Appearance: Normal appearance.   HENT:      Head: Normocephalic.      Comments: Repaired laceration appears clean dry intact.     Right Ear: External ear normal.      Left Ear: External ear normal.      Nose: Nose normal.      Mouth/Throat:      Mouth: Mucous membranes are moist.   Cardiovascular:      Rate and Rhythm: Normal rate and regular rhythm.      Pulses: Normal pulses.      Heart sounds: Normal heart sounds.   Pulmonary:      Effort: Pulmonary effort is normal.      Breath sounds: Normal breath sounds.   Abdominal:      General: Abdomen is flat. There is no distension.      Palpations: Abdomen is soft.      Tenderness: There is no abdominal tenderness. There is no guarding or rebound.   Musculoskeletal:         General: No deformity.      Comments: No tenderness throughout palpation   Skin:     General: Skin is warm.   Neurological:      General: No focal deficit present.      Mental Status: He is alert. Mental status is at baseline.           ED Course & MDM   Diagnoses as of 03/15/25 1112   Head injury, initial encounter                 No data recorded     [place] Coma Scale Score: 14 (03/14/25 1445 : Kiersten Burns RN)                           Medical Decision Making  Patient presents for evaluation of 2 unwitnessed falls.  Patient without complaints here vital signs within normal limits.  He had blood work checked yesterday so will not repeat today.  No injuries found on CT imaging or examination.  Return to facility    Amount and/or Complexity of Data Reviewed  Radiology: ordered.        Procedure  Procedures      Jhonny Santiago MD  03/15/25 2271

## 2025-03-15 NOTE — PROGRESS NOTES
Pharmacy Medication History Review    Cheko Jin is a 79 y.o. male admitted for No Principal Problem: There is no principal problem currently on the Problem List. Please update the Problem List and refresh.. Pharmacy reviewed the patient's [id number] medications and allergies for accuracy.    The list below reflectives the updated PTA list. Please review each medication in order reconciliation for additional clarification and justification.  Prior to Admission medications    Medication Sig Start Date End Date Taking? Authorizing Provider   acetaminophen (Tylenol) 325 mg tablet Take 2 tablets (650 mg) by mouth every 4 hours if needed for mild pain (1 - 3) or fever (temp greater than 38.0 C).   Yes Historical Provider, MD   amiodarone (Pacerone) 200 mg tablet Take 1 tablet (200 mg) by mouth once daily.   Yes Historical Provider, MD   apixaban (Eliquis) 5 mg tablet Take 1 tablet (5 mg) by mouth twice a day.   Yes Historical Provider, MD   aspirin 81 mg EC tablet Take 1 tablet (81 mg) by mouth once daily.   Yes Historical Provider, MD   divalproex (Depakote) 250 mg EC tablet Take 1 tablet (250 mg) by mouth twice a day.   Yes Historical Provider, MD   finasteride (Proscar) 5 mg tablet Take 1 tablet (5 mg) by mouth once daily.   Yes Historical Provider, MD   loperamide (Imodium A-D) 2 mg tablet Take 1-2 tablets (2-4 mg) by mouth 4 times a day as needed for diarrhea.   Yes Historical Provider, MD   magnesium hydroxide (Milk of Magnesia) 400 mg/5 mL suspension Take 30 mL by mouth once daily as needed for constipation.   Yes Historical Provider, MD   melatonin 3 mg tablet Take 1 tablet (3 mg) by mouth once daily at bedtime.   Yes Historical Provider, MD   metoprolol succinate XL (Toprol-XL) 25 mg 24 hr tablet Take 1 tablet (25 mg) by mouth once daily.   Yes Historical Provider, MD   rivastigmine (Exelon) 4.6 mg/24 hour Place 1 patch on the skin once daily.   Yes Historical Provider, MD   tamsulosin (Flomax) 0.4  mg 24 hr capsule Take 1 capsule (0.4 mg) by mouth once daily at bedtime.   Yes Historical Provider, MD   allopurinol (Zyloprim) 300 mg tablet Take 0.5 tablets (150 mg) by mouth once daily.  Patient not taking: Reported on 3/15/2025   no Historical Provider, MD   atorvastatin (Lipitor) 40 mg tablet Take 1 tablet (40 mg) by mouth once daily.  Patient taking differently: Take 1 tablet (40 mg) by mouth once daily at bedtime. 6/12/24 2/23/25 no Kwadwo Pack MD   cholecalciferol (Vitamin D-3) 50 MCG (2000 UT) tablet Take 1 tablet (50 mcg) by mouth once daily.  Patient not taking: Reported on 3/15/2025   no Historical Provider, MD   multivitamin with minerals tablet Take 1 tablet by mouth once daily.  Patient not taking: Reported on 3/15/2025    Historical Provider, MD        The list below reflectives the updated allergy list. Please review each documented allergy for additional clarification and justification.  Allergies  Reviewed by Shira Qiu RN on 3/15/2025        Severity Reactions Comments    Trazodone Medium Insomnia     Cetirizine Low Myalgia     Isosorbide Low Headache     Levofloxacin Low Constipation     Lisinopril Low Cough     Loratadine Low Myalgia     Statins-hmg-coa Reductase Inhibitors Low Myalgia             Below are additional concerns with the patient's PTA list.    Medication list from [facility], printed 03/15/2025 1111.    Lorrie Tilley

## 2025-03-16 LAB
ANION GAP SERPL CALC-SCNC: 17 MMOL/L (ref 10–20)
BACTERIA BLD AEROBE CULT: ABNORMAL
BACTERIA BLD AEROBE CULT: ABNORMAL
BACTERIA BLD CULT: ABNORMAL
BACTERIA BLD CULT: ABNORMAL
BACTERIA UR CULT: ABNORMAL
BUN SERPL-MCNC: 57 MG/DL (ref 6–23)
CALCIUM SERPL-MCNC: 8 MG/DL (ref 8.6–10.3)
CHLORIDE SERPL-SCNC: 106 MMOL/L (ref 98–107)
CO2 SERPL-SCNC: 18 MMOL/L (ref 21–32)
CREAT SERPL-MCNC: 3.58 MG/DL (ref 0.5–1.3)
CREAT UR-MCNC: 73.1 MG/DL (ref 20–370)
EGFRCR SERPLBLD CKD-EPI 2021: 17 ML/MIN/1.73M*2
ERYTHROCYTE [DISTWIDTH] IN BLOOD BY AUTOMATED COUNT: 14.4 % (ref 11.5–14.5)
GLUCOSE SERPL-MCNC: 127 MG/DL (ref 74–99)
GRAM STN SPEC: ABNORMAL
HCT VFR BLD AUTO: 36.6 % (ref 41–52)
HGB BLD-MCNC: 11.3 G/DL (ref 13.5–17.5)
MCH RBC QN AUTO: 30.7 PG (ref 26–34)
MCHC RBC AUTO-ENTMCNC: 30.9 G/DL (ref 32–36)
MCV RBC AUTO: 100 FL (ref 80–100)
NRBC BLD-RTO: 0 /100 WBCS (ref 0–0)
PLATELET # BLD AUTO: 99 X10*3/UL (ref 150–450)
POTASSIUM SERPL-SCNC: 3.9 MMOL/L (ref 3.5–5.3)
RBC # BLD AUTO: 3.68 X10*6/UL (ref 4.5–5.9)
SODIUM SERPL-SCNC: 137 MMOL/L (ref 136–145)
SODIUM UR-SCNC: 42 MMOL/L
SODIUM/CREAT UR-RTO: 57 MMOL/G CREAT
WBC # BLD AUTO: 14.8 X10*3/UL (ref 4.4–11.3)

## 2025-03-16 PROCEDURE — 2500000001 HC RX 250 WO HCPCS SELF ADMINISTERED DRUGS (ALT 637 FOR MEDICARE OP)

## 2025-03-16 PROCEDURE — 85027 COMPLETE CBC AUTOMATED: CPT

## 2025-03-16 PROCEDURE — 2020000001 HC ICU ROOM DAILY

## 2025-03-16 PROCEDURE — 80048 BASIC METABOLIC PNL TOTAL CA: CPT

## 2025-03-16 PROCEDURE — 99231 SBSQ HOSP IP/OBS SF/LOW 25: CPT

## 2025-03-16 PROCEDURE — 2500000004 HC RX 250 GENERAL PHARMACY W/ HCPCS (ALT 636 FOR OP/ED)

## 2025-03-16 PROCEDURE — 2500000002 HC RX 250 W HCPCS SELF ADMINISTERED DRUGS (ALT 637 FOR MEDICARE OP, ALT 636 FOR OP/ED)

## 2025-03-16 PROCEDURE — 2500000004 HC RX 250 GENERAL PHARMACY W/ HCPCS (ALT 636 FOR OP/ED): Performed by: INTERNAL MEDICINE

## 2025-03-16 PROCEDURE — 36415 COLL VENOUS BLD VENIPUNCTURE: CPT

## 2025-03-16 PROCEDURE — 82570 ASSAY OF URINE CREATININE: CPT | Performed by: INTERNAL MEDICINE

## 2025-03-16 PROCEDURE — 99232 SBSQ HOSP IP/OBS MODERATE 35: CPT

## 2025-03-16 RX ORDER — QUETIAPINE FUMARATE 25 MG/1
25 TABLET, FILM COATED ORAL ONCE
Status: COMPLETED | OUTPATIENT
Start: 2025-03-16 | End: 2025-03-16

## 2025-03-16 RX ADMIN — FINASTERIDE 5 MG: 5 TABLET, FILM COATED ORAL at 08:34

## 2025-03-16 RX ADMIN — DIVALPROEX SODIUM 250 MG: 250 TABLET, DELAYED RELEASE ORAL at 08:35

## 2025-03-16 RX ADMIN — ATORVASTATIN CALCIUM 40 MG: 40 TABLET, FILM COATED ORAL at 08:34

## 2025-03-16 RX ADMIN — AMIODARONE HYDROCHLORIDE 200 MG: 200 TABLET ORAL at 08:34

## 2025-03-16 RX ADMIN — RIVASTIGMINE 1 PATCH: 4.6 PATCH, EXTENDED RELEASE TRANSDERMAL at 08:35

## 2025-03-16 RX ADMIN — PIPERACILLIN SODIUM AND TAZOBACTAM SODIUM 2.25 G: 2; .25 INJECTION, SOLUTION INTRAVENOUS at 14:24

## 2025-03-16 RX ADMIN — QUETIAPINE FUMARATE 25 MG: 25 TABLET ORAL at 16:05

## 2025-03-16 RX ADMIN — PIPERACILLIN SODIUM AND TAZOBACTAM SODIUM 2.25 G: 2; .25 INJECTION, SOLUTION INTRAVENOUS at 21:44

## 2025-03-16 RX ADMIN — PIPERACILLIN SODIUM AND TAZOBACTAM SODIUM 2.25 G: 2; .25 INJECTION, SOLUTION INTRAVENOUS at 05:45

## 2025-03-16 RX ADMIN — ACETAMINOPHEN 650 MG: 325 TABLET, FILM COATED ORAL at 05:45

## 2025-03-16 ASSESSMENT — PAIN SCALES - PAIN ASSESSMENT IN ADVANCED DEMENTIA (PAINAD)
BODYLANGUAGE: RELAXED
CONSOLABILITY: NO NEED TO CONSOLE
TOTALSCORE: 0
TOTALSCORE: 0
FACIALEXPRESSION: SMILING OR INEXPRESSIVE
BODYLANGUAGE: RELAXED
BREATHING: NORMAL
CONSOLABILITY: NO NEED TO CONSOLE
BREATHING: NORMAL
FACIALEXPRESSION: SMILING OR INEXPRESSIVE

## 2025-03-16 ASSESSMENT — COGNITIVE AND FUNCTIONAL STATUS - GENERAL
MOVING FROM LYING ON BACK TO SITTING ON SIDE OF FLAT BED WITH BEDRAILS: A LOT
EATING MEALS: A LOT
CLIMB 3 TO 5 STEPS WITH RAILING: TOTAL
DAILY ACTIVITIY SCORE: 9
MOVING FROM LYING ON BACK TO SITTING ON SIDE OF FLAT BED WITH BEDRAILS: A LOT
TURNING FROM BACK TO SIDE WHILE IN FLAT BAD: A LOT
TURNING FROM BACK TO SIDE WHILE IN FLAT BAD: A LOT
MOBILITY SCORE: 8
DRESSING REGULAR LOWER BODY CLOTHING: TOTAL
MOVING TO AND FROM BED TO CHAIR: A LOT
DRESSING REGULAR LOWER BODY CLOTHING: A LOT
DRESSING REGULAR UPPER BODY CLOTHING: A LOT
STANDING UP FROM CHAIR USING ARMS: A LOT
DAILY ACTIVITIY SCORE: 12
HELP NEEDED FOR BATHING: TOTAL
WALKING IN HOSPITAL ROOM: TOTAL
PERSONAL GROOMING: A LOT
PERSONAL GROOMING: A LOT
CLIMB 3 TO 5 STEPS WITH RAILING: TOTAL
EATING MEALS: A LITTLE
TOILETING: A LOT
DRESSING REGULAR UPPER BODY CLOTHING: TOTAL
MOVING TO AND FROM BED TO CHAIR: TOTAL
TOILETING: TOTAL
STANDING UP FROM CHAIR USING ARMS: TOTAL
HELP NEEDED FOR BATHING: A LOT
MOBILITY SCORE: 10
WALKING IN HOSPITAL ROOM: TOTAL

## 2025-03-16 ASSESSMENT — PAIN - FUNCTIONAL ASSESSMENT
PAIN_FUNCTIONAL_ASSESSMENT: PAINAD (PAIN ASSESSMENT IN ADVANCED DEMENTIA SCALE)
PAIN_FUNCTIONAL_ASSESSMENT: 0-10
PAIN_FUNCTIONAL_ASSESSMENT: 0-10

## 2025-03-16 ASSESSMENT — PAIN SCALES - GENERAL
PAINLEVEL_OUTOF10: 0 - NO PAIN
PAINLEVEL_OUTOF10: 0 - NO PAIN

## 2025-03-16 NOTE — PROGRESS NOTES
"Cheko Jin is a 79 y.o. male on day 1 of admission presenting with STUART (acute kidney injury) (CMS-[facility]).    Subjective   No acute events overnight.        Objective     Physical Exam    Last Recorded Vitals  Blood pressure 94/70, pulse 74, temperature 36.5 °C (97.7 °F), temperature source Temporal, resp. rate (!) 28, height 1.778 m (5' 10\"), weight 60.8 kg (133 lb 15.9 oz), SpO2 99%.  Intake/Output last 3 Shifts:  I/O last 3 completed shifts:  In: 1844 (30.3 mL/kg) [P.O.:400; I.V.:54 (0.9 mL/kg); Blood:240; IV Piggyback:1150]  Out: 1140 (18.8 mL/kg) [Urine:1140 (0.5 mL/kg/hr)]  Weight: 60.8 kg     Relevant Results  Results for orders placed or performed during the hospital encounter of 03/15/25 (from the past 24 hours)   Lactate   Result Value Ref Range    Lactate 2.4 (H) 0.4 - 2.0 mmol/L   CBC   Result Value Ref Range    WBC 14.8 (H) 4.4 - 11.3 x10*3/uL    nRBC 0.0 0.0 - 0.0 /100 WBCs    RBC 3.52 (L) 4.50 - 5.90 x10*6/uL    Hemoglobin 10.8 (L) 13.5 - 17.5 g/dL    Hematocrit 34.1 (L) 41.0 - 52.0 %    MCV 97 80 - 100 fL    MCH 30.7 26.0 - 34.0 pg    MCHC 31.7 (L) 32.0 - 36.0 g/dL    RDW 14.4 11.5 - 14.5 %    Platelets 108 (L) 150 - 450 x10*3/uL   Lactate   Result Value Ref Range    Lactate 1.8 0.4 - 2.0 mmol/L   Basic metabolic panel   Result Value Ref Range    Glucose 127 (H) 74 - 99 mg/dL    Sodium 137 136 - 145 mmol/L    Potassium 3.9 3.5 - 5.3 mmol/L    Chloride 106 98 - 107 mmol/L    Bicarbonate 18 (L) 21 - 32 mmol/L    Anion Gap 17 10 - 20 mmol/L    Urea Nitrogen 57 (H) 6 - 23 mg/dL    Creatinine 3.58 (H) 0.50 - 1.30 mg/dL    eGFR 17 (L) >60 mL/min/1.73m*2    Calcium 8.0 (L) 8.6 - 10.3 mg/dL   CBC   Result Value Ref Range    WBC 14.8 (H) 4.4 - 11.3 x10*3/uL    nRBC 0.0 0.0 - 0.0 /100 WBCs    RBC 3.68 (L) 4.50 - 5.90 x10*6/uL    Hemoglobin 11.3 (L) 13.5 - 17.5 g/dL    Hematocrit 36.6 (L) 41.0 - 52.0 %     80 - 100 fL    MCH 30.7 26.0 - 34.0 pg    MCHC 30.9 (L) 32.0 - 36.0 g/dL    RDW 14.4 11.5 - 14.5 " %    Platelets 99 (L) 150 - 450 x10*3/uL   Sodium, Urine Random   Result Value Ref Range    Sodium, Urine Random 42 mmol/L    Creatinine, Urine Random 73.1 20.0 - 370.0 mg/dL    Sodium/Creatinine Ratio 57 Not established. mmol/g Creat     CT chest abdomen pelvis wo IV contrast    Result Date: 3/15/2025  Interpreted By:  Schoenberger, Joseph, STUDY: CT CHEST ABDOMEN PELVIS WO CONTRAST;  3/15/2025 9:16 am   INDICATION: Signs/Symptoms:septic.     COMPARISON: 03/13/2025   ACCESSION NUMBER(S): [id number]   ORDERING CLINICIAN: YOVANI VERMA   TECHNIQUE: CT of the chest, abdomen and pelvis was performed. Contiguous axial images were obtained at 3 mm slice thickness through the chest, abdomen and pelvis. Coronal and sagittal reconstructions at 3 mm slice thickness were performed.  No intravenous contrast was administered; positive oral contrast was given.   FINDINGS: Please note that the study is limited without intravenous contrast.   CHEST:     LUNG/PLEURA/LARGE AIRWAYS: Evaluation of the lung parenchyma is compromised by motion. The large airways appear intact. There is no pleural effusion or pneumothorax. No definite focal lung opacity new from prior lymph within limits of this exam. There is some dependent atelectasis in both posterior costophrenic angles which is more prominent than prior.   VESSELS: Aorta and pulmonary arteries are normal caliber. Mild atherosclerotic changes are noted of the aorta and branching vessels.  Severe coronary artery calcifications are present.   HEART: Normal size.  No pericardial effusion   MEDIASTINUM AND EVERARDO: No mediastinal, hilar or axillary lymph nodes are present.  The esophagus appears normal.   CHEST WALL AND LOWER NECK: The soft tissues of the chest wall appear normal. The thyroid appears normal. No supraclavicular adenopathy.   ABDOMEN:   LIVER: Liver: Within normal limits.   BILE DUCTS: Bile ducts: Normal caliber.   GALLBLADDER: Dependent layering calcified gallstones    PANCREAS: The pancreas appears unremarkable.   SPLEEN: Within normal limits.   ADRENAL GLANDS: Within normal limits.   KIDNEYS AND URETERS: No renal parenchymal lesions. Slight perinephric infiltration all somewhat less than prior. There is very mild bilateral hydroureteronephrosis. This is improved from the prior.   PELVIS:   BLADDER: Decompressed by Iqbal catheter. Likely significant circumferential wall thickening. This is similar to the prior exam. Infiltration of the adjacent fat persists but is improved.   REPRODUCTIVE ORGANS: Unremarkable   BOWEL: CT appearance of the stomach is unremarkable. Small and large bowel loops are normal in caliber without mural thickening. There is considerable colonic diverticulosis mostly in the sigmoid colon without acute diverticulitis. Normal appendix.   VESSELS: The aorta and IVC are within normal limits.   PERITONEUM/RETROPERITONEUM/LYMPH NODES: No ascites or free air, no fluid collection.  The retroperitoneum appears unremarkable.  No abdominopelvic lymphadenopathy is present.   ABDOMINAL WALL: Intact   BONES: No suspicious osseous lesions are identified.       Chest 1.  No definite acute thoracic findings within limits of this exam.   Abdomen-Pelvis 1.  Considerable circumferential bladder wall thickening with infiltration of the adjacent fat this is less pronounced than the prior exam. There is associated bilateral hydro ureteral nephrosis. However this is very mild and is notably improved from the prior exam.     MACRO: None   Signed by: Joseph Schoenberger 3/15/2025 9:46 AM Dictation workstation:   [id number]    XR chest 1 view    Result Date: 3/15/2025  Interpreted By:  Schoenberger, Joseph, STUDY: XR CHEST 1 VIEW;  3/15/2025 7:30 am   INDICATION: Signs/Symptoms:SOB.     COMPARISON: 03/13/2025   ACCESSION NUMBER(S): [id number]   ORDERING CLINICIAN: YOVANI VERMA   FINDINGS:         CARDIOMEDIASTINAL SILHOUETTE: Cardiomediastinal silhouette is normal in size and  configuration.   LUNGS: Lungs are clear.   ABDOMEN: No remarkable upper abdominal findings.   BONES: No acute osseous changes.       1.  No evidence of acute cardiopulmonary process.       MACRO: None   Signed by: Joseph Schoenberger 3/15/2025 7:46 AM Dictation workstation:   [id number]    CT brain attack head wo IV contrast    Result Date: 3/15/2025  Interpreted By:  Schoenberger, Joseph, STUDY: CT BRAIN ATTACK HEAD WO IV CONTRAST;  3/15/2025 7:05 am   INDICATION: Signs/Symptoms:Stroke Evaluation.     COMPARISON: None.   ACCESSION NUMBER(S): [id number]   ORDERING CLINICIAN: YOVANI VERMA   TECHNIQUE: Noncontrast axial CT scan of head was performed. Angled reformats in brain and bone windows were generated. The images were reviewed in bone, brain, blood and soft tissue windows.   FINDINGS: CSF Spaces: Enlarged due to parenchymal volume loss. Normal configuration with the tech basal cisterns. There is no extraaxial fluid collection.   Parenchyma:  Mild periventricular deep white matter hypoattenuation. Gray-white junction intact. No intracranial hemorrhage.   Calvarium: The calvarium is unremarkable.   Paranasal sinuses and mastoids: Visualized paranasal sinuses and mastoids are clear.       No evidence of acute cortical infarct or intracranial hemorrhage.   No evidence of intracranial hemorrhage or displaced skull fracture.   MACRO: Joseph Schoenberger discussed the significance and urgency of this critical finding by telephone with  YOVANI VERMA on 3/15/2025 at 7:23 am.  (**-RCF-**) Findings:  BAT.     Signed by: Joseph Schoenberger 3/15/2025 7:23 AM Dictation workstation:   [id number]    CT cervical spine wo IV contrast    Result Date: 3/14/2025  Interpreted By:  Coral Hill, STUDY: CT CERVICAL SPINE WO IV CONTRAST; ;  3/14/2025 2:40 pm   INDICATION: Signs/Symptoms:hia.     COMPARISON: 03/13/2025   ACCESSION NUMBER(S): [id number]   ORDERING CLINICIAN: SHARON KHAN   TECHNIQUE: Serial axial  CT images obtained of the cervical spine. Images reformatted in the coronal and sagittal projection   All CT examinations are performed with 1 or more of the following dose reduction techniques: Automated exposure control, adjustment of mA and/or kv according to patient's size, or use of iterative reconstruction techniques.   FINDINGS: Alignment of the cervical spine demonstrates mild retrolisthesis of C4 on C5 measuring 2 mm. Also, there is a anterolisthesis of C7 on T1 measuring 4 mm as seen on prior imaging. Multilevel endplate degenerative changes and sclerosis with severe loss disc space height in particular C4/5 through C6/7 with multilevel anterior and posterior disc osteophyte complex. There is unremarkable skull base and occipital condyles. Ring of C1 is intact. Dens and remainder of C2 is unremarkable. There is no acute osseous abnormality in the cervical spine   Evaluation of spinal levels are as follows:   C2/3 demonstrates mild posterior disc osteophyte complex. There is moderate bilateral foraminal narrowing relation to facet and uncovertebral joint hypertrophy   C3/4 demonstrates mild posterior disc osteophyte complex with mild canal stenosis. There is mild bilateral foraminal narrowing.   C4/5 demonstrates posterior disc osteophyte complex and a least moderate canal stenosis with severe bilateral foraminal narrowing.   C5/6 demonstrates posterior disc osteophyte complex and mild canal stenosis. There is moderate bilateral foraminal narrowing.   C6/7 demonstrates posterior disc osteophyte complex and uncovertebral joint hypertrophy in particular right-sided with mild canal stenosis. There is moderate right and mild left foraminal narrowing   Included lung apices are unremarkable. Visualized portions soft tissue neck are unremarkable.               1. No acute osseous abnormality cervical spine.   2. Multilevel degenerative discogenic changes as described above with moderate canal stenosis C4/5 and  mild canal stenosis C3/4, C5/6 and C6/7   3. Multilevel foraminal narrowing as described above.     MACRO: None   Signed by: Coral Hill 3/14/2025 2:54 PM Dictation workstation:   [id number]    CT head wo IV contrast    Result Date: 3/14/2025  Interpreted By:  Coral Hill, STUDY: CT HEAD WO IV CONTRAST; ;  3/14/2025 2:40 pm   INDICATION: Signs/Symptoms:HIA.     COMPARISON: 03/13/2025   ACCESSION NUMBER(S): [id number]   ORDERING CLINICIAN: SHARON KHAN   TECHNIQUE: Serial axial unenhanced CT images obtained of the head. Images reformatted in the coronal and sagittal projection.   FINDINGS: Moderate parenchymal volume loss with prominence of the ventricles, sulci, and cisterns. Gray-white matter differentiation maintained. There is mild periventricular and subcortical white matter change nonspecific and likely related to chronic small-vessel ischemic change. No intra-axial or extra-axial blood or fluid collections are identified. There is a small remote right frontal cortical infarct about the sylvian fissure with focal loss of gray-white differentiation.   Visualized osseous structures demonstrate unremarkable paranasal sinuses and mastoid air cells.       1. No acute intracranial abnormality.   2. Small remote cortical infarct in the right frontal lobe about the sylvian fissure.   3. Periventricular and subcortical white matter change nonspecific and likely related to chronic small-vessel ischemic change.     MACRO: None   Signed by: Coral Hill 3/14/2025 2:47 PM Dictation workstation:   [id number]              Assessment/Plan   Cheko Jin is a 79 y.o. male with history of HTN, A-fib (on Eliquis), BPH, HLD, bladder cancer (followed at [facility]) who presents to [facility] on 3/15 for left-sided weakness and shaking. Urology service consulted for hematuria.      Impression  - hematuria   - STUART  - Bladder wall thickening  - B/L hydroureteronephrosis; Improved from 3/13  - Leukopenia  - Anemia       Recommendations  - OK to re-start Eliquis at discretion of primary team given hematuria does not appear to be severe.   - Continue to trend Cr. Suspect that level has peaked and will trend down soon, call urology back if this does not occur  - Treat positive urine culture with full abx course   - Maintain zamorano catheter    > may need manual irrigation if decreased urine return  - Remainder of care per primary team   - Follow up with urology on discharge     Urology service will sign off. Please reach out should new needs arise.     Patient discussed with attending surgeon, Dr. Reveles.      Emma Byrd PA-C

## 2025-03-16 NOTE — CARE PLAN
The clinical goals for the shift include Patient to ween off vasopressors      Problem: Discharge Planning  Goal: Discharge to home or other facility with appropriate resources  Outcome: Progressing     Problem: Chronic Conditions and Co-morbidities  Goal: Patient's chronic conditions and co-morbidity symptoms are monitored and maintained or improved  Outcome: Progressing     Problem: Nutrition  Goal: Nutrient intake appropriate for maintaining nutritional needs  Outcome: Progressing     Problem: Skin  Goal: Decreased wound size/increased tissue granulation at next dressing change  Outcome: Progressing  Goal: Participates in plan/prevention/treatment measures  Outcome: Progressing  Goal: Prevent/manage excess moisture  Outcome: Progressing  Goal: Prevent/minimize sheer/friction injuries  Outcome: Progressing  Goal: Promote/optimize nutrition  Outcome: Progressing  Goal: Promote skin healing  Outcome: Progressing     Problem: Pain - Adult  Goal: Verbalizes/displays adequate comfort level or baseline comfort level  Outcome: Met     Problem: Safety - Adult  Goal: Free from fall injury  Outcome: Met

## 2025-03-16 NOTE — CONSULTS
Reason For Consult  Acute kidney injury    History Of Present Illness  Cheko Jin is a 79 y.o. male presenting with altered mental status.  He presented to the emergency room from [place] where he stays.  Apparently prior to his visit to the emergency room, he did have 2 falls and suffered a laceration to his left forehead.  On arrival to the emergency room this admission, he was found to be hypotensive.  He was started on vasopressor support.  CT scan of his chest abdomen and pelvis showed circumferential bladder wall thickening with bilateral hydronephrosis that was actually improved from a prior study.  He was started on broad-spectrum antibiotics and started on IV fluids.  He was admitted to the ICU and was also given 1 unit of platelets and PRBCs.  Iqbal catheter was placed and urology was consulted.  Of note he does have a history of bladder cancer.    Creatinine was 3.52 on admission up from 2.21 just the day prior.  His baseline creatinine appears to be in the 1.8-2.1 range.  He seems to be unaware of any chronic kidney disease.  He does have a history of dementia so he is not the best historian.  Since admission, he is growing gram-negative rods in his blood as well as in his urine.  He does not appear to be on any ACE, ARB or diuretics.  He also does not take any ibuprofen.    Currently he is asking me why he is still in the hospital.  He does not seem to have good insight into what is concerning him.  He is eating lunch.  Denies any chest pain.  Denies any shortness of breath.  Denies any nausea or vomiting at this time.  As stated above, he does have a Iqbal that appears to have some blood-tinged urine.     Past Medical History  1.  Coronary artery disease  2.  Carotid artery disease  3.  Atrial fibrillation  4.  Dyslipidemia  5.  Hypertension  6.  Gout  7.  Multiple strokes  8.  Bladder cancer  9.  Obstructive sleep apnea on CPAP  10.  Underlying history of chronic kidney disease stage III-IV with  a baseline creatinine around 2  11.  Heart failure with reduced EF 17%    Surgical History  1.  Transurethral resection of the prostate  2.  Bilateral inguinal hernia repair  3.  Right carotid endarterectomy  4.  Right hip arthroplasty  5.  Knee arthroscopy on the left side  6.  Colonoscopy     Social History  He reports that he has never smoked. He has never been exposed to tobacco smoke. He has never used smokeless tobacco. He reports that he does not currently use alcohol. Drug use questions deferred to the physician.  Currently staying at assisted living    Family History  Family History   Problem Relation Name Age of Onset    No Known Problems Mother      No Known Problems Father          Allergies  Trazodone, Cetirizine, Isosorbide, Levofloxacin, Lisinopril, Loratadine, and Statins-hmg-coa reductase inhibitors    Medications  No current facility-administered medications on file prior to encounter.     Current Outpatient Medications on File Prior to Encounter   Medication Sig Dispense Refill    acetaminophen (Tylenol) 325 mg tablet Take 2 tablets (650 mg) by mouth every 4 hours if needed for mild pain (1 - 3) or fever (temp greater than 38.0 C).      amiodarone (Pacerone) 200 mg tablet Take 1 tablet (200 mg) by mouth once daily.      apixaban (Eliquis) 5 mg tablet Take 1 tablet (5 mg) by mouth twice a day.      aspirin 81 mg EC tablet Take 1 tablet (81 mg) by mouth once daily.      divalproex (Depakote) 250 mg EC tablet Take 1 tablet (250 mg) by mouth twice a day.      finasteride (Proscar) 5 mg tablet Take 1 tablet (5 mg) by mouth once daily.      loperamide (Imodium A-D) 2 mg tablet Take 1-2 tablets (2-4 mg) by mouth 4 times a day as needed for diarrhea.      magnesium hydroxide (Milk of Magnesia) 400 mg/5 mL suspension Take 30 mL by mouth once daily as needed for constipation.      melatonin 3 mg tablet Take 1 tablet (3 mg) by mouth once daily at bedtime.      metoprolol succinate XL (Toprol-XL) 25 mg 24 hr  tablet Take 1 tablet (25 mg) by mouth once daily.      rivastigmine (Exelon) 4.6 mg/24 hour Place 1 patch on the skin once daily.      tamsulosin (Flomax) 0.4 mg 24 hr capsule Take 1 capsule (0.4 mg) by mouth once daily at bedtime.      allopurinol (Zyloprim) 300 mg tablet Take 0.5 tablets (150 mg) by mouth once daily. (Patient not taking: Reported on 3/15/2025)      atorvastatin (Lipitor) 40 mg tablet Take 1 tablet (40 mg) by mouth once daily. (Patient taking differently: Take 1 tablet (40 mg) by mouth once daily at bedtime.) 30 tablet 0    cholecalciferol (Vitamin D-3) 50 MCG (2000 UT) tablet Take 1 tablet (50 mcg) by mouth once daily. (Patient not taking: Reported on 3/15/2025)      multivitamin with minerals tablet Take 1 tablet by mouth once daily. (Patient not taking: Reported on 3/15/2025)           Review of Systems  Please see HPI     Physical Exam  Gen: NAD, pleasant but somewhat confused  HEENT: Normocephalic  Neck: No jugular venous distention  Respiratory: Diminished at the bases but otherwise clear  CVS: No rub  Abdomen: Soft  Extremities: Minimal peripheral edema  Neuro: Poor recall, slight confusion.  Moves all extremities           I&O 24HR    Intake/Output Summary (Last 24 hours) at 3/16/2025 1439  Last data filed at 3/16/2025 1300  Gross per 24 hour   Intake 2444.01 ml   Output 1730 ml   Net 714.01 ml       Vitals 24HR  Heart Rate:  [65-90]   Temp:  [35.8 °C (96.4 °F)-37.9 °C (100.2 °F)]   Resp:  [20-49]   BP: ()/(48-77)   Weight:  [60.8 kg (133 lb 15.9 oz)-60.8 kg (134 lb)]   SpO2:  [88 %-100 %]         Relevant Results  BCX: E.coli noted  UCX: E.coli  Cr 3.58, HCO 18, K 3.9  Hb 11.3, WBC 14.8    Recent baseline Cr 2.2       Assessment/Plan   1.  Acute kidney injury on top of chronic kidney disease stage III-IV.  Baseline creatinine is in the low 2 range.  Creatinine currently in the mid 3 range.  Likely cause of STUART is sepsis induced acute tubular injury.  He was hypotensive on admission.   Has gram-negative rods growing in the blood.  He does have what appears to be chronic bilateral hydronephrosis now with a Iqbal catheter.  He is nonoliguric.  I do not believe that obstructive nephropathy is playing a role in his acute kidney injury at this time  2.  Bilateral hydronephrosis in the setting of bladder cancer.  Does not appear to be acutely obstructed.  Currently has a Iqbal catheter in place  3.  Metabolic acidosis  4.  Chronic systolic congestive heart failure with an ejection fraction less than 20%  5.  E. coli bacteremia likely from a urinary source.    Plan:  1.  Norepinephrine as needed to maintain a MAP greater than 65  2.  Check a urine sodium and urine creatinine  3.  Consider repeat renal imaging in 1 to 2 days now that he has a Iqbal catheter in place  4.  IV antibiotic therapy for E. coli bacteremia likely from urinary source  5.  No diuretics at this time.  Despite his low ejection fraction, he does not appear to be volume overloaded  6.  Maintain Iqbal for the time being.  7.  No urgent need for renal replacement therapy    Thank you for involving me in the care of this patient.  If any questions, please do not hesitate to call      Merlin Laura MD

## 2025-03-16 NOTE — CARE PLAN
Problem: Skin  Goal: Decreased wound size/increased tissue granulation at next dressing change  3/16/2025 1836 by Winsome Lindquist RN  Outcome: Progressing  3/16/2025 1835 by Winsome Lindquist RN  Outcome: Progressing  Goal: Participates in plan/prevention/treatment measures  3/16/2025 1836 by Winsome Lindquist RN  Outcome: Progressing  3/16/2025 1835 by Winsome Lindquist RN  Outcome: Progressing  Goal: Prevent/manage excess moisture  3/16/2025 1836 by Winsome Lindquist RN  Outcome: Progressing  3/16/2025 1835 by Winsome Lindquist RN  Outcome: Progressing  Goal: Prevent/minimize sheer/friction injuries  3/16/2025 1836 by Winsome Lindquist RN  Outcome: Progressing  3/16/2025 1835 by Winsome Lindquist RN  Outcome: Progressing  Goal: Promote/optimize nutrition  3/16/2025 1836 by Winsome Lindquist RN  Outcome: Progressing  3/16/2025 1835 by Winsome Lindquist RN  Outcome: Progressing  Goal: Promote skin healing  3/16/2025 1836 by Winsome Lindquist RN  Outcome: Progressing  3/16/2025 1835 by Winsome Lindquist RN  Outcome: Progressing   The patient's goals for the shift include      The clinical goals for the shift include Pt will remain safe during this shift and hemodynamically stable    Over the shift, the patient did not make progress toward the following goals. Barriers to progression include acuity of illness.

## 2025-03-16 NOTE — PROGRESS NOTES
SUBJECTIVE     Patient was seen and examined today.  He appears to be resting comfortably and has no complaints at this time.    Past Medical History:  Past Medical History:   Diagnosis Date    Alzheimer's dementia (Multi)     Atrial flutter (Multi)     CAD (coronary artery disease)     nonobstructive    CKD (chronic kidney disease)     Dilated cardiomyopathy (Multi)     Gout     HFrEF (heart failure with reduced ejection fraction) (Multi)     Hypertension     Malignant neoplasm of bladder, unspecified (Multi)     NSVT (nonsustained ventricular tachycardia) (Multi)     Lifevest placed    Sleep apnea     Stroke (cerebrum) (Multi)     3 strokes R frontal, parietal lobes (July, 2010, September, 2010 and October, 2010), etiology embolic due to 40-50% ulcerated R ICA plaque s/p R CEA 10/12/10       Past Surgical History:  Past Surgical History:   Procedure Laterality Date    HERNIA REPAIR Bilateral     TRANSURETHRAL RESECTION OF PROSTATE          Family History:  Family History   Problem Relation Name Age of Onset    No Known Problems Mother      No Known Problems Father          Social History:   reports that he has never smoked. He has never been exposed to tobacco smoke. He has never used smokeless tobacco. He reports that he does not currently use alcohol. Drug use questions deferred to the physician.    OBJECTIVE     Vitals:    03/16/25 1000 03/16/25 1100 03/16/25 1200 03/16/25 1300   BP: 100/57 106/51 96/54 94/70   BP Location:       Patient Position:       Pulse: 71 70 65 74   Resp: 22 24 26 (!) 28   Temp:   36.5 °C (97.7 °F)    TempSrc:   Temporal    SpO2: 99% 99% 98% 99%   Weight:       Height:          Results from last 7 days   Lab Units 03/16/25  0849 03/15/25  1506 03/15/25  0839 03/15/25  0722 03/13/25  1832   WBC AUTO x10*3/uL 14.8*   < > 3.6*   < > 14.1*   HEMOGLOBIN g/dL 11.3*   < > 9.7*   < > 11.8*   HEMATOCRIT % 36.6*   < > 29.1*   < > 36.1*   PLATELETS AUTO x10*3/uL 99*   < > 113*   < > 175   NEUTROS  PCT AUTO %  --   --   --   --  92.4   LYMPHO PCT MAN %  --   --  2.0   < >  --    LYMPHS PCT AUTO %  --   --   --   --  1.4   MONO PCT MAN %  --   --  4.0   < >  --    MONOS PCT AUTO %  --   --   --   --  5.5   EOSINO PCT MAN %  --   --  0.0   < >  --    EOS PCT AUTO %  --   --   --   --  0.0    < > = values in this interval not displayed.     Results from last 7 days   Lab Units 03/16/25  0849 03/15/25  0722   SODIUM mmol/L 137 136   POTASSIUM mmol/L 3.9 3.8   CHLORIDE mmol/L 106 103   CO2 mmol/L 18* 17*   BUN mg/dL 57* 50*   CREATININE mg/dL 3.58* 3.52*   CALCIUM mg/dL 8.0* 8.9   PROTEIN TOTAL g/dL  --  6.3*   BILIRUBIN TOTAL mg/dL  --  1.3*   ALK PHOS U/L  --  54   ALT U/L  --  10   AST U/L  --  19   GLUCOSE mg/dL 127* 116*     24hr Min/Max:  Temp  Min: 35.8 °C (96.4 °F)  Max: 37.9 °C (100.2 °F)  Pulse  Min: 65  Max: 98  BP  Min: 83/48  Max: 136/77  Resp  Min: 20  Max: 49  SpO2  Min: 88 %  Max: 99 %  LDA:   Urethral Catheter Coude 14 Fr. (Active)   Placement Date/Time: 03/15/25 0856   Hand Hygiene Completed: Yes  Catheter Type: Coude  Tube Size (Fr.): 14 Fr.  Catheter Balloon Size: 10 mL  Urine Returned: Yes   Number of days: 0     Vent settings:     Hemodynamic parameters for last 24 hours:         Intake/Output Summary (Last 24 hours) at 3/16/2025 1322  Last data filed at 3/16/2025 1200  Gross per 24 hour   Intake 2204.01 ml   Output 1805 ml   Net 399.01 ml     All other labs and Imaging have been personally reviewed.     Scheduled Medications  acetaminophen, 650 mg, oral, q6h  amiodarone, 200 mg, oral, Daily  [Held by provider] apixaban, 5 mg, oral, BID  [Held by provider] aspirin, 81 mg, oral, Daily  atorvastatin, 40 mg, oral, Daily  divalproex, 250 mg, oral, q12h ANTONY  finasteride, 5 mg, oral, Daily  [Held by provider] metoprolol succinate XL, 25 mg, oral, Daily  piperacillin-tazobactam, 2.25 g, intravenous, q8h  rivastigmine, 1 patch, transdermal, Daily  [Held by provider] tamsulosin, 0.4 mg, oral,  Nightly       Continuous Medications:   norepinephrine, 0-0.2 mcg/kg/min, Last Rate: Stopped (03/16/25 0001)      Physical Exam:  General:  Pleasant and cooperative. No apparent distress.  HEENT:  Normocephalic, atraumatic  Chest:  Clear to auscultation bilaterally. No wheezes, rales, or rhonchi.  CV: Irregular rate and rhythm, no murmurs  Abdomen: Abdomen is soft, non-tender, non-distended. BS +   Extremities:  No lower extremity edema or cyanosis.   Neurological:  AAOx1. No focal deficits.  Skin:  Warm and dry.    ASSESSMENT & PLAN   Cheko Jin is a 79 y.o. 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 History from patient was limited due to advanced dementia. Patient comes from [facility].  Patient was recently seen in the ED after a fall, workup was negative at that time.    Daily progress  3/16: Patient hemodynamically stable and off pressors.  Blood cultures growing gram-negative and gram-positive bacteria,  continue Zosyn at this time.  Hematuria appears improved based on appearance of Iqbal.  Hgb stable today.  Creatinine does not appear to be improving at this time, will consult nephrology.    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% 8/2024 echo  #History of carotid stenosis s/p right CEA  PLAN:  -No clinical signs of decompensated heart failure  -Patient currently on Levophed, 0.03 mcg  -Continue home dose  amiodarone  -Hold home dose metoprolol and Eliquis  -Trend lactate  -Maintain MAPs>65     Pulmonary  #Respiratory Alkalosis  #Tachypnea  #GOMEZ on CPAP  PLAN:  -Likely secondary to septic shock, will continue to treat underlying infection and monitor    GI  #Hyperlipidemia  PLAN:  -Continue home atorvastatin    Renal  #SUTART on CKD stage III  #Gross Hematuria  #Complicated UTI  #Mild bilateral ureteral hydronephrosis  #History of bladder cancer  -UA significant for greater than 50 WBC, 4+ bacteria  PLAN:  -Likely prerenal cause of STUART in setting of sepsis, patient is s/p 1.5 L bolus, will continue to monitor  -Hold home dose Flomax  -Urology consulted  -Nephrology consulted    Endocrine  #No acute issues  PLAN:  -Will continue to monitor    Heme/Onc  #Acute leukopenia-resolved  #Acute blood loss anemia in setting of chronic anemia, low suspicion for hemorrhagic shock  PLAN:  -Patient has received 1 unit of PRBCs in ED  -Suspect leukopenia secondary to sepsis, patient does not have a history of leukopenia  -Hgb appears stable, we will continue to monitor    ID  #Septic shock  PLAN:  -Vancomycin and Zosyn in the ED, will DC vancomycin and continue renally dosed Zosyn  -Blood cultures growing gram-positive and gram-negative bacteria, pending urine culture    Skin/MSK  #Gout  #Healing laceration on forehead from previous fall  PLAN:  -Site of laceration does not appear infected  -Hold home dose allopurinol in setting of STUART    ICU CHECK LIST  Antimicrobials: Zosyn  Oxygen: 2L NC  Feeding: Adult regular  Drips: -  Fluids: 1.5L NS bolus total  Analgesia: -   Sedation: -  VTE ppx: SCDs  GI ppx: Protonix daily  Glycemic control:-  Bowel care:-  Indwelling catheters: Iqbal  Lines: -  Code Status: DNR YESSY Alonso  PGY-1, Internal Medicine  Please SecureChat for any further questions  This is a preliminary note, please await attending attestation for final A/P

## 2025-03-16 NOTE — CARE PLAN
Problem: Discharge Planning  Goal: Discharge to home or other facility with appropriate resources  Outcome: Progressing     Problem: Chronic Conditions and Co-morbidities  Goal: Patient's chronic conditions and co-morbidity symptoms are monitored and maintained or improved  Outcome: Progressing     Problem: Nutrition  Goal: Nutrient intake appropriate for maintaining nutritional needs  Outcome: Progressing     Problem: Skin  Goal: Decreased wound size/increased tissue granulation at next dressing change  Outcome: Progressing  Goal: Participates in plan/prevention/treatment measures  Outcome: Progressing  Goal: Prevent/manage excess moisture  Outcome: Progressing  Goal: Prevent/minimize sheer/friction injuries  Outcome: Progressing  Goal: Promote/optimize nutrition  Outcome: Progressing  Goal: Promote skin healing  Outcome: Progressing   The patient's goals for the shift include      The clinical goals for the shift include Pt will remain safe during this shift and hemodynamically stable    Over the shift, the patient did not make progress toward the following goals. Barriers to progression include acuity of pt illness and congnition. Recommendations to address these barriers include follow plan of care

## 2025-03-16 NOTE — NURSING NOTE
Report received from CHANTALE RN   With rounds, patient resting in bed without distress.  Patient is sleeping after seroquel dose given for agitation this afternoon.  Patient not awakened at this time.  Iqbal catheter draining light tea colored urine.  VSS.

## 2025-03-17 VITALS
RESPIRATION RATE: 23 BRPM | HEIGHT: 70 IN | WEIGHT: 134 LBS | BODY MASS INDEX: 19.18 KG/M2 | OXYGEN SATURATION: 98 % | HEART RATE: 76 BPM | SYSTOLIC BLOOD PRESSURE: 110 MMHG | TEMPERATURE: 98.6 F | DIASTOLIC BLOOD PRESSURE: 57 MMHG

## 2025-03-17 LAB
ANION GAP SERPL CALC-SCNC: 13 MMOL/L (ref 10–20)
BUN SERPL-MCNC: 54 MG/DL (ref 6–23)
CALCIUM SERPL-MCNC: 7.9 MG/DL (ref 8.6–10.3)
CHLORIDE SERPL-SCNC: 108 MMOL/L (ref 98–107)
CO2 SERPL-SCNC: 21 MMOL/L (ref 21–32)
CREAT SERPL-MCNC: 3.15 MG/DL (ref 0.5–1.3)
EGFRCR SERPLBLD CKD-EPI 2021: 19 ML/MIN/1.73M*2
ERYTHROCYTE [DISTWIDTH] IN BLOOD BY AUTOMATED COUNT: 14.3 % (ref 11.5–14.5)
GLUCOSE SERPL-MCNC: 113 MG/DL (ref 74–99)
HCT VFR BLD AUTO: 31.6 % (ref 41–52)
HGB BLD-MCNC: 10.2 G/DL (ref 13.5–17.5)
MCH RBC QN AUTO: 31.2 PG (ref 26–34)
MCHC RBC AUTO-ENTMCNC: 32.3 G/DL (ref 32–36)
MCV RBC AUTO: 97 FL (ref 80–100)
NRBC BLD-RTO: 0 /100 WBCS (ref 0–0)
PLATELET # BLD AUTO: 90 X10*3/UL (ref 150–450)
POTASSIUM SERPL-SCNC: 3.6 MMOL/L (ref 3.5–5.3)
RBC # BLD AUTO: 3.27 X10*6/UL (ref 4.5–5.9)
SODIUM SERPL-SCNC: 138 MMOL/L (ref 136–145)
WBC # BLD AUTO: 9.6 X10*3/UL (ref 4.4–11.3)

## 2025-03-17 PROCEDURE — 1200000002 HC GENERAL ROOM WITH TELEMETRY DAILY

## 2025-03-17 PROCEDURE — 80048 BASIC METABOLIC PNL TOTAL CA: CPT

## 2025-03-17 PROCEDURE — 2500000001 HC RX 250 WO HCPCS SELF ADMINISTERED DRUGS (ALT 637 FOR MEDICARE OP): Performed by: INTERNAL MEDICINE

## 2025-03-17 PROCEDURE — 97161 PT EVAL LOW COMPLEX 20 MIN: CPT | Mod: GP

## 2025-03-17 PROCEDURE — 2500000004 HC RX 250 GENERAL PHARMACY W/ HCPCS (ALT 636 FOR OP/ED)

## 2025-03-17 PROCEDURE — 2500000001 HC RX 250 WO HCPCS SELF ADMINISTERED DRUGS (ALT 637 FOR MEDICARE OP)

## 2025-03-17 PROCEDURE — 99233 SBSQ HOSP IP/OBS HIGH 50: CPT

## 2025-03-17 PROCEDURE — 85027 COMPLETE CBC AUTOMATED: CPT

## 2025-03-17 PROCEDURE — 2500000002 HC RX 250 W HCPCS SELF ADMINISTERED DRUGS (ALT 637 FOR MEDICARE OP, ALT 636 FOR OP/ED)

## 2025-03-17 PROCEDURE — 36415 COLL VENOUS BLD VENIPUNCTURE: CPT

## 2025-03-17 PROCEDURE — 2500000004 HC RX 250 GENERAL PHARMACY W/ HCPCS (ALT 636 FOR OP/ED): Performed by: INTERNAL MEDICINE

## 2025-03-17 PROCEDURE — 99222 1ST HOSP IP/OBS MODERATE 55: CPT | Performed by: INTERNAL MEDICINE

## 2025-03-17 RX ORDER — MIDODRINE HYDROCHLORIDE 2.5 MG/1
5 TABLET ORAL
Status: DISCONTINUED | OUTPATIENT
Start: 2025-03-17 | End: 2025-03-19 | Stop reason: HOSPADM

## 2025-03-17 RX ADMIN — ACETAMINOPHEN 650 MG: 325 TABLET, FILM COATED ORAL at 17:23

## 2025-03-17 RX ADMIN — PIPERACILLIN SODIUM AND TAZOBACTAM SODIUM 2.25 G: 2; .25 INJECTION, SOLUTION INTRAVENOUS at 05:26

## 2025-03-17 RX ADMIN — ACETAMINOPHEN 650 MG: 325 TABLET, FILM COATED ORAL at 10:58

## 2025-03-17 RX ADMIN — DIVALPROEX SODIUM 250 MG: 250 TABLET, DELAYED RELEASE ORAL at 09:27

## 2025-03-17 RX ADMIN — FINASTERIDE 5 MG: 5 TABLET, FILM COATED ORAL at 09:27

## 2025-03-17 RX ADMIN — DIVALPROEX SODIUM 250 MG: 250 TABLET, DELAYED RELEASE ORAL at 21:41

## 2025-03-17 RX ADMIN — RIVASTIGMINE 1 PATCH: 4.6 PATCH, EXTENDED RELEASE TRANSDERMAL at 09:27

## 2025-03-17 RX ADMIN — ACETAMINOPHEN 650 MG: 325 TABLET, FILM COATED ORAL at 22:57

## 2025-03-17 RX ADMIN — ATORVASTATIN CALCIUM 40 MG: 40 TABLET, FILM COATED ORAL at 09:27

## 2025-03-17 RX ADMIN — AMIODARONE HYDROCHLORIDE 200 MG: 200 TABLET ORAL at 09:27

## 2025-03-17 RX ADMIN — MIDODRINE HYDROCHLORIDE 5 MG: 2.5 TABLET ORAL at 13:19

## 2025-03-17 RX ADMIN — MIDODRINE HYDROCHLORIDE 5 MG: 2.5 TABLET ORAL at 17:23

## 2025-03-17 RX ADMIN — ACETAMINOPHEN 650 MG: 325 TABLET, FILM COATED ORAL at 05:26

## 2025-03-17 RX ADMIN — PIPERACILLIN SODIUM AND TAZOBACTAM SODIUM 2.25 G: 2; .25 INJECTION, SOLUTION INTRAVENOUS at 22:30

## 2025-03-17 RX ADMIN — APIXABAN 5 MG: 5 TABLET, FILM COATED ORAL at 21:41

## 2025-03-17 RX ADMIN — PIPERACILLIN SODIUM AND TAZOBACTAM SODIUM 2.25 G: 2; .25 INJECTION, SOLUTION INTRAVENOUS at 13:19

## 2025-03-17 ASSESSMENT — ENCOUNTER SYMPTOMS
CARDIOVASCULAR NEGATIVE: 1
CONSTITUTIONAL NEGATIVE: 1
EYES NEGATIVE: 1
MUSCULOSKELETAL NEGATIVE: 1
PSYCHIATRIC NEGATIVE: 1
GASTROINTESTINAL NEGATIVE: 1
CONFUSION: 1
RESPIRATORY NEGATIVE: 1
WEAKNESS: 1

## 2025-03-17 ASSESSMENT — PAIN SCALES - WONG BAKER: WONGBAKER_NUMERICALRESPONSE: HURTS LITTLE BIT

## 2025-03-17 ASSESSMENT — COGNITIVE AND FUNCTIONAL STATUS - GENERAL
TURNING FROM BACK TO SIDE WHILE IN FLAT BAD: A LITTLE
MOBILITY SCORE: 16
MOVING TO AND FROM BED TO CHAIR: A LITTLE
WALKING IN HOSPITAL ROOM: A LITTLE
MOVING FROM LYING ON BACK TO SITTING ON SIDE OF FLAT BED WITH BEDRAILS: A LITTLE
STANDING UP FROM CHAIR USING ARMS: A LITTLE
CLIMB 3 TO 5 STEPS WITH RAILING: TOTAL

## 2025-03-17 ASSESSMENT — PAIN - FUNCTIONAL ASSESSMENT
PAIN_FUNCTIONAL_ASSESSMENT: 0-10
PAIN_FUNCTIONAL_ASSESSMENT: 0-10

## 2025-03-17 ASSESSMENT — PAIN SCALES - GENERAL
PAINLEVEL_OUTOF10: 0 - NO PAIN
PAINLEVEL_OUTOF10: 0 - NO PAIN
PAINLEVEL_OUTOF10: 2

## 2025-03-17 NOTE — PROGRESS NOTES
Physical Therapy    Physical Therapy Evaluation    Patient Name: Cheko Jin  MRN: [id number]  135/135-A  Today's Date: 3/17/2025   Time Calculation  Start Time: 1545  Stop Time: 1609  Time Calculation (min): 24 min    Assessment/Plan   PT Assessment  PT Assessment Results: Decreased strength, Decreased endurance, Impaired balance, Decreased mobility  Rehab Prognosis: Good  Barriers to Discharge Home: Cognition needs  Cognition Needs: 24hr supervision for safety awareness needed  End of Session Communication: Bedside nurse  Assessment Comment: Pt is presenting with a decline in functional mobility from baseline. Pt would benefit from further PT services to address these deficits to return to prior level of function.  End of Session Patient Position: Bed, 3 rail up, Alarm on  IP OR SWING BED PT PLAN  Inpatient or Swing Bed: Inpatient  PT Plan  Treatment/Interventions: Bed mobility, Transfer training, Gait training, Balance training, Strengthening, Endurance training, Therapeutic exercise, Therapeutic activity  PT Plan: Ongoing PT  PT Frequency: 3 times per week  PT Discharge Recommendations: Moderate intensity level of continued care  PT - OK to Discharge: Yes    Subjective     Current Problem:  Patient Active Problem List   Diagnosis    Hypertensive heart disease with heart failure    Atrial fibrillation (Multi)    Weakness    BPH (benign prostatic hyperplasia)    Gout    Hyperlipidemia    Hyponatremia    STUART (acute kidney injury) (CMS-[facility])       General Visit Information:  General  Reason for Referral: PT eval and treat; presents to the emergency department by EMS as a stroke alert activation from [facility] for evaluation of shaking and altered mental status; Suspect acute metabolic encephalopathy 2/2 UTI in setting of dementia  Referred By: Germán Mills DO  Past Medical History Relevant to Rehab: 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), CAD, A-fib, CKD stage III, carotid stenosis s/p right CEA, dyslipidemia,  hypertension, gout,, bladder cancer CVA, GOMEZ on CPAP, advanced dementia who presents to [facility] ED with reports of confusion  Family/Caregiver Present: No  Co-Treatment: OT  Co-Treatment Reason: For patient safety and to maximize mobility  Prior to Session Communication: Bedside nurse  Patient Position Received: Bed, 3 rail up, Alarm off, not on at start of session  General Comment: Pt resting in bed upon entering, agreeable to PT    Home Living:  Home Living  Home Living Comments: Pt admitted from [facility] unit. No concerns. Handicapped accessible and staff available to assist    Prior Level of Function:  Prior Function Per Pt/Caregiver Report  Prior Function Comments: Per EMR and pt report: independent with ambulation without device. Pt has had 4 recent falls. Pt states he is independent with ADLs.    Precautions:  Precautions  Precautions Comment: telemetry    Vital Signs:  Vital Signs  Vital Signs Comment: VSS throughout session  Objective     Pain:  Pain Assessment  Pain Assessment: 0-10  0-10 (Numeric) Pain Score: 0 - No pain    Cognition:  Cognition  Overall Cognitive Status:  (Pt confused throughout session. Oriented to self and place only. Short term memory deficits. Forgetful throughout session.)    General Assessments:      Activity Tolerance  Endurance: Endurance does not limit participation in activity  Early Mobility/Exercise Safety Screen: Proceed with mobilization - No exclusion criteria met  Sensation  Light Touch: No apparent deficits     Perception  Inattention/Neglect: Appears intact  Coordination  Movements are Fluid and Coordinated: Yes  Postural Control  Postural Control: Within Functional Limits  Static Sitting Balance  Static Sitting-Level of Assistance: Close supervision  Dynamic Sitting Balance  Dynamic Sitting-Level of Assistance: Close supervision  Static Standing Balance  Static Standing-Level of Assistance: Contact guard  Dynamic Standing Balance  Dynamic Standing-Level of  Assistance: Minimum assistance    Functional Assessments:     Bed Mobility  Bed Mobility: Yes  Bed Mobility 1  Bed Mobility Comments 1: supine<>sit MIN assist with HOB raised  Transfers  Transfer: Yes  Transfer 1  Trials/Comments 1: sit to stand CGA with WW with cues for hand placement, safety and technique  Ambulation/Gait Training  Ambulation/Gait Training Performed:  (Pt completes lateral sidesteps toward HOB with min assist with WW. Pt requires increased cues for safe use of walker. Pt unsteady throughout)          Extremity/Trunk Assessments:        RLE   RLE : Within Functional Limits  LLE   LLE : Within Functional Limits    Outcome Measures:  [facility] Basic Mobility  Turning from your back to your side while in a flat bed without using bedrails: A little  Moving from lying on your back to sitting on the side of a flat bed without using bedrails: A little  Moving to and from bed to chair (including a wheelchair): A little  Standing up from a chair using your arms (e.g. wheelchair or bedside chair): A little  To walk in hospital room: A little  Climbing 3-5 steps with railing: Total  Basic Mobility - Total Score: 16           FSS-ICU  Ambulation: Walks <50 feet with any assistance x1 or walks any distance with assistance x2 people  Rolling: Minimal assistance (performs 75% or more of task)  Sitting: Supervision or set-up only  Transfer Sit-to-Stand: Minimal assistance (performs 75% or more of task)  Transfer Supine-to-Sit: Minimal assistance (performs 75% or more of task)  Total Score: 18  ICU Mobility Screen  Early Mobility/Exercise Safety Screen: Proceed with mobilization - No exclusion criteria met  ICU Mobility Scale: Walking with assistance of 1 person  E = Exercise and Early Mobility  Early Mobility/Exercise Safety Screen: Proceed with mobilization - No exclusion criteria met  ICU Mobility Scale: Walking with assistance of 1 person          Goals:  Encounter Problems       Encounter Problems (Active)        PT Problem       PT Goal 1 STG - Pt will amb 90' using LRD with SBA  (Progressing)       Start:  03/17/25    Expected End:  03/31/25            PT Goal 2 STG - Pt will transition supine <> sitting with SUP (Progressing)       Start:  03/17/25    Expected End:  03/31/25            PT Goal 3 STG - Pt will SPT bed <> chair with SBA (Progressing)       Start:  03/17/25    Expected End:  03/31/25            PT Goal 4 STG - Pt will transfer STS with SBA (Progressing)       Start:  03/17/25    Expected End:  03/31/25               Pain - Adult            Education Documentation  Mobility Training, taught by Dulce Deal, PT at 3/17/2025  4:27 PM.  Learner: Patient  Readiness: Acceptance  Method: Explanation  Response: Verbalizes Understanding, Needs Reinforcement  Comment: safety during mobility    Education Comments  No comments found.

## 2025-03-17 NOTE — PROGRESS NOTES
25 1539   Discharge Planning   Living Arrangements Other (Comment)  (Pt resides at [facility])   Support Systems Family members   Type of Residence Private residence   Number of Stairs to Enter Residence 0   Number of Stairs Within Residence 0   Do you have animals or pets at home? No   Who is requesting discharge planning? Provider     [facility] engaged with the pt's POA, Franki telephonically. MSW made introduction and explained role. Franki provides the pt's , address and insurance. Franki reports the pt has dx of Dementia and his short term memory is severely impacted. Franki reports the pt does not ambulate with any devices and has had 4 falls in the last 10 days. Farnki discloses the pt had been to this ED on each occasion. Franki denies SW needs at this time. Franki reports the pt will return to [facility] when medically cleared. Franki is able to transport and request CT make contact with him to coordinate discharge. Pt has floor orders in for room 605.   PCP Gael Collins Last visit was less than 5 days ago per POA.

## 2025-03-17 NOTE — NURSING NOTE
"Patient awakens confused and calling out for nurse.  Patient states that he needs to leave and is not easily redirected.  Patient believes it is 1976 and that \"I have a business to run\".  Patient reoriented to place, time and situation.  Attempts to explain to patient that he has an infection and that he has to stay in the hospital are not accepted.  Patient refuses po medications.  IV antibiotic provided.    "

## 2025-03-17 NOTE — CONSULTS
PODIATRIC MEDICINE & SURGERY - CONSULT NOTE    SERVICE DATE: 3/17/2025  SERVICE TIME: 13:00    HPI    Patient is a 79 y.o. 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 History from patient was limited due to advanced dementia. Patient comes from [facility].  Patient was recently seen in the ED after a fall, workup was negative at that time.     Podiatry consulted for nail care. Patient states he has trouble trimming his nails. States he has a friend that trims them. Denies being diabetic. Denies numbness, burning, tingling to legs. Denies constitutional symptoms and has no other pedal complaints at this time.         Review of Systems   Constitutional: Negative.    HENT: Negative.     Eyes: Negative.    Respiratory: Negative.     Cardiovascular: Negative.    Gastrointestinal: Negative.    Genitourinary: Negative.    Musculoskeletal: Negative.    Skin: Negative.    Psychiatric/Behavioral: Negative.           Past Medical History:   Diagnosis Date    Alzheimer's dementia (Multi)     Atrial flutter (Multi)     CAD (coronary artery disease)     nonobstructive    CKD (chronic kidney disease)     Dilated cardiomyopathy (Multi)     Gout     HFrEF (heart failure with reduced ejection fraction) (Multi)     Hypertension     Malignant neoplasm of bladder, unspecified (Multi)     NSVT (nonsustained ventricular tachycardia) (Multi)     Lifevest placed    Sleep apnea     Stroke (cerebrum) (Multi)     3 strokes R frontal, parietal lobes (July, 2010, September, 2010 and October, 2010), etiology embolic due to 40-50% ulcerated R ICA plaque s/p R CEA 10/12/10         Problem List Items Addressed This Visit       * (Principal) STUART (acute kidney injury) (CMS-[facility]) - Primary     Other Visit Diagnoses       Generalized weakness        Altered mental status, unspecified altered mental status type         Hypotension, unspecified hypotension type        Elevated troponin        Septic shock (Multi)        Hemorrhagic shock (Multi)        Urinary tract infection, bacterial        Gross hematuria        Acute respiratory failure with hypoxia (Multi)                  Medications and Allergies reviewed.      PHYSICAL EXAM    Vitals:    03/17/25 1300   BP: 127/60   Pulse: 72   Resp: (!) 35   Temp:    SpO2: 97%       General: AOx3. NAD. Pleasant. Cooperative.    Vasc: DP and PT pulses are faintly palpable b/l. CFT < 3 seconds to hallux b/l. Skin temperature is warm to cool from proximal to distal b/l. No erythema b/l LE. No edema B/L LE. Absent hair growth.     Neuro: Light touch intact feet b/l.     MSK: Strength 5/5 for all pedal groups b/l. Ankle, STJ ROM intact to age and mobility status. HAV B/L, hammertoes 1-5 B/L.     Derm:   Nails thickened, elongated, dystrophic. Skin normal turgor. No rashes or wounds noted.         RESULTS    CBC  Lab Results   Component Value Date    WBC 9.6 03/17/2025    HGB 10.2 (L) 03/17/2025    HCT 31.6 (L) 03/17/2025    MCV 97 03/17/2025    PLT 90 (L) 03/17/2025         HgbA1c  Lab Results   Component Value Date    HGBA1C 6.1 (H) 05/10/2024       Cultures  Susceptibility data from last 90 days.  Collected Specimen Info Organism Ampicillin Cefazolin Cefazolin (uncomplicated UTIs only) Ciprofloxacin Gentamicin Nitrofurantoin Piperacillin/Tazobactam Trimethoprim/Sulfamethoxazole   03/15/25 Blood culture from Peripheral Venipuncture Escherichia coli           03/15/25 Urine from Clean Catch/Voided Enteric bacilli           03/15/25 Blood culture from Peripheral Venipuncture Escherichia coli           03/13/25 Urine from Indwelling (Iqbal) Catheter Escherichia coli  S  S  S  R  S  S  S  S         ASSESSMENT  #onychomycosis  #pain in toes both feet      PLAN  - Charts and results reviewed. A comprehensive history was taken and physical exam was performed.   - Educated on findings, diagnosis,  treatment plans.  - Toenails 1-5 B/L debrided in thickness and length without incident.   - Patient states he is uninterested in becoming established at this time with a podiatrist for nail care. States his family friend is willing to continue trimming his nails.  - Podiatry will sing off at this time.      This patient was discussed and evaluated with Dr. Campos Borjas DPM.    This is a preliminary note with attending attestation to follow.     Layla Stevenson DPM PGY1  Podiatric Medicine & Surgery    Epic Secure Chat preferred

## 2025-03-17 NOTE — CARE PLAN
The patient's goals for the shift include      The clinical goals for the shift include Pt will remain safe during this shift and hemodynamically stable    Over the shift, the patient did not make progress toward the following goals. Barriers to progression include . Recommendations to address these barriers include .    Problem: Discharge Planning  Goal: Discharge to home or other facility with appropriate resources  Outcome: Not Progressing     Problem: Chronic Conditions and Co-morbidities  Goal: Patient's chronic conditions and co-morbidity symptoms are monitored and maintained or improved  Outcome: Not Progressing     Problem: Nutrition  Goal: Nutrient intake appropriate for maintaining nutritional needs  Outcome: Not Progressing     Problem: Skin  Goal: Decreased wound size/increased tissue granulation at next dressing change  Outcome: Not Progressing  Goal: Participates in plan/prevention/treatment measures  Outcome: Not Progressing  Goal: Prevent/manage excess moisture  Outcome: Not Progressing  Goal: Prevent/minimize sheer/friction injuries  Outcome: Not Progressing  Goal: Promote/optimize nutrition  Outcome: Not Progressing  Goal: Promote skin healing  Outcome: Not Progressing

## 2025-03-17 NOTE — CONSULTS
Consults    Reason For Consult  Medical management    History Of Present Illness  Cheko Jin is a 79 y.o. male PMHx HTN, HLD, CAD, A-fib on Eliquis, HFrEF, carotid artery disease s/p right CEA, GOMEZ on CPAP, CKD 3, h/o strokes, h/o bladder cancer, advance dementia (A&Ox1-2 at baseline) presented to [facility] ED from [facility] for weakness and altered mental status.  He had fallen 2 days ago, sustained a laceration to left forehead.  In the ED, he was hypotensive, given IVFs.  Patient remained hypotensive and levophed infusion was initiated.  Blood work significant for elevated creatinine 3.52, elevated total bilirubin, elevated lactate.  BNP was 681 and troponin 57.  UA concerning for infection.  CT head and CT C spine unremarkable.  CT abd/pelv showed bladder wall thickening and bilateral hydronephrosis.  Patient started on empiric Vancomycin and Zosyn and admitted to the ICU on levophed.     Patient currently has no complaints.  He knows he is in the hospital but does not remember the reason why.  I explained to him we are treating him for a severe infection.  He is asking when he can leave the hsoptial.       Past Medical History  He has a past medical history of Alzheimer's dementia (Multi), Atrial flutter (Multi), CAD (coronary artery disease), CKD (chronic kidney disease), Dilated cardiomyopathy (Multi), Gout, HFrEF (heart failure with reduced ejection fraction) (Multi), Hypertension, Malignant neoplasm of bladder, unspecified (Multi), NSVT (nonsustained ventricular tachycardia) (Multi), Sleep apnea, and Stroke (cerebrum) (Multi).    Surgical History  He has a past surgical history that includes Hernia repair (Bilateral) and Transurethral resection of prostate.     Social History  He reports that he has never smoked. He has never been exposed to tobacco smoke. He has never used smokeless tobacco. He reports that he does not currently use alcohol. Drug use questions deferred to the physician.    Family  History  Family History   Problem Relation Name Age of Onset    No Known Problems Mother      No Known Problems Father          Allergies  Trazodone, Cetirizine, Isosorbide, Levofloxacin, Lisinopril, Loratadine, and Statins-hmg-coa reductase inhibitors    Review of Systems   Neurological:  Positive for weakness.   Psychiatric/Behavioral:  Positive for confusion.    All other systems reviewed and are negative.       Physical Exam     GEN:  awake, alert, not in acute distress  HEENT:  left forehead scalp laceration with sutures  Cardio:  RRR,  Resp:  CTAB, no wheezing  Abd:  +BS, soft, nontender  :  Iqbal in place  Neuro:  A&Ox1, CN2-12 grossly intact, no focal deficits  Msk:  no gross deformities, no joint effusions  Skin:  warm, dry, intact  Psych:  pleasantly confused, repeats questions     Last Recorded Vitals  /59   Pulse 61   Temp 35.9 °C (96.6 °F) (Temporal)   Resp 23   Wt 75.5 kg (166 lb 8 oz)   SpO2 99%     Relevant Results  Results for orders placed or performed during the hospital encounter of 03/15/25 (from the past 24 hours)   Basic metabolic panel   Result Value Ref Range    Glucose 113 (H) 74 - 99 mg/dL    Sodium 138 136 - 145 mmol/L    Potassium 3.6 3.5 - 5.3 mmol/L    Chloride 108 (H) 98 - 107 mmol/L    Bicarbonate 21 21 - 32 mmol/L    Anion Gap 13 10 - 20 mmol/L    Urea Nitrogen 54 (H) 6 - 23 mg/dL    Creatinine 3.15 (H) 0.50 - 1.30 mg/dL    eGFR 19 (L) >60 mL/min/1.73m*2    Calcium 7.9 (L) 8.6 - 10.3 mg/dL   CBC   Result Value Ref Range    WBC 9.6 4.4 - 11.3 x10*3/uL    nRBC 0.0 0.0 - 0.0 /100 WBCs    RBC 3.27 (L) 4.50 - 5.90 x10*6/uL    Hemoglobin 10.2 (L) 13.5 - 17.5 g/dL    Hematocrit 31.6 (L) 41.0 - 52.0 %    MCV 97 80 - 100 fL    MCH 31.2 26.0 - 34.0 pg    MCHC 32.3 32.0 - 36.0 g/dL    RDW 14.3 11.5 - 14.5 %    Platelets 90 (L) 150 - 450 x10*3/uL     Scheduled medications  acetaminophen, 650 mg, oral, q6h  amiodarone, 200 mg, oral, Daily  [Held by provider] apixaban, 5 mg, oral,  BID  [Held by provider] aspirin, 81 mg, oral, Daily  atorvastatin, 40 mg, oral, Daily  divalproex, 250 mg, oral, q12h ANTONY  finasteride, 5 mg, oral, Daily  [Held by provider] metoprolol succinate XL, 25 mg, oral, Daily  midodrine, 5 mg, oral, TID  piperacillin-tazobactam, 2.25 g, intravenous, q8h  rivastigmine, 1 patch, transdermal, Daily  [Held by provider] tamsulosin, 0.4 mg, oral, Nightly      Continuous medications     PRN medications         Assessment/Plan     # Septic shock  # Bacteremia  # STUART on CKD 3  # Iqbal catheter associated UTI present on admission  # Bilateral hydronephrosis   # Gross hematuria  # Type 2 MI  # Thrombocytopenia  # Recurrent falls  # Forehead laceration    Atrial fibrillation on Eliquis  GOMEZ on CPAP  HFmrEF 45%  CAD  Advance dementia  H/o right CEA  H/o strokes    Patient admitted for septic shock 2/2 e. Coli bacteremia from  source, weaned off pressors and blood pressure is stable.  Blood and urine culture reviewed, positive for e. Coli.  Awaiting sensitivities.  Continue treatment with renally dosed Zosyn.  Creatinine downtrending, no need for IVFs at this time.  Leave Iqbal in place to drain, Iqbal was exchanged in the ED.  Urology note reviewed, ok to resume Eliquis.  However, it appears patient has had many falls recently.  Consideration for stopping Eliquis due to high risk of falls needs to be addressed with hcpoa prior to discharge.  He has a history of multiple strokes.  Hgb stable.  Thrombocytopenia in setting of sepsis, continue to monitor.  Continue amiodarone.  He has advanced dementia, continue Depakote and Rivastigmine.      Germán Mills DO  Senior Attending Physician  [facility] Medicine  Clinical Instructor

## 2025-03-17 NOTE — PROGRESS NOTES
SUBJECTIVE     Patient was seen and examined today.  He appears to be resting comfortably and has no complaints at this time.    Past Medical History:  Past Medical History:   Diagnosis Date    Alzheimer's dementia (Multi)     Atrial flutter (Multi)     CAD (coronary artery disease)     nonobstructive    CKD (chronic kidney disease)     Dilated cardiomyopathy (Multi)     Gout     HFrEF (heart failure with reduced ejection fraction) (Multi)     Hypertension     Malignant neoplasm of bladder, unspecified (Multi)     NSVT (nonsustained ventricular tachycardia) (Multi)     Lifevest placed    Sleep apnea     Stroke (cerebrum) (Multi)     3 strokes R frontal, parietal lobes (July, 2010, September, 2010 and October, 2010), etiology embolic due to 40-50% ulcerated R ICA plaque s/p R CEA 10/12/10       Past Surgical History:  Past Surgical History:   Procedure Laterality Date    HERNIA REPAIR Bilateral     TRANSURETHRAL RESECTION OF PROSTATE          Family History:  Family History   Problem Relation Name Age of Onset    No Known Problems Mother      No Known Problems Father          Social History:   reports that he has never smoked. He has never been exposed to tobacco smoke. He has never used smokeless tobacco. He reports that he does not currently use alcohol. Drug use questions deferred to the physician.    OBJECTIVE     Vitals:    03/17/25 1000 03/17/25 1100 03/17/25 1200 03/17/25 1300   BP: 103/57 115/63 120/59 127/60   Pulse: 55 60 61 72   Resp: 23 20 23 (!) 35   Temp:   35.9 °C (96.6 °F)    TempSrc:   Temporal    SpO2: 99% 100% 99% 97%   Weight:       Height:          Results from last 7 days   Lab Units 03/17/25  0553 03/15/25  1506 03/15/25  0839 03/15/25  0722 03/13/25  1832   WBC AUTO x10*3/uL 9.6   < > 3.6*   < > 14.1*   HEMOGLOBIN g/dL 10.2*   < > 9.7*   < > 11.8*   HEMATOCRIT % 31.6*   < > 29.1*   < > 36.1*   PLATELETS AUTO x10*3/uL 90*   < > 113*   < > 175   NEUTROS PCT AUTO %  --   --   --   --  92.4    LYMPHO PCT MAN %  --   --  2.0   < >  --    LYMPHS PCT AUTO %  --   --   --   --  1.4   MONO PCT MAN %  --   --  4.0   < >  --    MONOS PCT AUTO %  --   --   --   --  5.5   EOSINO PCT MAN %  --   --  0.0   < >  --    EOS PCT AUTO %  --   --   --   --  0.0    < > = values in this interval not displayed.     Results from last 7 days   Lab Units 03/17/25  0553 03/16/25  0849 03/15/25  0722   SODIUM mmol/L 138   < > 136   POTASSIUM mmol/L 3.6   < > 3.8   CHLORIDE mmol/L 108*   < > 103   CO2 mmol/L 21   < > 17*   BUN mg/dL 54*   < > 50*   CREATININE mg/dL 3.15*   < > 3.52*   CALCIUM mg/dL 7.9*   < > 8.9   PROTEIN TOTAL g/dL  --   --  6.3*   BILIRUBIN TOTAL mg/dL  --   --  1.3*   ALK PHOS U/L  --   --  54   ALT U/L  --   --  10   AST U/L  --   --  19   GLUCOSE mg/dL 113*   < > 116*    < > = values in this interval not displayed.     24hr Min/Max:  Temp  Min: 35.9 °C (96.6 °F)  Max: 37 °C (98.6 °F)  Pulse  Min: 54  Max: 84  BP  Min: 80/46  Max: 130/61  Resp  Min: 17  Max: 35  SpO2  Min: 97 %  Max: 100 %  LDA:   Urethral Catheter Coude 14 Fr. (Active)   Placement Date/Time: 03/15/25 0856   Hand Hygiene Completed: Yes  Catheter Type: Coude  Tube Size (Fr.): 14 Fr.  Catheter Balloon Size: 10 mL  Urine Returned: Yes   Number of days: 0     Vent settings:     Hemodynamic parameters for last 24 hours:         Intake/Output Summary (Last 24 hours) at 3/17/2025 1308  Last data filed at 3/17/2025 1000  Gross per 24 hour   Intake 830 ml   Output 2020 ml   Net -1190 ml     All other labs and Imaging have been personally reviewed.     Scheduled Medications  acetaminophen, 650 mg, oral, q6h  amiodarone, 200 mg, oral, Daily  [Held by provider] apixaban, 5 mg, oral, BID  [Held by provider] aspirin, 81 mg, oral, Daily  atorvastatin, 40 mg, oral, Daily  divalproex, 250 mg, oral, q12h ANTONY  finasteride, 5 mg, oral, Daily  [Held by provider] metoprolol succinate XL, 25 mg, oral, Daily  midodrine, 5 mg, oral, TID  piperacillin-tazobactam,  2.25 g, intravenous, q8h  rivastigmine, 1 patch, transdermal, Daily  [Held by provider] tamsulosin, 0.4 mg, oral, Nightly       Continuous Medications:        Physical Exam:  General:  Pleasant and cooperative. No apparent distress.  HEENT:  Normocephalic, atraumatic  Chest:  Clear to auscultation bilaterally. No wheezes, rales, or rhonchi.  CV: Irregular rate and rhythm, no murmurs  Abdomen: Abdomen is soft, non-tender, non-distended. BS +   Extremities:  No lower extremity edema or cyanosis.   Neurological:  AAOx1. No focal deficits.  Skin:  Warm and dry.    ASSESSMENT & PLAN   Cheko Jin is a 79 y.o. 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 History from patient was limited due to advanced dementia. Patient [facility].  Patient was recently seen in the ED after a fall, workup was negative at that time.    Daily progress  3/17: Patient hemodynamically stable and off pressors.  Midodrine started today for soft pressures, although it appears that BP stable and low pressure reading may have been a transient one time occurrence.  Blood cultures growing gram-negative and gram-positive bacteria, urine culture growing enteric bacteria, continue Zosyn at this time.  Hematuria appears improved based on appearance of Iqbal.  Hgb stable today.  Creatinine improving, nephrology following.  Discussed with hospitalist team for transfer to medical floor today.    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% 8/2024 echo  #History of carotid stenosis s/p right CEA  PLAN:  -No clinical signs of decompensated heart failure  -Patient currently on Levophed, 0.03 mcg  -Continue home dose amiodarone  -Hold home dose metoprolol and Eliquis  -Trend lactate  -Maintain MAPs>65     Pulmonary  #Respiratory Alkalosis  #Tachypnea  #GOMEZ on CPAP  PLAN:  -Likely secondary to septic shock, will continue to treat underlying infection and monitor    GI  #Hyperlipidemia  PLAN:  -Continue home atorvastatin    Renal  #STUART on CKD stage III  #Gross Hematuria  #Complicated UTI  #Mild bilateral ureteral hydronephrosis  #History of bladder cancer  -UA significant for greater than 50 WBC, 4+ bacteria  PLAN:  -Likely prerenal cause of STAURT in setting of sepsis, patient is s/p 1.5 L bolus, will continue to monitor  -Hold home dose Flomax  -Urology consulted  -Nephrology consulted    Endocrine  #No acute issues  PLAN:  -Will continue to monitor    Heme/Onc  #Acute leukopenia-resolved  #Acute blood loss anemia in setting of chronic anemia, low suspicion for hemorrhagic shock  PLAN:  -Patient has received 1 unit of PRBCs in ED  -Suspect leukopenia secondary to sepsis, patient does not have a history of leukopenia  -Hgb appears stable, we will continue to monitor    ID  #Septic shock  PLAN:  -Vancomycin and Zosyn in the ED, will DC vancomycin and continue renally dosed Zosyn  -Blood cultures growing gram-positive and gram-negative bacteria, pending urine culture    Skin/MSK  #Gout  #Healing laceration on forehead from previous fall  #Onychomycosis  PLAN:  -Site of laceration does not appear infected  -Hold home dose allopurinol in setting of STUART  -Consult to podiatry for onychomycosis    ICU CHECK LIST  Antimicrobials: Zosyn  Oxygen: 2L NC  Feeding: Adult regular  Drips: -  Fluids: 1.5L NS bolus total  Analgesia: -   Sedation: -  VTE ppx: SCDs  GI ppx: Protonix  daily  Glycemic control:-  Bowel care:-  Indwelling catheters: Iqbal  Lines: -  Code Status: DNR YESSY Alonso  PGY-1, Internal Medicine  Please SecureChat for any further questions  This is a preliminary note, please await attending attestation for final A/P

## 2025-03-17 NOTE — CARE PLAN
The patient's goals for the shift include      The clinical goals for the shift include Pt will remain safe during this shift and hemodynamically stable    Over the shift, the patient did not make progress toward the following goals. Barriers to progression include . Recommendations to address these barriers include .    Problem: Discharge Planning  Goal: Discharge to home or other facility with appropriate resources  Outcome: Not Progressing     Problem: Chronic Conditions and Co-morbidities  Goal: Patient's chronic conditions and co-morbidity symptoms are monitored and maintained or improved  Outcome: Not Progressing     Problem: Nutrition  Goal: Nutrient intake appropriate for maintaining nutritional needs  Outcome: Not Progressing     Problem: Skin  Goal: Decreased wound size/increased tissue granulation at next dressing change  Outcome: Not Progressing  Goal: Participates in plan/prevention/treatment measures  Outcome: Not Progressing  Goal: Prevent/manage excess moisture  Outcome: Not Progressing  Goal: Prevent/minimize sheer/friction injuries  Outcome: Not Progressing  Goal: Promote/optimize nutrition  3/16/2025 2114 by Franco Salmeron RN  Flowsheets (Taken 3/16/2025 2114)  Promote/optimize nutrition: Consume > 50% meals/supplements  3/16/2025 2114 by Franco Salmeron RN  Outcome: Not Progressing  Flowsheets (Taken 3/16/2025 2114)  Promote/optimize nutrition: Consume > 50% meals/supplements  Goal: Promote skin healing  Outcome: Not Progressing

## 2025-03-17 NOTE — NURSING NOTE
"Called \"zac\" the pt's POA to let him know the patient will be moving to room 605. Zac will call family & s.o. and update them on condition and bed change. jerry Priest noted @ this time.   "

## 2025-03-17 NOTE — PROGRESS NOTES
"Patient seen and examined.  Remains confused.  No longer on pressors.  He is making urine.  He is eating and drinking according to the nurse    Scheduled medications  acetaminophen, 650 mg, oral, q6h  amiodarone, 200 mg, oral, Daily  [Held by provider] apixaban, 5 mg, oral, BID  [Held by provider] aspirin, 81 mg, oral, Daily  atorvastatin, 40 mg, oral, Daily  divalproex, 250 mg, oral, q12h ANTONY  finasteride, 5 mg, oral, Daily  [Held by provider] metoprolol succinate XL, 25 mg, oral, Daily  piperacillin-tazobactam, 2.25 g, intravenous, q8h  rivastigmine, 1 patch, transdermal, Daily  [Held by provider] tamsulosin, 0.4 mg, oral, Nightly      Continuous medications  norepinephrine, 0-0.2 mcg/kg/min, Last Rate: Stopped (03/16/25 0001)      PRN medications    Blood pressure 94/53, pulse 59, temperature 36.9 °C (98.4 °F), temperature source Temporal, resp. rate 25, height 1.778 m (5' 10\"), weight 75.5 kg (166 lb 8 oz), SpO2 99%.      Intake/Output Summary (Last 24 hours) at 3/17/2025 0799  Last data filed at 3/17/2025 0539  Gross per 24 hour   Intake 1670 ml   Output 2395 ml   Net -725 ml     Gen: NAD  HEENT: facial abrasion left forehead  Resp: clear on anterior exam  CVS: no rub  Abd: soft  Ext: no edema    K 3.6, HCO 21, Cr 3.15 (peak 3.58)  Hb 10.2  BCX: E.coli in both sets, GPC in pairs/chains in 1/2  UCX: E.coli    CT C/A/P: bladder thickening, improving hydro    Impression:  1.  Acute kidney injury likely due to hemodynamic insult in the setting of septic shock.  Has gram-negative deb bacteremia.  He does have chronic bilateral hydronephrosis now with a Iqbal catheter in good urine output.  I do not believe obstructive uropathy is playing a role in his acute kidney injury.  Peak creatinine was 3.58 now down to 3.15 with good urine output  2.  Underlying chronic kidney disease stage III-IV with a baseline creatinine in the low 2 range  3.  What appears to be bilateral hydronephrosis in the setting of bladder cancer.  " Might be a chronic issue.  Currently with a Iqbal  4.  Resolved metabolic acidosis  5.  Chronic systolic congestive heart failure with an ejection fraction less than 20.  He appears to be euvolemic at this time  6.  E. coli bacteremia from a urinary source    Plan:  1.  No need for any IV fluids at this time  2.  Maintain MAP greater than 65.  Currently he is off vasopressor support  3.  Consider repeat ultrasound in the next 1 to 2 days to see if his hydronephrosis has resolved  4.  Agree with broad-spectrum antibiotic therapy given his bacteremia  5.  No need for any diuretic therapy  6.  Maintain Iqbal catheter for the time being  7.  No need for any renal replacement therapy      Merlin Laura MD

## 2025-03-18 ENCOUNTER — APPOINTMENT (OUTPATIENT)
Dept: RADIOLOGY | Facility: HOSPITAL | Age: 80
DRG: 698 | End: 2025-03-18
Payer: MEDICARE

## 2025-03-18 LAB
ANION GAP SERPL CALC-SCNC: 14 MMOL/L (ref 10–20)
BACTERIA UR CULT: ABNORMAL
BUN SERPL-MCNC: 48 MG/DL (ref 6–23)
CALCIUM SERPL-MCNC: 8.3 MG/DL (ref 8.6–10.3)
CHLORIDE SERPL-SCNC: 108 MMOL/L (ref 98–107)
CO2 SERPL-SCNC: 23 MMOL/L (ref 21–32)
CREAT SERPL-MCNC: 3.11 MG/DL (ref 0.5–1.3)
EGFRCR SERPLBLD CKD-EPI 2021: 20 ML/MIN/1.73M*2
ERYTHROCYTE [DISTWIDTH] IN BLOOD BY AUTOMATED COUNT: 14.2 % (ref 11.5–14.5)
GLUCOSE SERPL-MCNC: 140 MG/DL (ref 74–99)
HCT VFR BLD AUTO: 34.8 % (ref 41–52)
HGB BLD-MCNC: 11 G/DL (ref 13.5–17.5)
MAGNESIUM SERPL-MCNC: 2.31 MG/DL (ref 1.6–2.4)
MCH RBC QN AUTO: 30.6 PG (ref 26–34)
MCHC RBC AUTO-ENTMCNC: 31.6 G/DL (ref 32–36)
MCV RBC AUTO: 97 FL (ref 80–100)
NRBC BLD-RTO: 0 /100 WBCS (ref 0–0)
PLATELET # BLD AUTO: 115 X10*3/UL (ref 150–450)
POTASSIUM SERPL-SCNC: 3.8 MMOL/L (ref 3.5–5.3)
RBC # BLD AUTO: 3.6 X10*6/UL (ref 4.5–5.9)
SODIUM SERPL-SCNC: 141 MMOL/L (ref 136–145)
WBC # BLD AUTO: 8.5 X10*3/UL (ref 4.4–11.3)

## 2025-03-18 PROCEDURE — 2500000001 HC RX 250 WO HCPCS SELF ADMINISTERED DRUGS (ALT 637 FOR MEDICARE OP)

## 2025-03-18 PROCEDURE — 97165 OT EVAL LOW COMPLEX 30 MIN: CPT | Mod: GO

## 2025-03-18 PROCEDURE — 99233 SBSQ HOSP IP/OBS HIGH 50: CPT | Performed by: INTERNAL MEDICINE

## 2025-03-18 PROCEDURE — 76770 US EXAM ABDO BACK WALL COMP: CPT

## 2025-03-18 PROCEDURE — 36415 COLL VENOUS BLD VENIPUNCTURE: CPT

## 2025-03-18 PROCEDURE — 85027 COMPLETE CBC AUTOMATED: CPT

## 2025-03-18 PROCEDURE — 76770 US EXAM ABDO BACK WALL COMP: CPT | Performed by: STUDENT IN AN ORGANIZED HEALTH CARE EDUCATION/TRAINING PROGRAM

## 2025-03-18 PROCEDURE — 2500000004 HC RX 250 GENERAL PHARMACY W/ HCPCS (ALT 636 FOR OP/ED)

## 2025-03-18 PROCEDURE — 83735 ASSAY OF MAGNESIUM: CPT | Performed by: STUDENT IN AN ORGANIZED HEALTH CARE EDUCATION/TRAINING PROGRAM

## 2025-03-18 PROCEDURE — 2500000004 HC RX 250 GENERAL PHARMACY W/ HCPCS (ALT 636 FOR OP/ED): Performed by: INTERNAL MEDICINE

## 2025-03-18 PROCEDURE — 1200000002 HC GENERAL ROOM WITH TELEMETRY DAILY

## 2025-03-18 PROCEDURE — 2500000001 HC RX 250 WO HCPCS SELF ADMINISTERED DRUGS (ALT 637 FOR MEDICARE OP): Performed by: INTERNAL MEDICINE

## 2025-03-18 PROCEDURE — 80048 BASIC METABOLIC PNL TOTAL CA: CPT

## 2025-03-18 RX ORDER — CEFTRIAXONE 1 G/50ML
1 INJECTION, SOLUTION INTRAVENOUS EVERY 24 HOURS
Status: DISCONTINUED | OUTPATIENT
Start: 2025-03-18 | End: 2025-03-19 | Stop reason: HOSPADM

## 2025-03-18 RX ORDER — HALOPERIDOL LACTATE 5 MG/ML
2 INJECTION, SOLUTION INTRAMUSCULAR ONCE
Status: COMPLETED | OUTPATIENT
Start: 2025-03-18 | End: 2025-03-18

## 2025-03-18 RX ADMIN — ACETAMINOPHEN 650 MG: 325 TABLET, FILM COATED ORAL at 16:15

## 2025-03-18 RX ADMIN — HALOPERIDOL LACTATE 2 MG: 5 INJECTION, SOLUTION INTRAMUSCULAR at 19:14

## 2025-03-18 RX ADMIN — ACETAMINOPHEN 650 MG: 325 TABLET, FILM COATED ORAL at 05:32

## 2025-03-18 RX ADMIN — APIXABAN 5 MG: 5 TABLET, FILM COATED ORAL at 20:18

## 2025-03-18 RX ADMIN — DIVALPROEX SODIUM 250 MG: 250 TABLET, DELAYED RELEASE ORAL at 20:18

## 2025-03-18 RX ADMIN — PIPERACILLIN SODIUM AND TAZOBACTAM SODIUM 2.25 G: 2; .25 INJECTION, SOLUTION INTRAVENOUS at 05:40

## 2025-03-18 RX ADMIN — CEFTRIAXONE SODIUM 1 G: 1 INJECTION, SOLUTION INTRAVENOUS at 16:15

## 2025-03-18 ASSESSMENT — COGNITIVE AND FUNCTIONAL STATUS - GENERAL
EATING MEALS: A LITTLE
EATING MEALS: A LITTLE
CLIMB 3 TO 5 STEPS WITH RAILING: TOTAL
PERSONAL GROOMING: A LOT
STANDING UP FROM CHAIR USING ARMS: A LOT
MOVING TO AND FROM BED TO CHAIR: A LOT
WALKING IN HOSPITAL ROOM: A LOT
MOBILITY SCORE: 10
DAILY ACTIVITIY SCORE: 12
MOVING FROM LYING ON BACK TO SITTING ON SIDE OF FLAT BED WITH BEDRAILS: A LOT
DRESSING REGULAR LOWER BODY CLOTHING: A LOT
DRESSING REGULAR UPPER BODY CLOTHING: A LOT
DRESSING REGULAR UPPER BODY CLOTHING: A LOT
WALKING IN HOSPITAL ROOM: TOTAL
DRESSING REGULAR LOWER BODY CLOTHING: TOTAL
DRESSING REGULAR LOWER BODY CLOTHING: A LOT
MOVING FROM LYING ON BACK TO SITTING ON SIDE OF FLAT BED WITH BEDRAILS: A LOT
STANDING UP FROM CHAIR USING ARMS: A LOT
EATING MEALS: A LOT
TOILETING: TOTAL
TURNING FROM BACK TO SIDE WHILE IN FLAT BAD: A LOT
PERSONAL GROOMING: A LOT
HELP NEEDED FOR BATHING: A LOT
TOILETING: A LOT
DAILY ACTIVITIY SCORE: 12
TOILETING: A LOT
EATING MEALS: A LITTLE
PERSONAL GROOMING: A LITTLE
DAILY ACTIVITIY SCORE: 13
DRESSING REGULAR LOWER BODY CLOTHING: A LOT
MOBILITY SCORE: 11
TOILETING: A LOT
PERSONAL GROOMING: A LOT
DAILY ACTIVITIY SCORE: 13
MOVING TO AND FROM BED TO CHAIR: A LOT
TURNING FROM BACK TO SIDE WHILE IN FLAT BAD: A LOT
DRESSING REGULAR UPPER BODY CLOTHING: A LOT
DRESSING REGULAR UPPER BODY CLOTHING: A LOT
HELP NEEDED FOR BATHING: A LOT
HELP NEEDED FOR BATHING: A LOT
CLIMB 3 TO 5 STEPS WITH RAILING: TOTAL
HELP NEEDED FOR BATHING: A LOT

## 2025-03-18 ASSESSMENT — PAIN - FUNCTIONAL ASSESSMENT: PAIN_FUNCTIONAL_ASSESSMENT: 0-10

## 2025-03-18 ASSESSMENT — PAIN SCALES - GENERAL
PAINLEVEL_OUTOF10: 0 - NO PAIN

## 2025-03-18 NOTE — CONSULTS
"Infectious Disease Consult Note     Reason for consult: E.coli bacteremia, s/p septic shock     HPI  Cheko Jin is a 79 y.o. year old male with a history of 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), CAD, A-fib, CKD stage III, carotid stenosis s/p right CEA, dyslipidemia, hypertension, gout,, bladder cancer CVA, GOMEZ on CPAP presented to [facility] from [facility] on 3/13, was found to be in septic shock and E.coli bacteremia likely from  source. Leukocytosis of 14 on admission. Imaging positive for circumferential bladder wall thickening with fat infiltration and bilateral hydroureteronephrosis. Cth negative for acute findings. Hemodynamically unstable on admission, started on broad spectrum ABX and admitted to the ICU for pressor support. Subsequently deescalated to regular nursing floor. While here,   Patient has been on Zosyn since admission. Deescalated from vancomycin given negative MRSA PCR. Given a unit of platelets and unit of PRBC. UCX on admission growing E.coli, BC x 2 on admission as well - resistant to ciprofloxacin and levofloxacin and susceptible to ampicillin, cefazolin, gentamicin, Zosyn, Bactrim. Vancomycin Dc'd ISO negative MRSA PCR. On renally dosed Zosyn, day 4.       PMHx: As above  PSHx: ACL repair, bilateral hernia repair, TURBT, eye procedures  Social Hx: No tobacco use hx, 2 alcohol drinks / week  Family Hx: CAD, HTN, CVA     Antimicrobials:     Objective   /90 (BP Location: Right arm, Patient Position: Lying)   Pulse 76   Temp 37 °C (98.6 °F) (Temporal)   Resp 20   Ht 1.778 m (5' 10\")   Wt 76.2 kg (167 lb 15.9 oz)   SpO2 98%   BMI 24.10 kg/m²      Physical Exam:   GENERAL: Awake/alert, cooperative, NAD, pleasantly confused.  CARDIOVASCULAR: HRRR.   RESPIRATORY: No respiratory distress evident.   ABDOMEN:  Soft, no tenderness, nondistended.  EXTREMITIES:  No peripheral edema. Peripheral pulses intact.  SKIN:  Warm and dry. No tenting. No rash or open wound noted.  NEUROLOGICAL:  " Nonfocal grossly.    Relevant Results:  Labs  Reviewed.  Susceptibility data from last 90 days.  Collected Specimen Info Organism Ampicillin Cefazolin Cefazolin (uncomplicated UTIs only) Ciprofloxacin Gentamicin Levofloxacin Nitrofurantoin Piperacillin/Tazobactam Trimethoprim/Sulfamethoxazole   03/15/25 Blood culture from Peripheral Venipuncture Escherichia coli            03/15/25 Urine from Clean Catch/Voided Escherichia coli  S  S  S  R  S   S  S  S   03/15/25 Blood culture from Peripheral Venipuncture Escherichia coli  S  S   R  S  R   S  S   03/13/25 Urine from Indwelling (Iqbal) Catheter Escherichia coli  S  S  S  R  S   S  S  S     Imaging  Reviewed.     Assessment/Plan   E.coli bacteremia, s/p septic shock  E.coli cystitis with bilateral hydronephrosis and hematuria    Switched Zosyn to Rocephin while inpatient. On discharge, may switch over to high dose amoxicillin q12h to complete a 14 day antibiotic course          I reviewed and interpreted all lab test imaging studies and documentations from other healthcare providers.  I am monitoring for antibiotic side effects and toxicity.     This is a preliminary note, please await attending attestation for final recommendations     Willie Hunt, DO  Internal Medicine Resident PGY2

## 2025-03-18 NOTE — CARE PLAN
The clinical goals for the shift include comfort and safety    Problem: Discharge Planning  Goal: Discharge to home or other facility with appropriate resources  Outcome: Progressing  Flowsheets (Taken 3/18/2025 0322)  Discharge to home or other facility with appropriate resources: Identify barriers to discharge with patient and caregiver     Problem: Chronic Conditions and Co-morbidities  Goal: Patient's chronic conditions and co-morbidity symptoms are monitored and maintained or improved  Outcome: Progressing  Flowsheets (Taken 3/18/2025 0322)  Care Plan - Patient's Chronic Conditions and Co-Morbidity Symptoms are Monitored and Maintained or Improved: Monitor and assess patient's chronic conditions and comorbid symptoms for stability, deterioration, or improvement     Problem: Nutrition  Goal: Nutrient intake appropriate for maintaining nutritional needs  Outcome: Progressing     Problem: Skin  Goal: Decreased wound size/increased tissue granulation at next dressing change  Outcome: Progressing  Flowsheets (Taken 3/18/2025 0322)  Decreased wound size/increased tissue granulation at next dressing change: Promote sleep for wound healing

## 2025-03-18 NOTE — PROGRESS NOTES
Occupational Therapy    Evaluation    Patient Name: Cheko Jin  MRN: [id number]  Today's Date: 3/18/2025  Time Calculation  Start Time: 1329  Stop Time: 1342  Time Calculation (min): 13 min    Assessment  IP OT Assessment  OT Assessment: Patient presents with a decline in functional status and would benefit from O.T. services at a moderate intensity to improve independence with ADLs, transfers & mobility.  Prognosis: Good  Barriers to Discharge Home: Physical needs  Physical Needs: 24hr mobility assistance needed, 24hr ADL assistance needed  End of Session Communication: Bedside nurse  End of Session Patient Position: Bed, 2 rail up, Alarm on (call light in reach)    Plan:  Treatment Interventions: ADL retraining, Functional transfer training, UE strengthening/ROM, Neuromuscular reeducation  OT Frequency: 3 times per week  OT Discharge Recommendations: Moderate intensity level of continued care, 24 hr supervision due to cognition  OT - OK to Discharge: Yes (from an O.T. standpoint)    Subjective     Current Problem:  Patient Active Problem List   Diagnosis    Hypertensive heart disease with heart failure    Atrial fibrillation (Multi)    Weakness    BPH (benign prostatic hyperplasia)    Gout    Hyperlipidemia    Hyponatremia    STUART (acute kidney injury) (CMS-[facility])       General:  General  Reason for Referral: OT eval & treat for ADLs/safety (STUART, suspect acute metabolic encephalopathy secondary to UTI in setting of dementia)  Referred By: Germán Mills DO  Past Medical History Relevant to Rehab: 3/15/25:shaking and altered mental status. Recent fall.  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    Precautions:  Precautions Comment:  zamorano catheter, fall/safety,         Pain:  Pain Assessment  Pain Assessment: 0-10  0-10 (Numeric) Pain Score: 0 - No pain    Objective     Cognition:  Overall Cognitive Status: Impaired, Impaired at baseline (A & O x 1, confused, impaired memory, requires mod cues for safety.)        Home Living:  Home Living Comments: Per EMR, patient admitted from [facility] unit. Independent ADLs, transfers/mobility; 4 recent falls. Staff assist IADLs     ADL:  Grooming Assistance: Minimal  UE Dressing Assistance: Maximal  LE Dressing Assistance: Total  Toileting Assistance with Device: Total    Activity Tolerance:  Endurance: Decreased tolerance for upright activites    Bed Mobility/Transfers:   Bed Mobility  Bed Mobility:  (min assist supine <-> sit with rail)  Transfers  Transfer:  (min assist sit to stand from edge of bed)    Ambulation/Gait Training:  Functional Mobility  Functional Mobility Performed:  (min assist to take 2-3 lateral steps at edge of bed with UE support)    Sitting Balance:  Dynamic Sitting Balance  Dynamic Sitting-Level of Assistance: Minimum assistance    Standing Balance:  Dynamic Standing Balance  Dynamic Standing-Balance Support: Bilateral upper extremity supported  Dynamic Standing-Level of Assistance: Minimum assistance      Strength:  Strength Comments: BUE 2-/5 proximally, limited due to arthritis; 4-/5 distally    Coordination:  Movements are Fluid and Coordinated: Yes       Outcome Measures: [facility] Daily Activity  Putting on and taking off regular lower body clothing: Total  Bathing (including washing, rinsing, drying): A lot  Putting on and taking off regular upper body clothing: A lot  Toileting, which includes using toilet, bedpan or urinal: Total  Taking care of personal grooming such as brushing teeth: A little  Eating Meals: A little  Daily Activity - Total Score: 12                    EDUCATION:     Education Documentation  ADL Training, taught by Edelmira Jin OT at 3/18/2025   2:37 PM.  Learner: Patient  Readiness: Acceptance  Method: Explanation  Response: Needs Reinforcement    Education Comments  No comments found.        Goals:   Encounter Problems       Encounter Problems (Active)       OT Goals       Increase bed mobility & functional transfers to/from bed, chair & commode to SBA with DME for safety  (Progressing)       Start:  03/18/25    Expected End:  04/01/25            Increase dynamic sit balance to SBA and dynamic stand balance to CGA with UE support to promote increased independence with ADL & functional transfers  (Progressing)       Start:  03/18/25    Expected End:  04/01/25            Increase grooming and UE dressing to SBA and LE dressing to min assist with adaptive equipment prn (Progressing)       Start:  03/18/25    Expected End:  04/01/25

## 2025-03-18 NOTE — PROGRESS NOTES
Cheko Jin is a 79 y.o. male on day 3 of admission presenting with STUART (acute kidney injury) (CMS-[facility]).      Subjective   Pt seen and examined.        Objective     Last Recorded Vitals  /69 (BP Location: Right arm, Patient Position: Lying)   Pulse 68   Temp 36.3 °C (97.3 °F) (Temporal)   Resp 20   Wt 76.2 kg (167 lb 15.9 oz)   SpO2 95%   Intake/Output last 3 Shifts:    Intake/Output Summary (Last 24 hours) at 3/18/2025 1236  Last data filed at 3/18/2025 0613  Gross per 24 hour   Intake 200 ml   Output 2120 ml   Net -1920 ml       Admission Weight  Weight: 73 kg (160 lb 15 oz) (03/15/25 0715)    Daily Weight  03/18/25 : 76.2 kg (167 lb 15.9 oz)      Physical Exam  GEN:  awake, alert, not in acute distress  HEENT:  left forehead scalp laceration with sutures  Cardio:  RRR,  Resp:  CTAB, no wheezing  Abd:  +BS, soft, nontender  :  Iqbal in place  Neuro:  A&Ox1, CN2-12 grossly intact, no focal deficits  Msk:  no gross deformities, no joint effusions  Skin:  warm, dry, intact  Psych:  pleasantly confused, repeats questions   Relevant Results  Results for orders placed or performed during the hospital encounter of 03/15/25 (from the past 24 hours)   Basic metabolic panel   Result Value Ref Range    Glucose 140 (H) 74 - 99 mg/dL    Sodium 141 136 - 145 mmol/L    Potassium 3.8 3.5 - 5.3 mmol/L    Chloride 108 (H) 98 - 107 mmol/L    Bicarbonate 23 21 - 32 mmol/L    Anion Gap 14 10 - 20 mmol/L    Urea Nitrogen 48 (H) 6 - 23 mg/dL    Creatinine 3.11 (H) 0.50 - 1.30 mg/dL    eGFR 20 (L) >60 mL/min/1.73m*2    Calcium 8.3 (L) 8.6 - 10.3 mg/dL   CBC   Result Value Ref Range    WBC 8.5 4.4 - 11.3 x10*3/uL    nRBC 0.0 0.0 - 0.0 /100 WBCs    RBC 3.60 (L) 4.50 - 5.90 x10*6/uL    Hemoglobin 11.0 (L) 13.5 - 17.5 g/dL    Hematocrit 34.8 (L) 41.0 - 52.0 %    MCV 97 80 - 100 fL    MCH 30.6 26.0 - 34.0 pg    MCHC 31.6 (L) 32.0 - 36.0 g/dL    RDW 14.2 11.5 - 14.5 %    Platelets 115 (L) 150 - 450 x10*3/uL   Magnesium    Result Value Ref Range    Magnesium 2.31 1.60 - 2.40 mg/dL        CT chest abdomen pelvis wo IV contrast   Final Result   Chest   1.  No definite acute thoracic findings within limits of this exam.        Abdomen-Pelvis   1.  Considerable circumferential bladder wall thickening with   infiltration of the adjacent fat this is less pronounced than the   prior exam. There is associated bilateral hydro ureteral nephrosis.   However this is very mild and is notably improved from the prior exam.             MACRO:   None        Signed by: Joseph Schoenberger 3/15/2025 9:46 AM   Dictation workstation:   [id number]      XR chest 1 view   Final Result   1.  No evidence of acute cardiopulmonary process.                  MACRO:   None        Signed by: Joseph Schoenberger 3/15/2025 7:46 AM   Dictation workstation:   [id number]      CT brain attack head wo IV contrast   Final Result   No evidence of acute cortical infarct or intracranial hemorrhage.        No evidence of intracranial hemorrhage or displaced skull fracture.        MACRO:   Joseph Schoenberger discussed the significance and urgency of this   critical finding by telephone with  YOVANI VERMA on 3/15/2025 at   7:23 am.  (**-RCF-**) Findings:  BAT.             Signed by: Joseph Schoenberger 3/15/2025 7:23 AM   Dictation workstation:   [id number]      US renal complete    (Results Pending)       Scheduled medications  acetaminophen, 650 mg, oral, q6h  amiodarone, 200 mg, oral, Daily  apixaban, 5 mg, oral, BID  aspirin, 81 mg, oral, Daily  atorvastatin, 40 mg, oral, Daily  divalproex, 250 mg, oral, q12h ANTONY  finasteride, 5 mg, oral, Daily  haloperidol lactate, 2 mg, intramuscular, Once  [Held by provider] metoprolol succinate XL, 25 mg, oral, Daily  midodrine, 5 mg, oral, TID  piperacillin-tazobactam, 2.25 g, intravenous, q8h  rivastigmine, 1 patch, transdermal, Daily  [Held by provider] tamsulosin, 0.4 mg, oral, Nightly      Continuous medications     PRN  medications           Assessment/Plan          This patient has a urinary catheter   Reason for the urinary catheter remaining today? 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 Coli bacteremia from  source, weaned off pressors and blood pressure is stable.  Blood and urine culture reviewed, positive for e. Coli.  Awaiting sensitivities.  Continue treatment with renally dosed Zosyn.  ID consulted  Creatinine downtrending, no need for IVFs at this time.  Leave Iqbal in place to drain, Iqbal was exchanged in the ED.  Urology note reviewed, ok to resume Eliquis.  However, it appears patient has had many falls recently.  Consideration for stopping Eliquis due to high risk of falls needs to be addressed with hcpoa prior to discharge.  He has a history of multiple strokes.  Hgb stable.  Thrombocytopenia in setting of sepsis, continue to monitor.  Continue amiodarone.  He has advanced dementia, continue Depakote and Rivastigmine.              Kristina Villalta MD

## 2025-03-18 NOTE — CARE PLAN
Problem: Discharge Planning  Goal: Discharge to home or other facility with appropriate resources  Outcome: Progressing     Problem: Chronic Conditions and Co-morbidities  Goal: Patient's chronic conditions and co-morbidity symptoms are monitored and maintained or improved  Outcome: Progressing     Problem: Nutrition  Goal: Nutrient intake appropriate for maintaining nutritional needs  Outcome: Progressing     Problem: Skin  Goal: Decreased wound size/increased tissue granulation at next dressing change  Outcome: Progressing  Flowsheets (Taken 3/18/2025 1526)  Decreased wound size/increased tissue granulation at next dressing change: Promote sleep for wound healing  Goal: Participates in plan/prevention/treatment measures  Outcome: Progressing  Flowsheets (Taken 3/18/2025 1526)  Participates in plan/prevention/treatment measures: Elevate heels  Goal: Prevent/manage excess moisture  Outcome: Progressing  Flowsheets (Taken 3/18/2025 1526)  Prevent/manage excess moisture: Moisturize dry skin  Goal: Prevent/minimize sheer/friction injuries  Outcome: Progressing  Flowsheets (Taken 3/18/2025 1526)  Prevent/minimize sheer/friction injuries: Use pull sheet  Goal: Promote/optimize nutrition  Outcome: Progressing  Flowsheets (Taken 3/18/2025 1526)  Promote/optimize nutrition: Monitor/record intake including meals  Goal: Promote skin healing  Outcome: Progressing  Flowsheets (Taken 3/18/2025 1526)  Promote skin healing: Turn/reposition every 2 hours/use positioning/transfer devices     Problem: Fall/Injury  Goal: Not fall by end of shift  Outcome: Progressing  Goal: Be free from injury by end of the shift  Outcome: Progressing  Goal: Verbalize understanding of personal risk factors for fall in the hospital  Outcome: Progressing  Goal: Verbalize understanding of risk factor reduction measures to prevent injury from fall in the home  Outcome: Progressing  Goal: Use assistive devices by end of the shift  Outcome:  Progressing  Goal: Pace activities to prevent fatigue by end of the shift  Outcome: Progressing   The patient's goals for the shift include      The clinical goals for the shift include comfort and safety

## 2025-03-18 NOTE — PROGRESS NOTES
"Cheko Jin is a 79 y.o. male on day 3 of admission presenting with STUART (acute kidney injury) (CMS-[facility]).    SUBJECTIVE  Doing well seen and examined at bedside.  No fevers or chills, no diarrhea tolerating antibiotics.  ANO x 2 not oriented to time.  Has not seen a kidney doctor are told he has had chronic kidney disease.    OBJECTIVE    Physical Exam:  General:  Pleasant and cooperative. No apparent distress.  ANO x 2  HEENT:  Normocephalic, atraumatic  Chest: Bilateral and appropriate chest rise  Extremities:  No lower extremity edema or cyanosis.   Neurological: Conversational  Skin:  Warm and dry.    Last Recorded Vitals  Blood pressure 128/90, pulse 76, temperature 37 °C (98.6 °F), temperature source Temporal, resp. rate 20, height 1.778 m (5' 10\"), weight 76.2 kg (167 lb 15.9 oz), SpO2 98%.  Intake/Output last 3 Shifts:  I/O last 3 completed shifts:  In: 780 (10.2 mL/kg) [P.O.:480; IV Piggyback:300]  Out: 3640 (47.8 mL/kg) [Urine:3640 (1.3 mL/kg/hr)]  Weight: 76.2 kg     Last CBC & BMP  Lab Results   Component Value Date    GLUCOSE 140 (H) 03/18/2025    CALCIUM 8.3 (L) 03/18/2025     03/18/2025    K 3.8 03/18/2025    CO2 23 03/18/2025     (H) 03/18/2025    BUN 48 (H) 03/18/2025    CREATININE 3.11 (H) 03/18/2025     Lab Results   Component Value Date    WBC 8.5 03/18/2025    HGB 11.0 (L) 03/18/2025    HCT 34.8 (L) 03/18/2025    MCV 97 03/18/2025     (L) 03/18/2025       ASSESSMENT & PLAN  79-year-old male comes to [facility] from [facility] memory care unit on 3/15 for fall.  Admitted for septic shock 2/2 E. coli bacteremia   Initially admitted to ICU for hypotension requiring pressors.  Transferred back to medicine floors 3/17.    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 coli bacteremia  #Mild bilateral ureteral hydronephrosis  #Metabolic acidosis, resolved  #Hx bladder cancer  -CT abdomen revealed bilateral hydroureteronephrosis which is likely not playing a role " in STUART  -Peak creatinine 3.6 now downtrending and good urine output  -Iqbal in place collect I/os  -Setting of sepsis receiving vancomycin/Zosyn renally dosed  -Will obtain renal ultrasound to assess for resolution of bilateral hydronephrosis  -Urology following    Other issues  HFrEF 20%  A-fib on Eliquis  HTN/HLD  Advanced dementia ANO x 1 at baseline    Mirna Collins, DO  PGY-2, Internal Medicine  Please SecureChat for any further questions  This is a preliminary note, please await attending attestation for final A/P

## 2025-03-19 VITALS
SYSTOLIC BLOOD PRESSURE: 121 MMHG | WEIGHT: 168.43 LBS | RESPIRATION RATE: 18 BRPM | TEMPERATURE: 97.9 F | HEIGHT: 70 IN | DIASTOLIC BLOOD PRESSURE: 68 MMHG | BODY MASS INDEX: 24.11 KG/M2 | HEART RATE: 62 BPM | OXYGEN SATURATION: 96 %

## 2025-03-19 LAB
ANION GAP SERPL CALC-SCNC: 11 MMOL/L (ref 10–20)
ATRIAL RATE: 119 BPM
BUN SERPL-MCNC: 35 MG/DL (ref 6–23)
CALCIUM SERPL-MCNC: 8.4 MG/DL (ref 8.6–10.3)
CHLORIDE SERPL-SCNC: 109 MMOL/L (ref 98–107)
CO2 SERPL-SCNC: 25 MMOL/L (ref 21–32)
CREAT SERPL-MCNC: 2.65 MG/DL (ref 0.5–1.3)
EGFRCR SERPLBLD CKD-EPI 2021: 24 ML/MIN/1.73M*2
ERYTHROCYTE [DISTWIDTH] IN BLOOD BY AUTOMATED COUNT: 14 % (ref 11.5–14.5)
GLUCOSE SERPL-MCNC: 122 MG/DL (ref 74–99)
HCT VFR BLD AUTO: 33.4 % (ref 41–52)
HGB BLD-MCNC: 10.6 G/DL (ref 13.5–17.5)
MCH RBC QN AUTO: 30.1 PG (ref 26–34)
MCHC RBC AUTO-ENTMCNC: 31.7 G/DL (ref 32–36)
MCV RBC AUTO: 95 FL (ref 80–100)
NRBC BLD-RTO: 0 /100 WBCS (ref 0–0)
P OFFSET: 186 MS
P ONSET: 134 MS
PLATELET # BLD AUTO: 116 X10*3/UL (ref 150–450)
POTASSIUM SERPL-SCNC: 3.9 MMOL/L (ref 3.5–5.3)
PR INTERVAL: 160 MS
Q ONSET: 214 MS
QRS COUNT: 19 BEATS
QRS DURATION: 88 MS
QT INTERVAL: 274 MS
QTC CALCULATION(BAZETT): 385 MS
QTC FREDERICIA: 344 MS
R AXIS: 143 DEGREES
RBC # BLD AUTO: 3.52 X10*6/UL (ref 4.5–5.9)
SODIUM SERPL-SCNC: 141 MMOL/L (ref 136–145)
T AXIS: 156 DEGREES
T OFFSET: 351 MS
VENTRICULAR RATE: 119 BPM
WBC # BLD AUTO: 5.7 X10*3/UL (ref 4.4–11.3)

## 2025-03-19 PROCEDURE — 2500000001 HC RX 250 WO HCPCS SELF ADMINISTERED DRUGS (ALT 637 FOR MEDICARE OP)

## 2025-03-19 PROCEDURE — 80048 BASIC METABOLIC PNL TOTAL CA: CPT | Performed by: INTERNAL MEDICINE

## 2025-03-19 PROCEDURE — 2500000002 HC RX 250 W HCPCS SELF ADMINISTERED DRUGS (ALT 637 FOR MEDICARE OP, ALT 636 FOR OP/ED)

## 2025-03-19 PROCEDURE — 36415 COLL VENOUS BLD VENIPUNCTURE: CPT | Performed by: INTERNAL MEDICINE

## 2025-03-19 PROCEDURE — 2500000001 HC RX 250 WO HCPCS SELF ADMINISTERED DRUGS (ALT 637 FOR MEDICARE OP): Performed by: INTERNAL MEDICINE

## 2025-03-19 PROCEDURE — 2500000004 HC RX 250 GENERAL PHARMACY W/ HCPCS (ALT 636 FOR OP/ED)

## 2025-03-19 PROCEDURE — 99239 HOSP IP/OBS DSCHRG MGMT >30: CPT | Performed by: INTERNAL MEDICINE

## 2025-03-19 PROCEDURE — 85027 COMPLETE CBC AUTOMATED: CPT | Performed by: INTERNAL MEDICINE

## 2025-03-19 RX ORDER — AMOXICILLIN 500 MG/1
1000 CAPSULE ORAL EVERY 12 HOURS SCHEDULED
Qty: 44 CAPSULE | Refills: 0 | Status: SHIPPED | OUTPATIENT
Start: 2025-03-19 | End: 2025-03-30

## 2025-03-19 RX ORDER — AMOXICILLIN 875 MG/1
875 TABLET, FILM COATED ORAL 2 TIMES DAILY
Qty: 22 TABLET | Refills: 0 | Status: SHIPPED | OUTPATIENT
Start: 2025-03-19 | End: 2025-03-19

## 2025-03-19 RX ORDER — AMOXICILLIN 875 MG/1
875 TABLET, FILM COATED ORAL 2 TIMES DAILY
Qty: 22 TABLET | Refills: 0 | Status: SHIPPED | OUTPATIENT
Start: 2025-03-19 | End: 2025-03-19 | Stop reason: HOSPADM

## 2025-03-19 RX ADMIN — APIXABAN 5 MG: 5 TABLET, FILM COATED ORAL at 09:20

## 2025-03-19 RX ADMIN — MIDODRINE HYDROCHLORIDE 5 MG: 2.5 TABLET ORAL at 09:27

## 2025-03-19 RX ADMIN — FINASTERIDE 5 MG: 5 TABLET, FILM COATED ORAL at 09:20

## 2025-03-19 RX ADMIN — ATORVASTATIN CALCIUM 40 MG: 40 TABLET, FILM COATED ORAL at 09:20

## 2025-03-19 RX ADMIN — ASPIRIN 81 MG: 81 TABLET, COATED ORAL at 09:20

## 2025-03-19 RX ADMIN — MIDODRINE HYDROCHLORIDE 5 MG: 2.5 TABLET ORAL at 11:46

## 2025-03-19 RX ADMIN — RIVASTIGMINE 1 PATCH: 4.6 PATCH, EXTENDED RELEASE TRANSDERMAL at 11:51

## 2025-03-19 RX ADMIN — DIVALPROEX SODIUM 250 MG: 250 TABLET, DELAYED RELEASE ORAL at 09:20

## 2025-03-19 RX ADMIN — ACETAMINOPHEN 650 MG: 325 TABLET, FILM COATED ORAL at 05:24

## 2025-03-19 RX ADMIN — AMIODARONE HYDROCHLORIDE 200 MG: 200 TABLET ORAL at 09:22

## 2025-03-19 ASSESSMENT — PAIN - FUNCTIONAL ASSESSMENT: PAIN_FUNCTIONAL_ASSESSMENT: 0-10

## 2025-03-19 ASSESSMENT — PAIN SCALES - GENERAL: PAINLEVEL_OUTOF10: 0 - NO PAIN

## 2025-03-19 NOTE — NURSING NOTE
Discharge planning reviewed with patient P.O.A. Guardian at bedside.  Medications, new and existing reviewed with patient and guardian, including medication to be received at facility patient stays with, under [facility]. Follow up appointments reviewed and understood by P.O.A.  IV and Tele  removed.  Home Health to follow at [facility].  Iqbal catheter to remain in place until follow up with Urology.  AVS printed and highlighted for P.O.A.; [place] printed and sent with P.O.A. to facility.  Nurse to call report to facility prior to discharge.  .  Patient transported back to [facility] by P.O.A.

## 2025-03-19 NOTE — PROGRESS NOTES
03/19/25 1044   Discharge Planning   Living Arrangements Other (Comment)   Support Systems Friends/neighbors   Type of Residence Assisted living   Care Facility Name [facility]   Expected Discharge Disposition Home H   Does the patient need discharge transport arranged? No     I called, spoke with nurse Lillian at [facility] memory care unit, informed her that patient has discharge orders and will be returning today.  [facility]ore is 16/12.  [facility] is [facility] facility [place] contracts with; outgoing/external home care orders need.  Patient transitioning for IV to oral antibiotics; script shout be sent to [facility].  This TCC to reach out to patient's friend/POA Franki for transportation.  Connie LANG TCC  Patient's friend/POANUM Doan stated he will pick patient up at 1300 to take him back to [facility] unit.  Home care orders attached to referral in [facility].

## 2025-03-19 NOTE — PROGRESS NOTES
"Cheko Jin is a 79 y.o. male on day 4 of admission presenting with STUART (acute kidney injury) (CMS-[facility]).    SUBJECTIVE  About 2.3 L urine output over the past 24 hours, net -4.5 L.  Doing well this morning no acute issues, eating without any problems.  No fevers or chills no pain.    OBJECTIVE    Physical Exam:  General:  Pleasant and cooperative. No apparent distress.  ANO x 2  HEENT:  Normocephalic, atraumatic  Chest: Bilateral and appropriate chest rise  Extremities:  No lower extremity edema or cyanosis.   Neurological: Conversational  Skin:  Warm and dry.    Last Recorded Vitals  Blood pressure 156/76, pulse 61, temperature 36.3 °C (97.3 °F), temperature source Temporal, resp. rate 18, height 1.778 m (5' 10\"), weight 76.4 kg (168 lb 6.9 oz), SpO2 93%.  Intake/Output last 3 Shifts:  I/O last 3 completed shifts:  In: 150 (2 mL/kg) [IV Piggyback:150]  Out: 4100 (53.7 mL/kg) [Urine:4100 (1.5 mL/kg/hr)]  Weight: 76.4 kg     Last CBC & BMP  Lab Results   Component Value Date    GLUCOSE 122 (H) 03/19/2025    CALCIUM 8.4 (L) 03/19/2025     03/19/2025    K 3.9 03/19/2025    CO2 25 03/19/2025     (H) 03/19/2025    BUN 35 (H) 03/19/2025    CREATININE 2.65 (H) 03/19/2025     Lab Results   Component Value Date    WBC 5.7 03/19/2025    HGB 10.6 (L) 03/19/2025    HCT 33.4 (L) 03/19/2025    MCV 95 03/19/2025     (L) 03/19/2025       ASSESSMENT & PLAN  79-year-old male comes to [facility] from [facility] memory care unit on 3/15 for fall.  Admitted for septic shock 2/2 E. coli bacteremia   Initially admitted to ICU for hypotension requiring pressors.  Transferred back to medicine floors 3/17.    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 coli bacteremia  #Mild bilateral ureteral hydronephrosis  #Metabolic acidosis, resolved  #Hx bladder cancer  -CT abdomen revealed bilateral hydroureteronephrosis which is likely not playing a role in STUART  -Peak creatinine 3.6 now downtrending and " continues to have good urine output  -Iqbal in place collect I/os, can go w/ SNF  -Setting of sepsis receiving vancomycin/Zosyn renally dosed  -renal ultrasound reveals resolution of bilateral hydronephrosis  -Urology/ID following.  Receiving ABX. Should follow up with urology outpatient.     Other issues  HFrEF 20%  A-fib on Eliquis  HTN/HLD  Advanced dementia ANO x 1 at baseline    Mirna Collins, DO  PGY-2, Internal Medicine  Please SecureChat for any further questions  This is a preliminary note, please await attending attestation for final A/P

## 2025-03-19 NOTE — CARE PLAN
The patient's goals for the shift include  to be free of falls.     The clinical goals for the shift include safety    Over the shift, the patient did  make progress toward the following goals.

## 2025-03-19 NOTE — DISCHARGE SUMMARY
Discharge Diagnosis  STUART (acute kidney injury) (CMS-[facility])    Issues Requiring Follow-Up  PCP and urology follow up    Discharge Meds     Medication List      START taking these medications     amoxicillin 500 mg capsule; Commonly known as: Amoxil; Take 2 capsules   (1,000 mg) by mouth every 12 hours for 11 days.     CHANGE how you take these medications     atorvastatin 40 mg tablet; Commonly known as: Lipitor; Take 1 tablet (40   mg) by mouth once daily.; What changed: when to take this     CONTINUE taking these medications     acetaminophen 325 mg tablet; Commonly known as: Tylenol   amiodarone 200 mg tablet; Commonly known as: Pacerone   apixaban 5 mg tablet; Commonly known as: Eliquis   aspirin 81 mg EC tablet   divalproex 250 mg EC tablet; Commonly known as: Depakote   finasteride 5 mg tablet; Commonly known as: Proscar   magnesium hydroxide 400 mg/5 mL suspension; Commonly known as: Milk of   Magnesia   melatonin 3 mg tablet   metoprolol succinate XL 25 mg 24 hr tablet; Commonly known as: Toprol-XL   rivastigmine 4.6 mg/24 hour; Commonly known as: Exelon   tamsulosin 0.4 mg 24 hr capsule; Commonly known as: Flomax     STOP taking these medications     allopurinol 300 mg tablet; Commonly known as: Zyloprim   cholecalciferol 50 MCG (2000 UT) tablet; Commonly known as: Vitamin D-3   loperamide 2 mg tablet; Commonly known as: Imodium A-D   multivitamin with minerals tablet       Test Results Pending At Discharge  Pending Labs       Order Current Status    Blood Culture Preliminary result    Blood Culture Preliminary result            Hospital Course     # Septic shock  # Bacteremia  # STUART on CKD 3  # Iqbal catheter associated UTI present on admission  # Bilateral hydronephrosis   # Gross hematuria  # Type 2 MI  # Thrombocytopenia  # Recurrent falls  # Forehead laceration     Atrial fibrillation on Eliquis  GOMEZ on CPAP  HFmrEF 45%  CAD  Advance dementia  H/o right CEA  H/o strokes     Patient admitted for septic  shock 2/2 e. Coli bacteremia from  source, weaned off pressors and blood pressure is stable.  Blood and urine culture reviewed, positive for e. Coli.  Awaiting sensitivities.  Continue treatment with renally dosed Zosyn.  ID consulted: Switched Zosyn to Rocephin while inpatient. On discharge, may switch over to high dose amoxicillin q12h to complete a 14 day antibiotic course   Creatinine downtrending, no need for IVFs at this time.  Leave Iqbal in place to drain, Iqbal was exchanged in the ED.  Urology note reviewed, ok to resume Eliquis.  However, it appears patient has had many falls recently.  Consideration for stopping Eliquis due to high risk of falls needs to be addressed with hcpoa prior to discharge.  He has a history of multiple strokes. Tried to call [facility]CP[facility], but could not reach them. Advised the pt to discuss with PCP about stopping eliquis.  Hgb stable.  Thrombocytopenia in setting of sepsis, continue to monitor.  Continue amiodarone.  He has advanced dementia, continue Depakote and Rivastigmine.    Patient is doing better today.  He has been cleared for discharge from nephrology and ID standpoint he will be discharged on oral Augmentin for 11 more days to complete the course.  Patient will be discharged with Iqbal and he will follow-up with urology as outpatient for Iqbal removal.  Also advised him to follow-up with his PCP as outpatient and also discussed with PCP about stopping the Eliquis.      Discharge time spent more than 30 minutes    Pertinent Physical Exam At Time of Discharge  Physical Exam  GEN:  awake, alert, not in acute distress  HEENT:  left forehead scalp laceration with sutures  Cardio:  RRR,  Resp:  CTAB, no wheezing  Abd:  +BS, soft, nontender  :  Iqbal in place  Neuro:  A&Ox1, CN2-12 grossly intact, no focal deficits  Msk:  no gross deformities, no joint effusions  Skin:  warm, dry, intact  Psych:  pleasantly confused, repeats questions  Outpatient Follow-Up  No future  appointments.      Kristina Villalta MD

## 2025-03-20 LAB
BACTERIA BLD AEROBE CULT: ABNORMAL
BACTERIA BLD CULT: ABNORMAL
BLOOD EXPIRATION DATE: NORMAL
DISPENSE STATUS: NORMAL
GRAM STN SPEC: ABNORMAL
PRODUCT BLOOD TYPE: 5100
PRODUCT CODE: NORMAL
UNIT ABO: NORMAL
UNIT NUMBER: NORMAL
UNIT RH: NORMAL
UNIT VOLUME: 216

## 2025-03-21 LAB
BACTERIA BLD AEROBE CULT: ABNORMAL
BACTERIA BLD AEROBE CULT: ABNORMAL
BACTERIA BLD CULT: ABNORMAL
BACTERIA BLD CULT: ABNORMAL
GRAM STN SPEC: ABNORMAL
GRAM STN SPEC: ABNORMAL